# Patient Record
Sex: MALE | Race: WHITE | Employment: UNEMPLOYED | URBAN - METROPOLITAN AREA
[De-identification: names, ages, dates, MRNs, and addresses within clinical notes are randomized per-mention and may not be internally consistent; named-entity substitution may affect disease eponyms.]

---

## 2017-07-12 ENCOUNTER — HOSPITAL ENCOUNTER (OUTPATIENT)
Dept: GENERAL RADIOLOGY | Age: 32
Discharge: HOME | End: 2017-07-12
Payer: COMMERCIAL

## 2017-07-12 ENCOUNTER — HOSPITAL ENCOUNTER (OUTPATIENT)
Dept: PULMONOLOGY | Age: 32
Discharge: HOME | End: 2017-07-12
Payer: COMMERCIAL

## 2017-07-12 DIAGNOSIS — R09.02 HYPOXEMIA: ICD-10-CM

## 2017-07-12 PROCEDURE — 71020 XR CHEST STANDARD TWO VW: CPT

## 2017-07-12 PROCEDURE — 94726 PLETHYSMOGRAPHY LUNG VOLUMES: CPT

## 2017-07-12 PROCEDURE — 94729 DIFFUSING CAPACITY: CPT

## 2017-07-12 PROCEDURE — 94010 BREATHING CAPACITY TEST: CPT

## 2018-10-08 PROBLEM — J44.9 COPD (CHRONIC OBSTRUCTIVE PULMONARY DISEASE) (HCC): Status: ACTIVE | Noted: 2017-07-06

## 2018-10-08 RX ORDER — DEXTROAMPHETAMINE SACCHARATE, AMPHETAMINE ASPARTATE, DEXTROAMPHETAMINE SULFATE AND AMPHETAMINE SULFATE 5; 5; 5; 5 MG/1; MG/1; MG/1; MG/1
40 TABLET ORAL
COMMUNITY
End: 2018-10-17 | Stop reason: ALTCHOICE

## 2018-10-09 ENCOUNTER — TELEPHONE (OUTPATIENT)
Dept: INFECTIOUS DISEASES | Age: 33
End: 2018-10-09

## 2018-10-09 NOTE — TELEPHONE ENCOUNTER
Pt called into clinic stating he was diagnosised with hiv in sept. And looking for treatment. Testing completed by PCP Dr Miguelina Sesay. Will request records when release signed. His mother is his emergency contact as of right now. He \"knew something was wrong\" . His partner is hiv + . They have been together for 15 years now. Usually they use protection but a few times they did not. States he was seen by pcp and treated for thrush. Currently just changed jobs and this job does not offer insurance. Requested he apply to [facility] asap print nancy and bring it with him to intake. Requested pt bring in 1 month of pay stubs to determine RW eligibility. RW eligibility discussed. Complaints right now are fatigue and weight loss. Per t he has lost 20 lbs. Due to pt work schedule 1st available is 10/17/18 at 12pm for labs and intake. Pt was at work therefore he did not want to talk to much more about anything. Will discuss further at intake. Requested he call before apt if needing to talk or have questions or concerns at all.

## 2018-10-16 ENCOUNTER — TELEPHONE (OUTPATIENT)
Dept: INFECTIOUS DISEASES | Age: 33
End: 2018-10-16

## 2018-10-16 DIAGNOSIS — Z21 POSITIVE LABORATORY TESTING FOR HUMAN IMMUNODEFICIENCY VIRUS (HCC): Primary | ICD-10-CM

## 2018-10-16 NOTE — TELEPHONE ENCOUNTER
Brief VM left for pt regarding upcoming apt. Reminder of time and address. Also requested ins. ID and income.

## 2018-10-17 ENCOUNTER — NURSE ONLY (OUTPATIENT)
Dept: INFECTIOUS DISEASES | Age: 33
End: 2018-10-17

## 2018-10-17 DIAGNOSIS — Z21 POSITIVE LABORATORY TESTING FOR HUMAN IMMUNODEFICIENCY VIRUS (HCC): ICD-10-CM

## 2018-10-17 DIAGNOSIS — R91.1 NODULE OF UPPER LOBE OF RIGHT LUNG: ICD-10-CM

## 2018-10-17 DIAGNOSIS — J45.909 UNCOMPLICATED ASTHMA, UNSPECIFIED ASTHMA SEVERITY, UNSPECIFIED WHETHER PERSISTENT: ICD-10-CM

## 2018-10-17 DIAGNOSIS — F41.9 ANXIETY: ICD-10-CM

## 2018-10-17 DIAGNOSIS — D64.9 ANEMIA, UNSPECIFIED TYPE: ICD-10-CM

## 2018-10-17 DIAGNOSIS — E55.9 VITAMIN D DEFICIENCY: ICD-10-CM

## 2018-10-17 LAB
ALBUMIN SERPL-MCNC: 4.3 G/DL (ref 3.9–4.9)
ALP BLD-CCNC: 50 U/L (ref 35–104)
ALT SERPL-CCNC: 47 U/L (ref 0–41)
ANION GAP SERPL CALCULATED.3IONS-SCNC: 17 MEQ/L (ref 7–13)
AST SERPL-CCNC: 34 U/L (ref 0–40)
BILIRUB SERPL-MCNC: 0.5 MG/DL (ref 0–1.2)
BUN BLDV-MCNC: 19 MG/DL (ref 6–20)
CALCIUM SERPL-MCNC: 9.4 MG/DL (ref 8.6–10.2)
CHLORIDE BLD-SCNC: 100 MEQ/L (ref 98–107)
CO2: 23 MEQ/L (ref 22–29)
CREAT SERPL-MCNC: 0.82 MG/DL (ref 0.7–1.2)
GFR AFRICAN AMERICAN: >60
GFR NON-AFRICAN AMERICAN: >60
GLOBULIN: 2.9 G/DL (ref 2.3–3.5)
GLUCOSE BLD-MCNC: 91 MG/DL (ref 74–109)
HBV SURFACE AB TITR SER: REACTIVE MIU/ML
HCT VFR BLD CALC: 37.7 % (ref 42–52)
HEMOGLOBIN: 13 G/DL (ref 14–18)
HEPATITIS B CORE IGM ANTIBODY: NORMAL
HEPATITIS B SURFACE ANTIGEN INTERPRETATION: NORMAL
HEPATITIS C ANTIBODY INTERPRETATION: NORMAL
MCH RBC QN AUTO: 31.1 PG (ref 27–31.3)
MCHC RBC AUTO-ENTMCNC: 34.4 % (ref 33–37)
MCV RBC AUTO: 90.6 FL (ref 80–100)
PDW BLD-RTO: 13.7 % (ref 11.5–14.5)
PLATELET # BLD: 113 K/UL (ref 130–400)
PLATELET SLIDE REVIEW: ABNORMAL
POTASSIUM SERPL-SCNC: 4.4 MEQ/L (ref 3.5–5.1)
RBC # BLD: 4.16 M/UL (ref 4.7–6.1)
SODIUM BLD-SCNC: 140 MEQ/L (ref 132–144)
TOTAL PROTEIN: 7.2 G/DL (ref 6.4–8.1)
WBC # BLD: 2.6 K/UL (ref 4.8–10.8)

## 2018-10-17 NOTE — PROGRESS NOTES
RN Intake      Patient ID:   Tre Keith  Date:   10/17/2018      Client ID:    [id number] [id number] [phone] (home)   [x] OK to leave voicemail   pt cell number use as  primary- [phone]  Family/House:    [x] Partner  [] Children  [] Other -   Partner of 15 years. Partner is hiv positive  Housing:   home  Lives with grandma. Stays in [place] with partner mostly. Transport:  car    Employment:  Works at [employer] [place] [address] to anxiety and depression but denies any need for referral at this time. Substance Abuse:     Social History     Social History    Marital status: Single     Spouse name: N/A    Number of children: N/A    Years of education: N/A     Occupational History    Not on file. Social History Main Topics    Smoking status: Current Every Day Smoker     Packs/day: 0.50     Types: Cigarettes    Smokeless tobacco: Not on file      Comment: cut down to 4 cig. /day    Alcohol use No    Drug use: No    Sexual activity: Not on file     Other Topics Concern    Not on file     Social History Narrative    No narrative on file   NO etoh  NO drugs  Yes smoking 1/2 ppd- not ready to quit  Male partners. Yes sexually active- on off protection- protection reinforced     Diagnosis:   Date of diagnosis:  Sept 2018   Transmition:  heterosexual contact   Signs and symptoms:   [] Diarrhea  [x] Night sweats   [x] Dizziness  [x] Chronic fatigue   [] Vomiting  [x] Nausea   [] Hand/feet pain [] Headaches   [] Swollen lymph [] Skin rash   [] Fevers  [] Body aches   [x] Weight loss  [] Other -   Lost 20 lbs over 8 months not trying  Opportunistic Infections:     [x] Thrush - recently treated for this end of sept. [] Wasting   [] KS   [] PCP   [] CMV   [] STD - labs are to be done to [facility]Healthcare:     PCP Visit:  Maribel eaton.  See scanned in office note and most recent labs   ID Visit:  Will be Dr Audra Morse Visit:  July 2018   Last lab work:  Scanned in from [facility] of [address] [place] [address] Road:   No results found for: LABABSO    Viral Load:  No results found for: ZBH9MOMLAJC, ZKM3DTPHIG   Scanned. Labs per :   Confirmation western blot 8/28/18  [address]  [address] [id number]      Medical Insurance:  Payor: JUSTO Rubio [id number] / Plan: [id number] / Product Type: *No Product type* /    OHDAP/HIPSCA:     Renewal Date:    Pending medicaid- will use RW as last resort. Pt will get new dx labs today or tomorrow. Allergies:  is allergic to toradol [ketorolac tromethamine]; codeine; hydrocodone-acetaminophen; morphine and related; pcn [penicillins]; tramadol; and ketorolac. Reviewed and confirmed with pt  Current Medications:  Current Outpatient Prescriptions on File Prior to Visit   Medication Sig Dispense Refill    albuterol (PROVENTIL) (2.5 MG/3ML) 0.083% nebulizer solution use 1 (ONE) vial (3 (THREE) mls) via NEBULIZER EVERY FOUR HOURS AS NEEDED for FOR WHEEZING  0    VENTOLIN  (90 BASE) MCG/ACT inhaler inhale 1-2 puffs BY MOUTH EVERY FOUR HOURS AS NEEDED for FOR WHEEZING  0    SUMAtriptan (IMITREX) 100 MG tablet       lisdexamfetamine (VYVANSE) 70 MG capsule Take 70 mg by mouth daily       No current facility-administered medications on file prior to visit. reviewed and updated    History of HIV Medications:  NA    Pharmacy:    No Pharmacies Listed  Patient prefers [address]. 2nd choice: [facility]. Legal Issues:    denies    Emergency Contact:   Extended Emergency Contact Information  Primary Emergency Contact: Juan Carlos Hensley  Home Phone: [phone]  Relation: Other  Secondary Emergency Contact: Dc Spence 8 Phone: [phone]  Relation: Other  Also added mother Ahsan Wheeler [phone]  Support System:  Partner and family. Still adjusting to new dx. RN Comments:  Partner of 15 years. Mishap with relationship pt cheated on partner for 2 years un until he was \"sick\" this person is on truvada per pt.

## 2018-10-17 NOTE — PROGRESS NOTES
Client Psychosocial Assessment      Patient ID:   Jose Burciaga  Date:   10/17/18    Client ID:  [id number]  SSN:   [unfilled]  :  1985  :  Andra Huston    [address] [place] [address]  [phone] (home)   [x] OK to leave voicemail    Gender:  male Race:  [address] Str of Residency:  1D  Consortia of Agency:  1D    Referral Source:  Physician/Medical Provider    Family and Professional Contacts:    Extended Emergency Contact Information  Primary Emergency Contact: Juan Carlos Hensley  Home Phone: [phone]  Relation: Other  Secondary Emergency Contact: Malindaalidagustavo Spence  Phone: [phone]  Relation: Other     Samantha Manning MD  [address]     Name: Aware of DX: Supportive: Contact: Address/Phone:   Spouse/Partner:     Luis Edmonds Yes Yes Yes  [phone]   Parents:     Bennie Reddy      Yes Yes Yes [phone]   Siblings:      No No No    Children:      No No No    Others:      No No No    Professional Contacts:     Wendy Mena MD Yes Yes Yes             Medical Insurance:    Payor: Gideon Diaz / Plan: Bernita Dakin / Product Type: *No Product type* /     [facility] outpatient clinic/department    Applied for HIV Rx Program:   OHDAP:  No   Patient Assistance:  No    Diagnosis:    Self-Reported Diagnosis:     HIV:  Yes - Date:    AIDS:  Yes - Date:     Current Physician:     Samantha Manning MD     [address] [facility]     Phone:  [phone]     Dr. Jason Menendez on file: Yes Hospital Affiliation:     [address] [place] [address]     Phone: [phone] [facility] Worker:          Phone:      HIV + Asymptomatic:  No  HIV + Symptomatic:  Yes - Date:     CD4:   No results found for: LABABSO    Viral Load:  No results found for: OSP4COAKGHP, YES4ZZHBHF    HIV Exposure Checklist:    [x] Men who have sex with men (MSM)  [] Injection drug use (IDU)  [] Women who have sex with women

## 2018-10-18 LAB — RPR: NORMAL

## 2018-10-19 LAB
ABSOLUTE CD 4 HELPER: 46 CELLS/UL (ref 430–1800)
CD4 % HELPER T CELL: 8 % (ref 32–64)
HAV AB SERPL IA-ACNC: NEGATIVE
LYMPHOCYTE SUBSET PANEL 2 INFO: ABNORMAL
TOXOPLASMA GONDII AB IGG: <3 IU/ML

## 2018-10-20 LAB
HIV RNA PCR INTERPRETATION: DETECTED
HIV-1 AND HIV-2 ANTIBODIES: NORMAL
HIV-1 RNA BY PCR, QN: 5.2 LOG
HIV-1 RNA BY PCR, QN: ABNORMAL CPY/ML

## 2018-10-21 LAB — HIV-1 WESTERN BLOT: POSITIVE

## 2018-10-22 LAB
QUANTI TB GOLD PLUS: NEGATIVE
QUANTI TB1 MINUS NIL: 0 IU/ML (ref 0–0.34)
QUANTI TB2 MINUS NIL: 0 IU/ML (ref 0–0.34)
QUANTIFERON MITOGEN: 9.95 IU/ML
QUANTIFERON NIL: 0.03 IU/ML

## 2018-10-23 LAB
C. TRACHOMATIS DNA ,URINE: NEGATIVE
HLA-B*5701 GENOTYPING: NEGATIVE
HLA-B*5701 SPECIMEN: NORMAL
N. GONORRHOEAE DNA, URINE: NEGATIVE

## 2018-10-24 LAB
G6PD ALLELE 1: NEGATIVE
G6PD ALLELE 2: NORMAL
G6PD MUTATION INTERPRETATION: NORMAL
G6PD SPECIMEN: NORMAL

## 2018-10-25 LAB
EER HIV GENOTYPING: NORMAL
HIV-1 GENOTYPE: NORMAL

## 2018-10-26 ENCOUNTER — HOSPITAL ENCOUNTER (INPATIENT)
Age: 33
LOS: 4 days | Discharge: HOME | DRG: 974 | End: 2018-10-30
Attending: INTERNAL MEDICINE | Admitting: INTERNAL MEDICINE
Payer: COMMERCIAL

## 2018-10-26 ENCOUNTER — OFFICE VISIT (OUTPATIENT)
Dept: INFECTIOUS DISEASES | Age: 33
End: 2018-10-26
Payer: COMMERCIAL

## 2018-10-26 ENCOUNTER — APPOINTMENT (OUTPATIENT)
Dept: GENERAL RADIOLOGY | Age: 33
DRG: 974 | End: 2018-10-26
Attending: INTERNAL MEDICINE
Payer: COMMERCIAL

## 2018-10-26 ENCOUNTER — NURSE ONLY (OUTPATIENT)
Dept: INFECTIOUS DISEASES | Age: 33
End: 2018-10-26

## 2018-10-26 VITALS
RESPIRATION RATE: 18 BRPM | WEIGHT: 145.8 LBS | HEIGHT: 73 IN | TEMPERATURE: 99.2 F | DIASTOLIC BLOOD PRESSURE: 85 MMHG | BODY MASS INDEX: 19.32 KG/M2 | HEART RATE: 109 BPM | SYSTOLIC BLOOD PRESSURE: 119 MMHG

## 2018-10-26 DIAGNOSIS — R13.10 ODYNOPHAGIA: ICD-10-CM

## 2018-10-26 DIAGNOSIS — J06.9 VIRAL UPPER RESPIRATORY TRACT INFECTION: ICD-10-CM

## 2018-10-26 DIAGNOSIS — R91.8 LUNG NODULES: ICD-10-CM

## 2018-10-26 DIAGNOSIS — E87.5 HYPERKALEMIA: ICD-10-CM

## 2018-10-26 DIAGNOSIS — B37.81 ESOPHAGEAL THRUSH (HCC): ICD-10-CM

## 2018-10-26 DIAGNOSIS — Z21 NEWLY DIAGNOSED HIV-POSITIVE (HCC): ICD-10-CM

## 2018-10-26 DIAGNOSIS — J93.83 RECURRENT SPONTANEOUS PNEUMOTHORAX: ICD-10-CM

## 2018-10-26 DIAGNOSIS — J18.9 COMMUNITY ACQUIRED PNEUMONIA, UNSPECIFIED LATERALITY: ICD-10-CM

## 2018-10-26 DIAGNOSIS — M47.816 SPONDYLOSIS OF LUMBAR REGION WITHOUT MYELOPATHY OR RADICULOPATHY: ICD-10-CM

## 2018-10-26 DIAGNOSIS — F17.200 SMOKER: ICD-10-CM

## 2018-10-26 DIAGNOSIS — B20 HIV (HUMAN IMMUNODEFICIENCY VIRUS INFECTION) (HCC): Primary | ICD-10-CM

## 2018-10-26 DIAGNOSIS — M47.812 CERVICAL SPONDYLOSIS WITHOUT MYELOPATHY: Primary | ICD-10-CM

## 2018-10-26 LAB — LACTIC ACID: 0.7 MMOL/L (ref 0.5–2.2)

## 2018-10-26 PROCEDURE — 2580000003 HC RX 258: Performed by: INTERNAL MEDICINE

## 2018-10-26 PROCEDURE — 2500000003 HC RX 250 WO HCPCS: Performed by: INTERNAL MEDICINE

## 2018-10-26 PROCEDURE — 94664 DEMO&/EVAL PT USE INHALER: CPT

## 2018-10-26 PROCEDURE — 71046 X-RAY EXAM CHEST 2 VIEWS: CPT

## 2018-10-26 PROCEDURE — 1210000000 HC MED SURG R&B

## 2018-10-26 PROCEDURE — 6370000000 HC RX 637 (ALT 250 FOR IP): Performed by: INTERNAL MEDICINE

## 2018-10-26 PROCEDURE — 36415 COLL VENOUS BLD VENIPUNCTURE: CPT

## 2018-10-26 PROCEDURE — 83605 ASSAY OF LACTIC ACID: CPT

## 2018-10-26 PROCEDURE — 99254 IP/OBS CNSLTJ NEW/EST MOD 60: CPT | Performed by: INTERNAL MEDICINE

## 2018-10-26 PROCEDURE — 6360000002 HC RX W HCPCS: Performed by: INTERNAL MEDICINE

## 2018-10-26 PROCEDURE — 99205 OFFICE O/P NEW HI 60 MIN: CPT | Performed by: INTERNAL MEDICINE

## 2018-10-26 RX ORDER — FAMOTIDINE 20 MG/1
20 TABLET, FILM COATED ORAL 2 TIMES DAILY
Status: DISCONTINUED | OUTPATIENT
Start: 2018-10-26 | End: 2018-10-28

## 2018-10-26 RX ORDER — ONDANSETRON 2 MG/ML
4 INJECTION INTRAMUSCULAR; INTRAVENOUS EVERY 6 HOURS PRN
Status: DISCONTINUED | OUTPATIENT
Start: 2018-10-26 | End: 2018-10-30 | Stop reason: HOSPADM

## 2018-10-26 RX ORDER — SULFAMETHOXAZOLE AND TRIMETHOPRIM 800; 160 MG/1; MG/1
1 TABLET ORAL
Status: DISCONTINUED | OUTPATIENT
Start: 2018-10-26 | End: 2018-10-30 | Stop reason: HOSPADM

## 2018-10-26 RX ORDER — IPRATROPIUM BROMIDE AND ALBUTEROL SULFATE 2.5; .5 MG/3ML; MG/3ML
1 SOLUTION RESPIRATORY (INHALATION) EVERY 4 HOURS PRN
Status: DISCONTINUED | OUTPATIENT
Start: 2018-10-26 | End: 2018-10-27

## 2018-10-26 RX ORDER — DEXTROSE, SODIUM CHLORIDE, AND POTASSIUM CHLORIDE 5; .45; .15 G/100ML; G/100ML; G/100ML
INJECTION INTRAVENOUS CONTINUOUS
Status: DISCONTINUED | OUTPATIENT
Start: 2018-10-26 | End: 2018-10-27

## 2018-10-26 RX ORDER — AZITHROMYCIN 250 MG/1
250 TABLET, FILM COATED ORAL DAILY
Status: DISCONTINUED | OUTPATIENT
Start: 2018-10-27 | End: 2018-10-26

## 2018-10-26 RX ORDER — IPRATROPIUM BROMIDE AND ALBUTEROL SULFATE 2.5; .5 MG/3ML; MG/3ML
1 SOLUTION RESPIRATORY (INHALATION)
Status: DISCONTINUED | OUTPATIENT
Start: 2018-10-26 | End: 2018-10-26

## 2018-10-26 RX ORDER — SODIUM CHLORIDE 0.9 % (FLUSH) 0.9 %
10 SYRINGE (ML) INJECTION EVERY 12 HOURS SCHEDULED
Status: DISCONTINUED | OUTPATIENT
Start: 2018-10-26 | End: 2018-10-30 | Stop reason: HOSPADM

## 2018-10-26 RX ORDER — ACETAMINOPHEN 325 MG/1
650 TABLET ORAL EVERY 4 HOURS PRN
Status: DISCONTINUED | OUTPATIENT
Start: 2018-10-26 | End: 2018-10-30 | Stop reason: HOSPADM

## 2018-10-26 RX ORDER — DOCUSATE SODIUM 100 MG/1
100 CAPSULE, LIQUID FILLED ORAL 2 TIMES DAILY
Status: DISCONTINUED | OUTPATIENT
Start: 2018-10-26 | End: 2018-10-28

## 2018-10-26 RX ORDER — EMTRICITABINE AND TENOFOVIR DISOPROXIL FUMARATE 200; 300 MG/1; MG/1
1 TABLET, FILM COATED ORAL DAILY
Status: DISCONTINUED | OUTPATIENT
Start: 2018-10-27 | End: 2018-10-30 | Stop reason: HOSPADM

## 2018-10-26 RX ORDER — SODIUM CHLORIDE 0.9 % (FLUSH) 0.9 %
10 SYRINGE (ML) INJECTION PRN
Status: DISCONTINUED | OUTPATIENT
Start: 2018-10-26 | End: 2018-10-30 | Stop reason: HOSPADM

## 2018-10-26 RX ORDER — FLUCONAZOLE 2 MG/ML
400 INJECTION, SOLUTION INTRAVENOUS EVERY 24 HOURS
Status: DISCONTINUED | OUTPATIENT
Start: 2018-10-27 | End: 2018-10-30 | Stop reason: HOSPADM

## 2018-10-26 RX ADMIN — Medication 10 ML: at 22:02

## 2018-10-26 RX ADMIN — SULFAMETHOXAZOLE AND TRIMETHOPRIM 1 TABLET: 800; 160 TABLET ORAL at 23:45

## 2018-10-26 RX ADMIN — CEFTRIAXONE SODIUM 1 G: 1 INJECTION, POWDER, FOR SOLUTION INTRAMUSCULAR; INTRAVENOUS at 23:46

## 2018-10-26 RX ADMIN — DOCUSATE SODIUM 100 MG: 100 CAPSULE, LIQUID FILLED ORAL at 22:01

## 2018-10-26 RX ADMIN — NYSTATIN 500000 UNITS: 500000 SUSPENSION ORAL at 23:04

## 2018-10-26 RX ADMIN — FAMOTIDINE 20 MG: 20 TABLET ORAL at 22:01

## 2018-10-26 RX ADMIN — POTASSIUM CHLORIDE, DEXTROSE MONOHYDRATE AND SODIUM CHLORIDE: 150; 5; 450 INJECTION, SOLUTION INTRAVENOUS at 22:01

## 2018-10-26 RX ADMIN — ACETAMINOPHEN 650 MG: 325 TABLET ORAL at 22:01

## 2018-10-26 ASSESSMENT — PAIN SCALES - GENERAL: PAINLEVEL_OUTOF10: 8

## 2018-10-26 ASSESSMENT — PATIENT HEALTH QUESTIONNAIRE - PHQ9
SUM OF ALL RESPONSES TO PHQ QUESTIONS 1-9: 2
SUM OF ALL RESPONSES TO PHQ9 QUESTIONS 1 & 2: 2
SUM OF ALL RESPONSES TO PHQ QUESTIONS 1-9: 2
2. FEELING DOWN, DEPRESSED OR HOPELESS: 1
1. LITTLE INTEREST OR PLEASURE IN DOING THINGS: 1

## 2018-10-26 NOTE — PROGRESS NOTES
Initial HIV Consult      Patient Name: Corey Maciel  YOB: 1985  Patient Sex: male  Medical Record Number: [id number]        Background:  Patient is a newly diagnosed HIV + MSM with a longterm HIV+ partner since 2006. Partner's regimen is unknown. Last prior negative HIV blood test was in 2009, although he says he got a mouth swab in 2016 and was told it was negative. Presented to my office with mario CD4 <50, 8%, viral load is 160,000. G6PD negative, RPR negative, HLA negative, serum quantiferon negative, chlam/gonorrhea negative, Hepatitis negative. Genotype with resistance to stavudine and zidovudine, otherwise negative      Presenting with + thrush, including possible esophageal thrush, overall malaise, URI with SOB. On chart review dating back to 2014, I am seeing an abnormal Echo with \"ischemia\" noted althought he EF noted to be normal at the time. I am also seeing recurrent episodes of pneumothorax and blebs with a RML lung nodule <1cm. He has a hx of VATS as well. There is also a positive rheumatoid factor from 2014 that was never followed. Patient has complaints of pains in his hands and down his spine for years with hx of what seems to be reynauds by description. He also tells me that he has been on the same O2nc at home at night ( albeit noncompliant ) for the past year.      + weight loss >30lbs over 8 months.   + cigarette use, no illicit drugs  No alcohol. Has been in pain management in the past with Dr Nick Lyons due to persistent back pain from neck to lumbar region, which he thought was from sleeping on the couch, but seems to be persistent.      Has had a persistent L dorsal hand skin rash      ~20 partners over the course of his lifetime, all male. 1 female initially. No TB exposure that he knows of. Likes to travel - has been to [place] this past February and [place] in [place] past, [place] as a teenager.      + dogs at home. Works as a manager at [employer].    Has another

## 2018-10-27 ENCOUNTER — APPOINTMENT (OUTPATIENT)
Dept: CT IMAGING | Age: 33
DRG: 974 | End: 2018-10-27
Attending: INTERNAL MEDICINE
Payer: COMMERCIAL

## 2018-10-27 ENCOUNTER — APPOINTMENT (OUTPATIENT)
Dept: GENERAL RADIOLOGY | Age: 33
DRG: 974 | End: 2018-10-27
Attending: INTERNAL MEDICINE
Payer: COMMERCIAL

## 2018-10-27 PROBLEM — E43 SEVERE MALNUTRITION (HCC): Chronic | Status: ACTIVE | Noted: 2018-10-27

## 2018-10-27 PROBLEM — B20 AIDS DUE TO HIV-I (HCC): Chronic | Status: ACTIVE | Noted: 2018-10-27

## 2018-10-27 PROBLEM — J44.9 COPD (CHRONIC OBSTRUCTIVE PULMONARY DISEASE) (HCC): Chronic | Status: ACTIVE | Noted: 2017-07-06

## 2018-10-27 PROBLEM — B20 AIDS (ACQUIRED IMMUNODEFICIENCY SYNDROME), CD4 <=200 (HCC): Chronic | Status: ACTIVE | Noted: 2018-10-27

## 2018-10-27 PROBLEM — J44.0 COPD WITH LOWER RESPIRATORY INFECTION (HCC): Status: ACTIVE | Noted: 2018-10-26

## 2018-10-27 PROBLEM — R91.8 LUNG NODULES: Status: ACTIVE | Noted: 2018-10-17

## 2018-10-27 PROBLEM — E43 SEVERE MALNUTRITION (HCC): Status: ACTIVE | Noted: 2018-10-27

## 2018-10-27 LAB
ALBUMIN SERPL-MCNC: 3.8 G/DL (ref 3.9–4.9)
ALP BLD-CCNC: 44 U/L (ref 35–104)
ALT SERPL-CCNC: 25 U/L (ref 0–41)
ANION GAP SERPL CALCULATED.3IONS-SCNC: 14 MEQ/L (ref 7–13)
AST SERPL-CCNC: 23 U/L (ref 0–40)
ATYPICAL LYMPHOCYTE RELATIVE PERCENT: 2 %
BANDED NEUTROPHILS RELATIVE PERCENT: 5 %
BASOPHILS ABSOLUTE: 0 K/UL (ref 0–0.2)
BASOPHILS RELATIVE PERCENT: 0.4 %
BILIRUB SERPL-MCNC: 0.3 MG/DL (ref 0–1.2)
BUN BLDV-MCNC: 12 MG/DL (ref 6–20)
C-REACTIVE PROTEIN, HIGH SENSITIVITY: 1.4 MG/L (ref 0–5)
CALCIUM SERPL-MCNC: 8.7 MG/DL (ref 8.6–10.2)
CHLORIDE BLD-SCNC: 102 MEQ/L (ref 98–107)
CK MB: 1.5 NG/ML (ref 0–6.7)
CO2: 25 MEQ/L (ref 22–29)
CREAT SERPL-MCNC: 0.94 MG/DL (ref 0.7–1.2)
CREATINE KINASE-MB INDEX: 1.4 % (ref 0–3.5)
EOSINOPHILS ABSOLUTE: 0 K/UL (ref 0–0.7)
EOSINOPHILS RELATIVE PERCENT: 0.9 %
GFR AFRICAN AMERICAN: >60
GFR NON-AFRICAN AMERICAN: >60
GLOBULIN: 2.7 G/DL (ref 2.3–3.5)
GLUCOSE BLD-MCNC: 106 MG/DL (ref 74–109)
HCT VFR BLD CALC: 36.6 % (ref 42–52)
HEMOGLOBIN: 12.6 G/DL (ref 14–18)
LYMPHOCYTES ABSOLUTE: 0.3 K/UL (ref 1–4.8)
LYMPHOCYTES RELATIVE PERCENT: 7 %
MCH RBC QN AUTO: 31.6 PG (ref 27–31.3)
MCHC RBC AUTO-ENTMCNC: 34.5 % (ref 33–37)
MCV RBC AUTO: 91.6 FL (ref 80–100)
MONOCYTES ABSOLUTE: 0.3 K/UL (ref 0.2–0.8)
MONOCYTES RELATIVE PERCENT: 9.6 %
NEUTROPHILS ABSOLUTE: 2.5 K/UL (ref 1.4–6.5)
NEUTROPHILS RELATIVE PERCENT: 77 %
OVALOCYTES: ABNORMAL
PDW BLD-RTO: 14.2 % (ref 11.5–14.5)
PLATELET # BLD: 89 K/UL (ref 130–400)
PLATELET SLIDE REVIEW: ABNORMAL
POIKILOCYTES: ABNORMAL
POTASSIUM SERPL-SCNC: 4.2 MEQ/L (ref 3.5–5.1)
PROCALCITONIN: 0.04 NG/ML (ref 0–0.15)
RBC # BLD: 3.99 M/UL (ref 4.7–6.1)
RHEUMATOID FACTOR: <10 IU/ML (ref 0–14)
SODIUM BLD-SCNC: 141 MEQ/L (ref 132–144)
TOTAL CK: 102 U/L (ref 0–190)
TOTAL CK: 105 U/L (ref 0–190)
TOTAL PROTEIN: 6.5 G/DL (ref 6.4–8.1)
TROPONIN: <0.01 NG/ML (ref 0–0.01)
TROPONIN: <0.01 NG/ML (ref 0–0.01)
WBC # BLD: 3 K/UL (ref 4.8–10.8)

## 2018-10-27 PROCEDURE — 2700000000 HC OXYGEN THERAPY PER DAY

## 2018-10-27 PROCEDURE — 2580000003 HC RX 258: Performed by: INTERNAL MEDICINE

## 2018-10-27 PROCEDURE — 86308 HETEROPHILE ANTIBODY SCREEN: CPT

## 2018-10-27 PROCEDURE — 87899 AGENT NOS ASSAY W/OPTIC: CPT

## 2018-10-27 PROCEDURE — 6370000000 HC RX 637 (ALT 250 FOR IP): Performed by: INTERNAL MEDICINE

## 2018-10-27 PROCEDURE — 86431 RHEUMATOID FACTOR QUANT: CPT

## 2018-10-27 PROCEDURE — G8980 MOBILITY D/C STATUS: HCPCS

## 2018-10-27 PROCEDURE — 36415 COLL VENOUS BLD VENIPUNCTURE: CPT

## 2018-10-27 PROCEDURE — 80053 COMPREHEN METABOLIC PANEL: CPT

## 2018-10-27 PROCEDURE — 82550 ASSAY OF CK (CPK): CPT

## 2018-10-27 PROCEDURE — 85025 COMPLETE CBC W/AUTO DIFF WBC: CPT

## 2018-10-27 PROCEDURE — 84145 PROCALCITONIN (PCT): CPT

## 2018-10-27 PROCEDURE — G8978 MOBILITY CURRENT STATUS: HCPCS

## 2018-10-27 PROCEDURE — 94761 N-INVAS EAR/PLS OXIMETRY MLT: CPT

## 2018-10-27 PROCEDURE — 97161 PT EVAL LOW COMPLEX 20 MIN: CPT

## 2018-10-27 PROCEDURE — 6360000002 HC RX W HCPCS: Performed by: INTERNAL MEDICINE

## 2018-10-27 PROCEDURE — 1210000000 HC MED SURG R&B

## 2018-10-27 PROCEDURE — 2500000003 HC RX 250 WO HCPCS: Performed by: INTERNAL MEDICINE

## 2018-10-27 PROCEDURE — 82553 CREATINE MB FRACTION: CPT

## 2018-10-27 PROCEDURE — 94640 AIRWAY INHALATION TREATMENT: CPT

## 2018-10-27 PROCEDURE — G8979 MOBILITY GOAL STATUS: HCPCS

## 2018-10-27 PROCEDURE — 99254 IP/OBS CNSLTJ NEW/EST MOD 60: CPT | Performed by: INTERNAL MEDICINE

## 2018-10-27 PROCEDURE — 71250 CT THORAX DX C-: CPT

## 2018-10-27 PROCEDURE — 87633 RESP VIRUS 12-25 TARGETS: CPT

## 2018-10-27 PROCEDURE — 86141 C-REACTIVE PROTEIN HS: CPT

## 2018-10-27 PROCEDURE — 86812 HLA TYPING A B OR C: CPT

## 2018-10-27 PROCEDURE — 99222 1ST HOSP IP/OBS MODERATE 55: CPT | Performed by: INTERNAL MEDICINE

## 2018-10-27 PROCEDURE — APPNB30 APP NON BILLABLE TIME 0-30 MINS: Performed by: NURSE PRACTITIONER

## 2018-10-27 PROCEDURE — 84484 ASSAY OF TROPONIN QUANT: CPT

## 2018-10-27 PROCEDURE — 87449 NOS EACH ORGANISM AG IA: CPT

## 2018-10-27 PROCEDURE — 72100 X-RAY EXAM L-S SPINE 2/3 VWS: CPT

## 2018-10-27 RX ORDER — FOLIC ACID 1 MG/1
1 TABLET ORAL DAILY
Status: DISCONTINUED | OUTPATIENT
Start: 2018-10-27 | End: 2018-10-30 | Stop reason: HOSPADM

## 2018-10-27 RX ORDER — IPRATROPIUM BROMIDE AND ALBUTEROL SULFATE 2.5; .5 MG/3ML; MG/3ML
1 SOLUTION RESPIRATORY (INHALATION)
Status: DISCONTINUED | OUTPATIENT
Start: 2018-10-27 | End: 2018-10-30

## 2018-10-27 RX ORDER — THIAMINE HYDROCHLORIDE 100 MG/ML
500 INJECTION, SOLUTION INTRAMUSCULAR; INTRAVENOUS ONCE
Status: COMPLETED | OUTPATIENT
Start: 2018-10-27 | End: 2018-10-27

## 2018-10-27 RX ORDER — LORAZEPAM 2 MG/ML
1 INJECTION INTRAMUSCULAR EVERY 6 HOURS PRN
Status: DISCONTINUED | OUTPATIENT
Start: 2018-10-27 | End: 2018-10-29

## 2018-10-27 RX ORDER — OXYCODONE HYDROCHLORIDE AND ACETAMINOPHEN 5; 325 MG/1; MG/1
1 TABLET ORAL EVERY 4 HOURS PRN
Status: DISCONTINUED | OUTPATIENT
Start: 2018-10-27 | End: 2018-10-30 | Stop reason: HOSPADM

## 2018-10-27 RX ORDER — ZOLPIDEM TARTRATE 5 MG/1
5 TABLET ORAL NIGHTLY PRN
Status: DISCONTINUED | OUTPATIENT
Start: 2018-10-27 | End: 2018-10-30 | Stop reason: HOSPADM

## 2018-10-27 RX ADMIN — CEFTRIAXONE SODIUM 1 G: 1 INJECTION, POWDER, FOR SOLUTION INTRAMUSCULAR; INTRAVENOUS at 23:22

## 2018-10-27 RX ADMIN — FOLIC ACID 1 MG: 1 TABLET ORAL at 15:43

## 2018-10-27 RX ADMIN — DOCUSATE SODIUM 100 MG: 100 CAPSULE, LIQUID FILLED ORAL at 21:48

## 2018-10-27 RX ADMIN — FAMOTIDINE 20 MG: 20 TABLET ORAL at 21:48

## 2018-10-27 RX ADMIN — AZITHROMYCIN MONOHYDRATE 500 MG: 500 INJECTION, POWDER, LYOPHILIZED, FOR SOLUTION INTRAVENOUS at 02:26

## 2018-10-27 RX ADMIN — ONDANSETRON 4 MG: 2 INJECTION INTRAMUSCULAR; INTRAVENOUS at 03:40

## 2018-10-27 RX ADMIN — NYSTATIN 500000 UNITS: 500000 SUSPENSION ORAL at 13:48

## 2018-10-27 RX ADMIN — IPRATROPIUM BROMIDE AND ALBUTEROL SULFATE 1 AMPULE: .5; 3 SOLUTION RESPIRATORY (INHALATION) at 19:40

## 2018-10-27 RX ADMIN — EMTRICITABINE AND TENOFOVIR DISOPROXIL FUMARATE 1 TABLET: 200; 300 TABLET, FILM COATED ORAL at 08:49

## 2018-10-27 RX ADMIN — IPRATROPIUM BROMIDE AND ALBUTEROL SULFATE 1 AMPULE: .5; 3 SOLUTION RESPIRATORY (INHALATION) at 15:23

## 2018-10-27 RX ADMIN — NYSTATIN 500000 UNITS: 500000 SUSPENSION ORAL at 21:47

## 2018-10-27 RX ADMIN — POTASSIUM CHLORIDE, DEXTROSE MONOHYDRATE AND SODIUM CHLORIDE: 150; 5; 450 INJECTION, SOLUTION INTRAVENOUS at 13:45

## 2018-10-27 RX ADMIN — ZOLPIDEM TARTRATE 5 MG: 5 TABLET ORAL at 23:20

## 2018-10-27 RX ADMIN — THIAMINE HYDROCHLORIDE 500 MG: 100 INJECTION, SOLUTION INTRAMUSCULAR; INTRAVENOUS at 15:43

## 2018-10-27 RX ADMIN — FAMOTIDINE 20 MG: 20 TABLET ORAL at 08:49

## 2018-10-27 RX ADMIN — OXYCODONE AND ACETAMINOPHEN 1 TABLET: 5; 325 TABLET ORAL at 15:50

## 2018-10-27 RX ADMIN — NYSTATIN 500000 UNITS: 500000 SUSPENSION ORAL at 17:55

## 2018-10-27 RX ADMIN — DOCUSATE SODIUM 100 MG: 100 CAPSULE, LIQUID FILLED ORAL at 08:49

## 2018-10-27 RX ADMIN — Medication 10 ML: at 21:50

## 2018-10-27 RX ADMIN — NYSTATIN 500000 UNITS: 500000 SUSPENSION ORAL at 08:49

## 2018-10-27 RX ADMIN — DARUNAVIR ETHANOLATE AND COBICISTAT 1 EACH: 800; 150 TABLET, FILM COATED ORAL at 08:49

## 2018-10-27 RX ADMIN — FLUCONAZOLE 400 MG: 400 INJECTION, SOLUTION INTRAVENOUS at 00:21

## 2018-10-27 RX ADMIN — AZITHROMYCIN MONOHYDRATE 500 MG: 500 INJECTION, POWDER, LYOPHILIZED, FOR SOLUTION INTRAVENOUS at 23:49

## 2018-10-27 RX ADMIN — OXYCODONE AND ACETAMINOPHEN 1 TABLET: 5; 325 TABLET ORAL at 21:48

## 2018-10-27 RX ADMIN — ASCORBIC ACID, VITAMIN A PALMITATE, CHOLECALCIFEROL, THIAMINE HYDROCHLORIDE, RIBOFLAVIN-5 PHOSPHATE SODIUM, PYRIDOXINE HYDROCHLORIDE, NIACINAMIDE, DEXPANTHENOL, ALPHA-TOCOPHEROL ACETATE, VITAMIN K1, FOLIC ACID, BIOTIN, CYANOCOBALAMIN: 200; 3300; 200; 6; 3.6; 6; 40; 15; 10; 150; 600; 60; 5 INJECTION, SOLUTION INTRAVENOUS at 16:33

## 2018-10-27 RX ADMIN — Medication 10 ML: at 08:52

## 2018-10-27 ASSESSMENT — ENCOUNTER SYMPTOMS
GASTROINTESTINAL NEGATIVE: 1
COUGH: 0
ABDOMINAL PAIN: 0
EYE DISCHARGE: 0
STRIDOR: 0
ABDOMINAL DISTENTION: 0
COLOR CHANGE: 0
COUGH: 1
APNEA: 0
CHEST TIGHTNESS: 1
TROUBLE SWALLOWING: 0
ALLERGIC/IMMUNOLOGIC NEGATIVE: 1
CHEST TIGHTNESS: 0
NAUSEA: 0
SHORTNESS OF BREATH: 1
WHEEZING: 0
EYES NEGATIVE: 1
CHOKING: 0
EYE PAIN: 0

## 2018-10-27 ASSESSMENT — PAIN SCALES - GENERAL
PAINLEVEL_OUTOF10: 7
PAINLEVEL_OUTOF10: 7
PAINLEVEL_OUTOF10: 0
PAINLEVEL_OUTOF10: 0
PAINLEVEL_OUTOF10: 6

## 2018-10-27 NOTE — PROGRESS NOTES
Admission and assessment completed. Patient is alert and oriented X4. Denies any SOB, N/V, or dizziness at this time. Is complaining of back and throat pain (thrush) 7/10; tylenol was given for his back and diflucan and nystatin for his throat. No other issues or concerns at this time. Will continue to monitor and follow plan of care. Patient resting comfortably in bed with the call light within reach. Family at bedside.   Electronically signed by Hector Galarza RN on 10/26/2018 at 7:23 PM

## 2018-10-27 NOTE — CONSULTS
exhibits no discharge. Neck: Normal range of motion. Neck supple. No JVD present. No tracheal deviation present. Cardiovascular: Normal rate, regular rhythm, normal heart sounds and intact distal pulses. Exam reveals no gallop and no friction rub. No murmur heard. Pulmonary/Chest: Effort normal and breath sounds normal. No respiratory distress. He has no wheezes. He has no rales. Abdominal: Soft. Bowel sounds are normal. He exhibits no distension. There is no tenderness. Musculoskeletal: Normal range of motion. He exhibits no edema. Neurological: He is alert and oriented to person, place, and time. No cranial nerve deficit. Skin: Skin is warm. No rash noted. He is not diaphoretic. Psychiatric: He has a normal mood and affect.        LABS:  CBC:   Lab Results   Component Value Date    WBC 3.0 10/27/2018    RBC 3.99 10/27/2018    HGB 12.6 10/27/2018    HCT 36.6 10/27/2018    MCV 91.6 10/27/2018    MCH 31.6 10/27/2018    MCHC 34.5 10/27/2018    RDW 14.2 10/27/2018    PLT 89 10/27/2018    MPV 7.9 07/21/2014     CBC with Differential:   Lab Results   Component Value Date    WBC 3.0 10/27/2018    RBC 3.99 10/27/2018    HGB 12.6 10/27/2018    HCT 36.6 10/27/2018    PLT 89 10/27/2018    MCV 91.6 10/27/2018    MCH 31.6 10/27/2018    MCHC 34.5 10/27/2018    RDW 14.2 10/27/2018    BANDSPCT 5 10/27/2018    LYMPHOPCT 7.0 10/27/2018    MONOPCT 9.6 10/27/2018    BASOPCT 0.4 10/27/2018    MONOSABS 0.3 10/27/2018    LYMPHSABS 0.3 10/27/2018    EOSABS 0.0 10/27/2018    BASOSABS 0.0 10/27/2018    DIFFTYPE NOT REPORTED 07/21/2014     CMP:    Lab Results   Component Value Date     10/27/2018    K 4.2 10/27/2018     10/27/2018    CO2 25 10/27/2018    BUN 12 10/27/2018    CREATININE 0.94 10/27/2018    GFRAA >60.0 10/27/2018    LABGLOM >60.0 10/27/2018    GLUCOSE 106 10/27/2018    PROT 6.5 10/27/2018    LABALBU 3.8 10/27/2018    CALCIUM 8.7 10/27/2018    BILITOT 0.3 10/27/2018    ALKPHOS 44 10/27/2018    AST

## 2018-10-27 NOTE — CONSULTS
Drug use: No    Sexual activity: Yes     Partners: Male     Other Topics Concern    None     Social History Narrative    None       Family Hx:  Family History   Problem Relation Age of Onset    Hypertension Mother        Review of Systems:   Review of Systems   Constitutional: Positive for fatigue. Negative for appetite change, chills, diaphoresis and fever. HENT: Negative. Eyes: Negative. Respiratory: Positive for cough and shortness of breath. Negative for apnea, choking, chest tightness, wheezing and stridor. Cardiovascular: Positive for chest pain. Negative for palpitations and leg swelling. Gastrointestinal: Negative. Endocrine: Negative. Genitourinary: Negative. Musculoskeletal: Negative. Allergic/Immunologic: Negative. Neurological: Negative. Hematological: Negative. Psychiatric/Behavioral: Negative. Physical Examination:    /73   Pulse 58   Temp 97.9 °F (36.6 °C) (Oral)   Resp 16   Ht 6' (1.829 m)   Wt 142 lb 14.4 oz (64.8 kg)   SpO2 100%   BMI 19.38 kg/m²    Physical Exam   Constitutional: He is oriented to person, place, and time. He appears well-developed and well-nourished. HENT:   Head: Normocephalic. Eyes: Pupils are equal, round, and reactive to light. Neck: No JVD present. No tracheal deviation present. No thyromegaly present. Cardiovascular: Normal rate, regular rhythm, normal heart sounds and intact distal pulses. Exam reveals no gallop and no friction rub. No murmur heard. Pulmonary/Chest: Effort normal and breath sounds normal. No respiratory distress. He has no wheezes. He has no rales. He exhibits tenderness. Abdominal: Soft. Bowel sounds are normal.   Musculoskeletal: Normal range of motion. He exhibits no edema or tenderness. Lymphadenopathy:     He has no cervical adenopathy. Neurological: He is alert and oriented to person, place, and time. Skin: Skin is warm and dry.    Psychiatric: He has a normal mood and

## 2018-10-27 NOTE — CONSULTS
Hany in the past, [place] as a teenager. + dogs at home. Works as a manager at [employer]. Has another partner outside of the relationship - that person is on truvada Rosemary Gabriel)  Current partner - Popeye Hermosillo. No other hx of STDs in the past.      Has been in [facility] a day or two.      Support system: mom and grandma and siblings - only the brother and mother know of his status. In an emergency contact Popeye Hermosillo or his mother. Both have full disclosure. In case Popeye Hermosillo can't be reached then can talk to his mom. Assessment/Plan:  Admitted due to URI in the setting of CD4 <50 in a newly diagnosed HIV patient, esophageal thrush, ischemic heart disease, recurrent pneumothorax history with blebs on home O2, persistent back pain, and positive rheumatoid factor    Started on Prezcobix and Truvada, treatment for CAP, Bactrim prophylaxis, and Diflucan. CT chest ordered. Respiratory viral panel ordered. Autoimmune workup in progress due to positive RF in the past and persistent back pain - added HLA B27. GI, Pulm, and Cardio on consult. Case discussed with primary. Monitor for signs of IRIS in first two weeks on HIV treatment. Review of Systems: All 14 review of systems were discussed with the patient and are negative other than as stated above      Past Medical History:   Diagnosis Date    ADHD     AIDS (acquired immune deficiency syndrome) (HCC)     Anemia     Asthma     Chronic back pain     [facility] obstructive [facility] disease) ([facility]     Ischemic heart disease     Lung disease     Pneumothorax July 17, 2014    right side    Smoker     Vitamin D deficiency        Past Surgical History:   Procedure Laterality Date    THORACOSCOPY  7/17/14    Right VATS with stapling of apical blebs and parietal pleurectomy       Social History     Social History    Marital status: Single     Spouse name: N/A    Number of children: N/A    Years of education: N/A     Occupational History    Not on file.      Social

## 2018-10-27 NOTE — PLAN OF CARE
Problem: Infection:  Goal: Will remain free from infection  Will remain free from infection  Outcome: Ongoing      Problem: Safety:  Goal: Free from accidental physical injury  Free from accidental physical injury  Outcome: Ongoing      Problem: Daily Care:  Goal: Daily care needs are met  Daily care needs are met  Outcome: Ongoing      Problem: Pain:  Goal: Patient's pain/discomfort is manageable  Patient's pain/discomfort is manageable  Outcome: Ongoing

## 2018-10-27 NOTE — CARE COORDINATION
MET WITH PATIENT AT BEDSIDE. HE STATED HE DOES NOT HAVE INSURANCE AT THIS TIME, HE IS WAITING FOR HIS MEDICAID TO BE APPROVED OR DENIED. NO D/C NEEDS WERE IDENTIFIED AT THIS TIME. HE FOLLOWS WITH THE HIV CLINIC/DR. [facility] OFFICE.   Electronically signed by Cheryl Monge RN on 10/27/2018 at 3:40 PM

## 2018-10-27 NOTE — CONSULTS
106       MV Settings: ABGs: No results for input(s): PHART, VIP1THH, PO2ART, SSG7ZWD, BEART, F8YXNLXV, DJP3DAV in the last 72 hours. O2 Device: Nasal cannula  O2 Flow Rate (L/min): 2 L/min  Lab Results   Component Value Date    LACTA 0.7 10/26/2018       Radiology  Xr Chest Standard (2 Vw)    Result Date: 10/27/2018  XR CHEST (2 VW): 10/26/2018 CLINICAL HISTORY: Chest pain and shortness of breath. COMPARISON: 7/12/2017. Upright PA and lateral radiographs of the chest were obtained. FINDINGS: Mild hyperinflation unchanged, with mild scarring and postoperative changes of the medial right lung apex, and a 5 mm granuloma in the mid to upper lung field. . There are no developing infiltrates, pleural effusion, cardiomegaly, vascular congestion, pneumothorax, or displaced fractures identified. NO EVIDENCE OF ACTIVE CARDIOPULMONARY DISEASE. Xr Lumbar Spine (2-3 Views)    Result Date: 10/27/2018  XR LUMBAR SPINE (2-3 VIEWS) : 10/27/2018 CLINICAL HISTORY:  back pain acute on chronic . COMPARISON: None available. TECHNIQUE: AP, lateral, and coned-down lateral radiographs of the lumbar spine were obtained. FINDINGS: There is no compression, fracture, subluxation, radiodense foreign bodies, worrisome bone destruction, or pathologic calcifications identified. The sacroiliac joints are intact. NEGATIVE LUMBAR SPINE. Ct Chest Wo Contrast    Result Date: 10/27/2018  CT CHEST WO CONTRAST : 10/26/2018 CLINICAL HISTORY: hx of lung nodule, recurrent pneumothorax and current URI with CD4<50 in newly dx HIV . COMPARISON: Chest CTA 5/24/2008. TECHNIQUE: Spiral unenhanced images were obtained of the chest without contrast. Routine reconstructions were performed with attention to pulmonary nodules. All CT scans At this facility use dose modulation, iterative reconstruction, and/or weight based dosing when appropriate to reduce radiation dose to as low as reasonably achievable.  FINDINGS: An approximately 6 mm in

## 2018-10-27 NOTE — PROGRESS NOTES
Nutrition Assessment    Type and Reason for Visit: Initial, Positive Nutrition Screen, Consult    Nutrition Recommendations: Continue current diet, Start ONS (Add high calorie supplement tid and frozen supplement bid)    Malnutrition Assessment:  · Malnutrition Status: Meets the criteria for severe malnutrition  · Context: Chronic illness   · Findings of the 6 clinical characteristics of malnutrition (Minimum of 2 out of 6 clinical characteristics is required to make the diagnosis of moderate or severe Protein Calorie Malnutrition based on AND/ASPEN Guidelines):  1. Energy Intake-Less than or equal to 50%, greater than or equal to 1 month    2. Weight Loss-10% loss or greater,  (in ~  months)  3. Fat Loss-Mild subcutaneous fat loss, Orbital  4. Muscle Loss-Moderate muscle mass loss, Temples (temporalis muscle), Clavicles (pectoralis and deltoids)  5. Fluid Accumulation-No significant fluid accumulation,    6.  Strength-Not measured    Nutrition Diagnosis:   · Problem: Inadequate oral intake  · Etiology: related to Catabolic illness, Difficulty swallowing     Signs and symptoms:  as evidenced by Weight loss, Moderate muscle loss, Patient report of (decreased appetite/intake)    Nutrition Assessment:  · Subjective Assessment: Pt c/o decreased appetite/intake with oral thrush over last ~1 month, with weight loss over last ~8-10 months. Pt agreeable to try supplements. · Nutrition-Focused Physical Findings: No edema noted.    · Wound Type: None  · Current Nutrition Therapies:  · Oral Diet Orders: General   · Oral Diet intake: 51-75%  · Oral Nutrition Supplement (ONS) Orders: None  · Anthropometric Measures:  · Ht: 6' (182.9 cm)   · Admission Body Wt: 142 lb (64.4 kg)  · Usual Body Wt: 160 lb (72.6 kg) (8-10 months ago per pt; 160lb documented on 10/27/17)  · % Weight Change: 11%,   (in ~ 8 months, stated)  · Ideal Body Wt: 178 lb (80.7 kg), % Ideal Body 80%  · BMI Classification: BMI 18.5 - 24.9 Normal

## 2018-10-28 ENCOUNTER — APPOINTMENT (OUTPATIENT)
Dept: CT IMAGING | Age: 33
DRG: 974 | End: 2018-10-28
Attending: INTERNAL MEDICINE
Payer: COMMERCIAL

## 2018-10-28 LAB
ALBUMIN SERPL-MCNC: 4.1 G/DL (ref 3.9–4.9)
ALP BLD-CCNC: 46 U/L (ref 35–104)
ALT SERPL-CCNC: 32 U/L (ref 0–41)
ANION GAP SERPL CALCULATED.3IONS-SCNC: 8 MEQ/L (ref 7–13)
AST SERPL-CCNC: 27 U/L (ref 0–40)
BASOPHILS ABSOLUTE: 0 K/UL (ref 0–0.2)
BASOPHILS RELATIVE PERCENT: 1 %
BILIRUB SERPL-MCNC: 0.3 MG/DL (ref 0–1.2)
BUN BLDV-MCNC: 18 MG/DL (ref 6–20)
CALCIUM SERPL-MCNC: 9.1 MG/DL (ref 8.6–10.2)
CHLORIDE BLD-SCNC: 100 MEQ/L (ref 98–107)
CK MB: 1.5 NG/ML (ref 0–6.7)
CO2: 26 MEQ/L (ref 22–29)
CREAT SERPL-MCNC: 0.93 MG/DL (ref 0.7–1.2)
CREATINE KINASE-MB INDEX: 1.4 % (ref 0–3.5)
EOSINOPHILS ABSOLUTE: 0 K/UL (ref 0–0.7)
EOSINOPHILS RELATIVE PERCENT: 0.3 %
GFR AFRICAN AMERICAN: >60
GFR NON-AFRICAN AMERICAN: >60
GLOBULIN: 3.4 G/DL (ref 2.3–3.5)
GLUCOSE BLD-MCNC: 65 MG/DL (ref 74–109)
HCT VFR BLD CALC: 40 % (ref 42–52)
HEMOGLOBIN: 13.7 G/DL (ref 14–18)
HLA B27: NEGATIVE
LYMPHOCYTES ABSOLUTE: 0.4 K/UL (ref 1–4.8)
LYMPHOCYTES RELATIVE PERCENT: 12.8 %
MAGNESIUM: 2.4 MG/DL (ref 1.7–2.3)
MCH RBC QN AUTO: 31.4 PG (ref 27–31.3)
MCHC RBC AUTO-ENTMCNC: 34.2 % (ref 33–37)
MCV RBC AUTO: 91.8 FL (ref 80–100)
MONOCYTES ABSOLUTE: 0.4 K/UL (ref 0.2–0.8)
MONOCYTES RELATIVE PERCENT: 13.5 %
NEUTROPHILS ABSOLUTE: 2 K/UL (ref 1.4–6.5)
NEUTROPHILS RELATIVE PERCENT: 72.4 %
PDW BLD-RTO: 14.1 % (ref 11.5–14.5)
PHOSPHORUS: 3.4 MG/DL (ref 2.5–4.5)
PLATELET # BLD: 94 K/UL (ref 130–400)
POTASSIUM SERPL-SCNC: 4.3 MEQ/L (ref 3.5–5.1)
POTASSIUM SERPL-SCNC: 6.4 MEQ/L (ref 3.5–5.1)
RBC # BLD: 4.35 M/UL (ref 4.7–6.1)
SODIUM BLD-SCNC: 134 MEQ/L (ref 132–144)
STREP PNEUMO AG, UR: NEGATIVE
TOTAL CK: 104 U/L (ref 0–190)
TOTAL PROTEIN: 7.5 G/DL (ref 6.4–8.1)
TROPONIN: <0.01 NG/ML (ref 0–0.01)
VITAMIN D 25-HYDROXY: 32.1 NG/ML (ref 30–100)
WBC # BLD: 2.8 K/UL (ref 4.8–10.8)

## 2018-10-28 PROCEDURE — 94762 N-INVAS EAR/PLS OXIMTRY CONT: CPT

## 2018-10-28 PROCEDURE — G8989 SELF CARE D/C STATUS: HCPCS

## 2018-10-28 PROCEDURE — 84132 ASSAY OF SERUM POTASSIUM: CPT

## 2018-10-28 PROCEDURE — 99232 SBSQ HOSP IP/OBS MODERATE 35: CPT | Performed by: INTERNAL MEDICINE

## 2018-10-28 PROCEDURE — APPNB15 APP NON BILLABLE TIME 0-15 MINS: Performed by: NURSE PRACTITIONER

## 2018-10-28 PROCEDURE — 6360000002 HC RX W HCPCS: Performed by: INTERNAL MEDICINE

## 2018-10-28 PROCEDURE — 6370000000 HC RX 637 (ALT 250 FOR IP): Performed by: INTERNAL MEDICINE

## 2018-10-28 PROCEDURE — G8987 SELF CARE CURRENT STATUS: HCPCS

## 2018-10-28 PROCEDURE — 97165 OT EVAL LOW COMPLEX 30 MIN: CPT

## 2018-10-28 PROCEDURE — G8996 SWALLOW CURRENT STATUS: HCPCS

## 2018-10-28 PROCEDURE — 84100 ASSAY OF PHOSPHORUS: CPT

## 2018-10-28 PROCEDURE — 36415 COLL VENOUS BLD VENIPUNCTURE: CPT

## 2018-10-28 PROCEDURE — 1210000000 HC MED SURG R&B

## 2018-10-28 PROCEDURE — 83735 ASSAY OF MAGNESIUM: CPT

## 2018-10-28 PROCEDURE — 82306 VITAMIN D 25 HYDROXY: CPT

## 2018-10-28 PROCEDURE — 84484 ASSAY OF TROPONIN QUANT: CPT

## 2018-10-28 PROCEDURE — 2500000003 HC RX 250 WO HCPCS: Performed by: INTERNAL MEDICINE

## 2018-10-28 PROCEDURE — 70450 CT HEAD/BRAIN W/O DYE: CPT

## 2018-10-28 PROCEDURE — 92610 EVALUATE SWALLOWING FUNCTION: CPT

## 2018-10-28 PROCEDURE — 82550 ASSAY OF CK (CPK): CPT

## 2018-10-28 PROCEDURE — S0028 INJECTION, FAMOTIDINE, 20 MG: HCPCS | Performed by: INTERNAL MEDICINE

## 2018-10-28 PROCEDURE — 80053 COMPREHEN METABOLIC PANEL: CPT

## 2018-10-28 PROCEDURE — 85025 COMPLETE CBC W/AUTO DIFF WBC: CPT

## 2018-10-28 PROCEDURE — 2580000003 HC RX 258: Performed by: INTERNAL MEDICINE

## 2018-10-28 PROCEDURE — G8997 SWALLOW GOAL STATUS: HCPCS

## 2018-10-28 PROCEDURE — G8988 SELF CARE GOAL STATUS: HCPCS

## 2018-10-28 PROCEDURE — 82553 CREATINE MB FRACTION: CPT

## 2018-10-28 PROCEDURE — 2700000000 HC OXYGEN THERAPY PER DAY

## 2018-10-28 PROCEDURE — G8998 SWALLOW D/C STATUS: HCPCS

## 2018-10-28 PROCEDURE — 94640 AIRWAY INHALATION TREATMENT: CPT

## 2018-10-28 RX ORDER — PROMETHAZINE HYDROCHLORIDE 25 MG/ML
12.5 INJECTION, SOLUTION INTRAMUSCULAR; INTRAVENOUS EVERY 4 HOURS PRN
Status: DISCONTINUED | OUTPATIENT
Start: 2018-10-28 | End: 2018-10-30 | Stop reason: HOSPADM

## 2018-10-28 RX ORDER — MORPHINE SULFATE 4 MG/ML
4 INJECTION, SOLUTION INTRAMUSCULAR; INTRAVENOUS EVERY 4 HOURS PRN
Status: DISCONTINUED | OUTPATIENT
Start: 2018-10-28 | End: 2018-10-29

## 2018-10-28 RX ORDER — MORPHINE SULFATE 2 MG/ML
2 INJECTION, SOLUTION INTRAMUSCULAR; INTRAVENOUS EVERY 4 HOURS PRN
Status: DISCONTINUED | OUTPATIENT
Start: 2018-10-28 | End: 2018-10-29

## 2018-10-28 RX ORDER — NICOTINE POLACRILEX 4 MG
15 LOZENGE BUCCAL PRN
Status: DISCONTINUED | OUTPATIENT
Start: 2018-10-28 | End: 2018-10-30 | Stop reason: HOSPADM

## 2018-10-28 RX ORDER — DEXTROSE MONOHYDRATE 50 MG/ML
100 INJECTION, SOLUTION INTRAVENOUS PRN
Status: DISCONTINUED | OUTPATIENT
Start: 2018-10-28 | End: 2018-10-30 | Stop reason: HOSPADM

## 2018-10-28 RX ORDER — METHYLPREDNISOLONE SODIUM SUCCINATE 40 MG/ML
40 INJECTION, POWDER, LYOPHILIZED, FOR SOLUTION INTRAMUSCULAR; INTRAVENOUS DAILY
Status: DISCONTINUED | OUTPATIENT
Start: 2018-10-28 | End: 2018-10-30

## 2018-10-28 RX ORDER — THIAMINE MONONITRATE (VIT B1) 100 MG
100 TABLET ORAL DAILY
Status: DISCONTINUED | OUTPATIENT
Start: 2018-10-28 | End: 2018-10-30 | Stop reason: HOSPADM

## 2018-10-28 RX ORDER — PROMETHAZINE HYDROCHLORIDE 25 MG/1
25 TABLET ORAL EVERY 6 HOURS PRN
Status: DISCONTINUED | OUTPATIENT
Start: 2018-10-28 | End: 2018-10-30 | Stop reason: HOSPADM

## 2018-10-28 RX ORDER — DEXTROSE MONOHYDRATE 25 G/50ML
12.5 INJECTION, SOLUTION INTRAVENOUS PRN
Status: DISCONTINUED | OUTPATIENT
Start: 2018-10-28 | End: 2018-10-30 | Stop reason: HOSPADM

## 2018-10-28 RX ORDER — OXYCODONE HYDROCHLORIDE AND ACETAMINOPHEN 5; 325 MG/1; MG/1
2 TABLET ORAL EVERY 4 HOURS PRN
Status: DISCONTINUED | OUTPATIENT
Start: 2018-10-28 | End: 2018-10-30 | Stop reason: HOSPADM

## 2018-10-28 RX ORDER — OXYCODONE HYDROCHLORIDE AND ACETAMINOPHEN 5; 325 MG/1; MG/1
1 TABLET ORAL EVERY 8 HOURS PRN
Qty: 30 TABLET | Refills: 0 | Status: SHIPPED | OUTPATIENT
Start: 2018-10-28 | End: 2018-10-30

## 2018-10-28 RX ADMIN — Medication 100 MG: at 20:43

## 2018-10-28 RX ADMIN — ASCORBIC ACID, VITAMIN A PALMITATE, CHOLECALCIFEROL, THIAMINE HYDROCHLORIDE, RIBOFLAVIN-5 PHOSPHATE SODIUM, PYRIDOXINE HYDROCHLORIDE, NIACINAMIDE, DEXPANTHENOL, ALPHA-TOCOPHEROL ACETATE, VITAMIN K1, FOLIC ACID, BIOTIN, CYANOCOBALAMIN: 200; 3300; 200; 6; 3.6; 6; 40; 15; 10; 150; 600; 60; 5 INJECTION, SOLUTION INTRAVENOUS at 15:12

## 2018-10-28 RX ADMIN — DARUNAVIR ETHANOLATE AND COBICISTAT 1 EACH: 800; 150 TABLET, FILM COATED ORAL at 08:52

## 2018-10-28 RX ADMIN — ONDANSETRON 4 MG: 2 INJECTION INTRAMUSCULAR; INTRAVENOUS at 14:20

## 2018-10-28 RX ADMIN — IPRATROPIUM BROMIDE AND ALBUTEROL SULFATE 1 AMPULE: .5; 3 SOLUTION RESPIRATORY (INHALATION) at 22:05

## 2018-10-28 RX ADMIN — OXYCODONE AND ACETAMINOPHEN 1 TABLET: 5; 325 TABLET ORAL at 14:23

## 2018-10-28 RX ADMIN — FLUCONAZOLE 400 MG: 400 INJECTION, SOLUTION INTRAVENOUS at 00:49

## 2018-10-28 RX ADMIN — NYSTATIN 500000 UNITS: 500000 SUSPENSION ORAL at 08:52

## 2018-10-28 RX ADMIN — FOLIC ACID 1 MG: 1 TABLET ORAL at 08:52

## 2018-10-28 RX ADMIN — NYSTATIN 500000 UNITS: 500000 SUSPENSION ORAL at 20:43

## 2018-10-28 RX ADMIN — OXYCODONE AND ACETAMINOPHEN 1 TABLET: 5; 325 TABLET ORAL at 20:44

## 2018-10-28 RX ADMIN — EMTRICITABINE AND TENOFOVIR DISOPROXIL FUMARATE 1 TABLET: 200; 300 TABLET, FILM COATED ORAL at 11:24

## 2018-10-28 RX ADMIN — NYSTATIN 500000 UNITS: 500000 SUSPENSION ORAL at 16:42

## 2018-10-28 RX ADMIN — NYSTATIN 500000 UNITS: 500000 SUSPENSION ORAL at 14:10

## 2018-10-28 RX ADMIN — ONDANSETRON 4 MG: 2 INJECTION INTRAMUSCULAR; INTRAVENOUS at 20:30

## 2018-10-28 RX ADMIN — PROMETHAZINE HYDROCHLORIDE 12.5 MG: 25 INJECTION INTRAMUSCULAR; INTRAVENOUS at 21:51

## 2018-10-28 RX ADMIN — DOCUSATE SODIUM 100 MG: 100 CAPSULE, LIQUID FILLED ORAL at 20:44

## 2018-10-28 RX ADMIN — Medication 10 ML: at 20:31

## 2018-10-28 RX ADMIN — DOCUSATE SODIUM 100 MG: 100 CAPSULE, LIQUID FILLED ORAL at 08:52

## 2018-10-28 RX ADMIN — FAMOTIDINE 20 MG: 10 INJECTION, SOLUTION INTRAVENOUS at 20:43

## 2018-10-28 RX ADMIN — FAMOTIDINE 20 MG: 20 TABLET ORAL at 08:52

## 2018-10-28 ASSESSMENT — ENCOUNTER SYMPTOMS
GASTROINTESTINAL NEGATIVE: 1
ALLERGIC/IMMUNOLOGIC NEGATIVE: 1
COUGH: 0
STRIDOR: 0
APNEA: 0
CHEST TIGHTNESS: 0
CHOKING: 0
EYES NEGATIVE: 1
SHORTNESS OF BREATH: 0
WHEEZING: 0

## 2018-10-28 ASSESSMENT — PAIN SCALES - GENERAL
PAINLEVEL_OUTOF10: 7
PAINLEVEL_OUTOF10: 6

## 2018-10-28 NOTE — PROGRESS NOTES
1. Functional Status: Fall precautions. 2. Bowel Regimen: stool softners PRN ordered    Diet: DIET GENERAL;  Dietary Nutrition Supplements: Standard High Calorie Oral Supplement  Dietary Nutrition Supplements: Frozen Oral Supplement  3. CLINIMIX  4. Advance Directive: Full Code   5. DVT prophylaxis: Lovenox held SCDs only  6. Discharge planning: SW on board. Will discharge once medically stable and cleared by all consultants. 7. High Risk Readmission Screening Tool Score Noted. 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 *      ** Total time spent reviewing medical records, evaluating patient, speaking with RN's and consultants where I was focused exclusively on this patient: 35 minutes. Subjective:   Admit Date: 10/26/2018  PCP: Kal Robert MD    No acute events overnight. Telemetry reviewed. Very nauseated today Threw up after breakfast and lunch. Swallowing pain better controlled. Still has dry (+) cough, SOB with exertion and (+) fatigue. Denies N/V, fevers or chills.     Objective:     Vitals:    10/27/18 1927 10/27/18 1941 10/28/18 0530 10/28/18 0755   BP: 125/69   102/67   Pulse: 81   52   Resp: 18 18 19   Temp: 98.2 °F (36.8 °C)   97.7 °F (36.5 °C)   TempSrc: Oral   Oral   SpO2: 100% 98%  100%   Weight:   140 lb 12.8 oz (63.9 kg)    Height:         General appearance: Mild acute distress, lethargic responses appropriate but sluggish mentation (+) temporal wasting,

## 2018-10-28 NOTE — PROGRESS NOTES
[] Patient reports pain at acceptable level for treatment  [] Nursing notified    [x] Other Patient reports that he was recently medicated    Objective:  Observation:  Patient is very thin. Min atrophy noted in the hand and wrists L>R. Has a bump on his right wrist that can be moved around and patient reports that it does not hurt.    Orientation: Oriented to  [x] Person   [x] Place  [x]Time    Vision:   [x]  WFL   [] Impaired  Comments:  Wears glasses at all time    Hearing:  [x] [facility]   [] Impaired  Comments:    Sensation:   [x] WFL   []  Impaired   Comments:   Fingers feel a little numb that occurs more when it is cold   Cognition:   [x] WFL   [] Impaired  Comments:    Communication:   [x] WFL   [] Impaired  Comments:    Hand Dominance: [x] Right   [] Left    Range of Motion:  R UE AROM/PROM: [x]  WFL [] Impaired  Comments:   L UE AROM/PROM:  [x]  WFL [] Impaired  Comments:     Strength:   R UE Strength: []1    [] 2   [] 2+   [] 3   [] 3+   [] 4   [x] 4+  [] 5  Comments:   L UE Strength:  []1    [] 2   [] 2+   [] 3   [] 3+  [] 4   [x] 4+   [] 5  Comments:     Quality of Movement:  [x] Good   [] Fair   [] Poor     Coordination:  Gross motor: [x] WFL   [] Impaired   Fine motor: [x] WFL   [] Impaired     Functional Mobility:  Toilet Transfers:  independent  Bed Transfer:  independent  Sit to stand: independent  Bed to Chair:  NT started and ended eval in the bed    Seated Balance:      Static: [x] Good  [] Fair   [] Poor   Dynamic: [x]  Good  [] Fair   [] Poor     Standing Balance:     Static: [x] Good   [] Fair  [] Poor   Dynamic: [x] Good   [] Fair   [] Poor     Functional Endurance: [x] Good  [] Fair  [] Poor     ADLs  Feeding:  independent  UE Dressing:  independent  LB Dressing:  independent  Bathing:  independent  Toileting: independent  Grooming: independent    Treatment Plan will consist of: N/A   [] ADL Training   [] Strengthening   [] Endurance   [] Transfer Training   []  DME ed      [] HEP  [] Manual

## 2018-10-29 ENCOUNTER — TELEPHONE (OUTPATIENT)
Dept: INFECTIOUS DISEASES | Age: 33
End: 2018-10-29

## 2018-10-29 ENCOUNTER — ANESTHESIA EVENT (OUTPATIENT)
Dept: OPERATING ROOM | Age: 33
DRG: 974 | End: 2018-10-29
Payer: COMMERCIAL

## 2018-10-29 LAB
ADENOVIRUS SPECIES BE: NOT DETECTED
ADENOVIRUS SPECIES C: NOT DETECTED
HUMAN METAPNEUMOVIRUS PCR: NOT DETECTED
INFLUENZA A BY PCR: NOT DETECTED
INFLUENZA A H1 (2009) PCR: NOT DETECTED
INFLUENZA A H1 (2009) PCR: NOT DETECTED
INFLUENZA A H3 PCR: NOT DETECTED
INFLUENZA B BY PCR: NOT DETECTED
L. PNEUMOPHILA SEROGP 1 UR AG: NEGATIVE
LV EF: 65 %
LVEF MODALITY: NORMAL
PARAINFLUENZA 2: NOT DETECTED
PARAINFLUENZA 3: NOT DETECTED
PARAINFLUENZA1: NOT DETECTED
RHINOVIRUS RNA XXX PCR: NOT DETECTED
RSV A AB BY PCR: NOT DETECTED
RSV B AB BY PCR: NOT DETECTED
RVP SOURCE: NORMAL

## 2018-10-29 PROCEDURE — 2500000003 HC RX 250 WO HCPCS: Performed by: INTERNAL MEDICINE

## 2018-10-29 PROCEDURE — 99232 SBSQ HOSP IP/OBS MODERATE 35: CPT | Performed by: INTERNAL MEDICINE

## 2018-10-29 PROCEDURE — 6370000000 HC RX 637 (ALT 250 FOR IP): Performed by: INTERNAL MEDICINE

## 2018-10-29 PROCEDURE — 6360000002 HC RX W HCPCS: Performed by: INTERNAL MEDICINE

## 2018-10-29 PROCEDURE — S0028 INJECTION, FAMOTIDINE, 20 MG: HCPCS | Performed by: INTERNAL MEDICINE

## 2018-10-29 PROCEDURE — 93306 TTE W/DOPPLER COMPLETE: CPT

## 2018-10-29 PROCEDURE — 2580000003 HC RX 258: Performed by: INTERNAL MEDICINE

## 2018-10-29 PROCEDURE — 93010 ELECTROCARDIOGRAM REPORT: CPT | Performed by: INTERNAL MEDICINE

## 2018-10-29 PROCEDURE — APPNB60 APP NON BILLABLE TIME 46-60 MINS: Performed by: NURSE PRACTITIONER

## 2018-10-29 PROCEDURE — APPSS15 APP SPLIT SHARED TIME 0-15 MINUTES: Performed by: PHYSICIAN ASSISTANT

## 2018-10-29 PROCEDURE — 94150 VITAL CAPACITY TEST: CPT

## 2018-10-29 PROCEDURE — 94640 AIRWAY INHALATION TREATMENT: CPT

## 2018-10-29 PROCEDURE — 1210000000 HC MED SURG R&B

## 2018-10-29 PROCEDURE — 94760 N-INVAS EAR/PLS OXIMETRY 1: CPT

## 2018-10-29 PROCEDURE — 94762 N-INVAS EAR/PLS OXIMTRY CONT: CPT

## 2018-10-29 RX ADMIN — MORPHINE SULFATE 2 MG: 2 INJECTION, SOLUTION INTRAMUSCULAR; INTRAVENOUS at 02:26

## 2018-10-29 RX ADMIN — IPRATROPIUM BROMIDE AND ALBUTEROL SULFATE 1 AMPULE: .5; 3 SOLUTION RESPIRATORY (INHALATION) at 07:15

## 2018-10-29 RX ADMIN — OXYCODONE AND ACETAMINOPHEN 2 TABLET: 5; 325 TABLET ORAL at 20:24

## 2018-10-29 RX ADMIN — AZITHROMYCIN MONOHYDRATE 500 MG: 500 INJECTION, POWDER, LYOPHILIZED, FOR SOLUTION INTRAVENOUS at 00:50

## 2018-10-29 RX ADMIN — NYSTATIN 500000 UNITS: 500000 SUSPENSION ORAL at 14:12

## 2018-10-29 RX ADMIN — AZITHROMYCIN MONOHYDRATE 500 MG: 500 INJECTION, POWDER, LYOPHILIZED, FOR SOLUTION INTRAVENOUS at 23:54

## 2018-10-29 RX ADMIN — OXYCODONE AND ACETAMINOPHEN 2 TABLET: 5; 325 TABLET ORAL at 09:29

## 2018-10-29 RX ADMIN — IPRATROPIUM BROMIDE AND ALBUTEROL SULFATE 1 AMPULE: .5; 3 SOLUTION RESPIRATORY (INHALATION) at 10:58

## 2018-10-29 RX ADMIN — FLUCONAZOLE 400 MG: 400 INJECTION, SOLUTION INTRAVENOUS at 02:31

## 2018-10-29 RX ADMIN — OXYCODONE AND ACETAMINOPHEN 2 TABLET: 5; 325 TABLET ORAL at 14:15

## 2018-10-29 RX ADMIN — DARUNAVIR ETHANOLATE AND COBICISTAT 1 EACH: 800; 150 TABLET, FILM COATED ORAL at 09:17

## 2018-10-29 RX ADMIN — NYSTATIN 500000 UNITS: 500000 SUSPENSION ORAL at 17:41

## 2018-10-29 RX ADMIN — SULFAMETHOXAZOLE AND TRIMETHOPRIM 1 TABLET: 800; 160 TABLET ORAL at 09:17

## 2018-10-29 RX ADMIN — CEFTRIAXONE SODIUM 1 G: 1 INJECTION, POWDER, FOR SOLUTION INTRAMUSCULAR; INTRAVENOUS at 00:11

## 2018-10-29 RX ADMIN — Medication 10 ML: at 20:25

## 2018-10-29 RX ADMIN — NYSTATIN 500000 UNITS: 500000 SUSPENSION ORAL at 20:24

## 2018-10-29 RX ADMIN — EMTRICITABINE AND TENOFOVIR DISOPROXIL FUMARATE 1 TABLET: 200; 300 TABLET, FILM COATED ORAL at 09:17

## 2018-10-29 RX ADMIN — FAMOTIDINE 20 MG: 10 INJECTION, SOLUTION INTRAVENOUS at 20:24

## 2018-10-29 RX ADMIN — METHYLPREDNISOLONE SODIUM SUCCINATE 40 MG: 40 INJECTION, POWDER, FOR SOLUTION INTRAMUSCULAR; INTRAVENOUS at 04:37

## 2018-10-29 RX ADMIN — FAMOTIDINE 20 MG: 10 INJECTION, SOLUTION INTRAVENOUS at 09:16

## 2018-10-29 RX ADMIN — CEFTRIAXONE SODIUM 1 G: 1 INJECTION, POWDER, FOR SOLUTION INTRAMUSCULAR; INTRAVENOUS at 23:55

## 2018-10-29 RX ADMIN — METHYLPREDNISOLONE SODIUM SUCCINATE 40 MG: 40 INJECTION, POWDER, FOR SOLUTION INTRAMUSCULAR; INTRAVENOUS at 20:24

## 2018-10-29 RX ADMIN — FOLIC ACID 1 MG: 1 TABLET ORAL at 09:17

## 2018-10-29 RX ADMIN — Medication 10 ML: at 09:20

## 2018-10-29 RX ADMIN — Medication 100 MG: at 09:16

## 2018-10-29 RX ADMIN — NYSTATIN 500000 UNITS: 500000 SUSPENSION ORAL at 09:17

## 2018-10-29 RX ADMIN — IPRATROPIUM BROMIDE AND ALBUTEROL SULFATE 1 AMPULE: .5; 3 SOLUTION RESPIRATORY (INHALATION) at 16:14

## 2018-10-29 RX ADMIN — IPRATROPIUM BROMIDE AND ALBUTEROL SULFATE 1 AMPULE: .5; 3 SOLUTION RESPIRATORY (INHALATION) at 19:07

## 2018-10-29 ASSESSMENT — ENCOUNTER SYMPTOMS
CHEST TIGHTNESS: 0
BLOOD IN STOOL: 0
EYES NEGATIVE: 1
GASTROINTESTINAL NEGATIVE: 1
SORE THROAT: 1
COUGH: 1
WHEEZING: 0
SHORTNESS OF BREATH: 0
NAUSEA: 0
STRIDOR: 0

## 2018-10-29 ASSESSMENT — PAIN DESCRIPTION - LOCATION: LOCATION: BACK

## 2018-10-29 ASSESSMENT — PAIN DESCRIPTION - DESCRIPTORS: DESCRIPTORS: ACHING

## 2018-10-29 ASSESSMENT — PAIN DESCRIPTION - ORIENTATION: ORIENTATION: LOWER

## 2018-10-29 ASSESSMENT — PAIN SCALES - GENERAL
PAINLEVEL_OUTOF10: 5
PAINLEVEL_OUTOF10: 8

## 2018-10-29 ASSESSMENT — PAIN DESCRIPTION - PAIN TYPE: TYPE: CHRONIC PAIN

## 2018-10-29 NOTE — CONSULTS
promethazine, morphine, morphine, oxyCODONE-acetaminophen, oxyCODONE-acetaminophen, zolpidem, LORazepam, sodium chloride flush, magnesium hydroxide, ondansetron, acetaminophen  .  PN-Adult premixed (4.25/10) Peripheral solution with electrolytes 100 mL/hr at 10/29/18 0522    dextrose      PN-Adult premixed (4.25/10) Peripheral solution with electrolytes      [START ON 10/30/2018] PN-Adult premixed (4.25/10) Peripheral solution with electrolytes          Allergies: Allergies   Allergen Reactions    Pcn [Penicillins] Nausea Only     PCN    Tramadol     Codeine Itching    Ketorolac Rash    Toradol [Ketorolac Tromethamine] Nausea Only        Review of Systems:       [x] CV, Resp, Neuro, , and all other systems reviewed and negative other than listed in HPI.     Objective Findings:     Vitals:   Vitals:    10/29/18 0058 10/29/18 0145 10/29/18 0715 10/29/18 0829   BP: 119/68   125/73   Pulse: 65  57 68   Resp: 16  16    Temp: 97.9 °F (36.6 °C)   98.4 °F (36.9 °C)   TempSrc: Oral   Oral   SpO2: 96% 98% 98% 96%   Weight:       Height:          Physical Examination:  General: No apparent distress, alert and oriented  HEENT: Normocephalic, no scleral icterus. Neck: No JVD. Heart: Regular, no murmur, no rub/gallop. No RV heave. Lungs: diminished to ascultation, no rales/wheezing/rhonchi. Good chest wall excursion. Abdomen: Distension -none,  Soft, tenderness- none, Bowel sounds normal   Extremities: No clubbing/cyanosis, no edema. Skin: Warm, dry, normal turgor, no rash, no bruise, no petichiae. Neuro: No myoclonus or tremor.    Psych: Normal affect    Results/ Medications reviewed 10/29/2018, 10:53 AM     Laboratory, Microbiology, Pathology, Radiology, Cardiology, Medications and Transcriptions reviewed  Scheduled Meds:   thiamine  100 mg Oral Daily    famotidine (PEPCID) injection  20 mg Intravenous BID    methylPREDNISolone  40 mg Intravenous Daily    ipratropium-albuterol  1 ampule Inhalation Q4H WA    folic acid  1 mg Oral Daily    sodium chloride flush  10 mL Intravenous 2 times per day    nystatin  5 mL Oral 4x Daily    fluconazole  400 mg Intravenous Q24H    azithromycin  500 mg Intravenous Q24H    cefTRIAXone (ROCEPHIN) IV  1 g Intravenous Q24H    sulfamethoxazole-trimethoprim  1 tablet Oral Once per day on Mon Wed Fri    darunavir-cobicistat  1 tablet Oral Daily    emtricitabine-tenofovir  1 tablet Oral Daily     Continuous Infusions:   PN-Adult premixed (4.25/10) Peripheral solution with electrolytes 100 mL/hr at 10/29/18 0522    dextrose      PN-Adult premixed (4.25/10) Peripheral solution with electrolytes      [START ON 10/30/2018] PN-Adult premixed (4.25/10) Peripheral solution with electrolytes         Recent Labs      10/27/18   0657  10/28/18   1351   WBC  3.0*  2.8*   HGB  12.6*  13.7*   HCT  36.6*  40.0*   MCV  91.6  91.8   PLT  89*  94*     Recent Labs      10/27/18   0657  10/28/18   1351  10/28/18   1944   NA  141  134   --    K  4.2  6.4*  4.3   CL  102  100   --    CO2  25  26   --    PHOS   --   3.4   --    BUN  12  18   --    CREATININE  0.94  0.93   --      Recent Labs      10/27/18   0657  10/28/18   1351   AST  23  27   ALT  25  32   BILITOT  0.3  0.3   ALKPHOS  44  46     No results for input(s): LIPASE, AMYLASE in the last 72 hours. Recent Labs      10/27/18   0657  10/28/18   1351   PROT  6.5  7.5     Xr Chest Standard (2 Vw)  Result Date: 10/27/2018  NO EVIDENCE OF ACTIVE CARDIOPULMONARY DISEASE. Xr Lumbar Spine (2-3 Views)  Result Date: 10/27/2018  NEGATIVE LUMBAR SPINE. Ct Head Wo Contrast  Result Date: 10/29/2018  NEGATIVE CT SCAN OF THE BRAIN WITHOUT CONTRAST. All CT scans at this facility use dose modulation, iterative reconstruction, and/or weight based dosing when appropriate to reduce radiation dose to as low as reasonably achievable. Ct Chest Wo Contrast  Result Date: 10/27/2018  STABLE RIGHT LUNG NODULES.  POSTOPERATIVE CHANGES OF THE RIGHT

## 2018-10-29 NOTE — FLOWSHEET NOTE
Vitals obtained. A.M. Assessment complete. Pt resting. Pt medicated with percocet for pain 8/10 in back. No sob noted. Pt getting ECHO now. No current needs at this time. Call light within reach. Electronically signed by Usha Schmidt.  Quintin Huddleston on 10/29/2018 at 11:35 AM

## 2018-10-29 NOTE — PROGRESS NOTES
Negative. Skin: Negative. Neurological: Positive for weakness. Negative for dizziness, syncope and light-headedness. Hematological: Negative. Psychiatric/Behavioral: Negative. Physical Examination:    /68   Pulse 57   Temp 97.9 °F (36.6 °C) (Oral)   Resp 16   Ht 6' (1.829 m)   Wt 140 lb 12.8 oz (63.9 kg)   SpO2 98%   BMI 19.10 kg/m²    Physical Exam   Constitutional: He appears cachectic. No distress. He appears chronically ill and acutely ill. HENT:   Normal cephalic and Atraumatic   Eyes: Pupils are equal, round, and reactive to light. Neck: Normal range of motion and thyroid normal. Neck supple. No JVD present. No neck adenopathy. No thyromegaly present. Cardiovascular: Normal rate, regular rhythm, normal heart sounds, intact distal pulses and normal pulses. Pulmonary/Chest: Effort normal. He has no rales. He exhibits no tenderness. Decreased bs   Abdominal: Soft. Bowel sounds are normal. There is no tenderness. Musculoskeletal: Normal range of motion. He exhibits no edema or tenderness. Neurological: He is alert and oriented to person, place, and time. Skin: Skin is warm. No cyanosis. Nails show no clubbing.        LABS:  CBC:   Lab Results   Component Value Date    WBC 2.8 10/28/2018    RBC 4.35 10/28/2018    HGB 13.7 10/28/2018    HCT 40.0 10/28/2018    MCV 91.8 10/28/2018    MCH 31.4 10/28/2018    MCHC 34.2 10/28/2018    RDW 14.1 10/28/2018    PLT 94 10/28/2018    MPV 7.9 07/21/2014     CBC with Differential:    Lab Results   Component Value Date    WBC 2.8 10/28/2018    RBC 4.35 10/28/2018    HGB 13.7 10/28/2018    HCT 40.0 10/28/2018    PLT 94 10/28/2018    MCV 91.8 10/28/2018    MCH 31.4 10/28/2018    MCHC 34.2 10/28/2018    RDW 14.1 10/28/2018    BANDSPCT 5 10/27/2018    LYMPHOPCT 12.8 10/28/2018    MONOPCT 13.5 10/28/2018    BASOPCT 1.0 10/28/2018    MONOSABS 0.4 10/28/2018    LYMPHSABS 0.4 10/28/2018    EOSABS 0.0 10/28/2018    BASOSABS 0.0 10/28/2018 DIFFTYPE NOT REPORTED 07/21/2014     CMP:    Lab Results   Component Value Date     10/28/2018    K 4.3 10/28/2018     10/28/2018    CO2 26 10/28/2018    BUN 18 10/28/2018    CREATININE 0.93 10/28/2018    GFRAA >60.0 10/28/2018    LABGLOM >60.0 10/28/2018    GLUCOSE 65 10/28/2018    PROT 7.5 10/28/2018    LABALBU 4.1 10/28/2018    CALCIUM 9.1 10/28/2018    BILITOT 0.3 10/28/2018    ALKPHOS 46 10/28/2018    AST 27 10/28/2018    ALT 32 10/28/2018     BMP:    Lab Results   Component Value Date     10/28/2018    K 4.3 10/28/2018     10/28/2018    CO2 26 10/28/2018    BUN 18 10/28/2018    LABALBU 4.1 10/28/2018    CREATININE 0.93 10/28/2018    CALCIUM 9.1 10/28/2018    GFRAA >60.0 10/28/2018    LABGLOM >60.0 10/28/2018    GLUCOSE 65 10/28/2018     Magnesium:    Lab Results   Component Value Date    MG 2.4 10/28/2018     Troponin:    Lab Results   Component Value Date    TROPONINI <0.010 10/28/2018        Active Hospital Problems    Diagnosis Date Noted    Pneumonia associated with acquired immune deficiency syndrome (AIDS) ([facility] [B20, J18.9]      Priority: Low    Severe malnutrition ([facility] [E43] 10/27/2018     Priority: Low    AIDS (acquired immunodeficiency syndrome), CD4 <=200 ([facility] [B20] 10/27/2018     Priority: Low    AIDS due to HIV-I ([facility] [B20] 10/27/2018     Priority: Low    Chest pain on breathing [R07.1]      Priority: Low    Subcutaneous emphysema resulting from a procedure [T81.82XA]      Priority: Low    COPD with lower respiratory infection ([facility] [J44.0] 10/26/2018     Priority: Low    Anxiety [F41.9] 10/17/2018     Priority: Low    COPD (chronic obstructive pulmonary disease) ([facility] [J44.9] 07/06/2017     Priority: Low    Smoker-1/2ppd 15 yrs [F17.200] 07/15/2014     Priority: Low        Assessment/Plan:  CP noncardiac  Remote cardiomyopathy ~ 5 years ago. Echo not done yet. Will review.   AIDS  PNA          Electronically signed by Adria Byrnes MD on 10/29/2018 at 8:15 AM

## 2018-10-29 NOTE — PROGRESS NOTES
Hospitalist Progress Note      PCP: Tiki Rdz MD    Date of Admission: 10/26/2018    Chief Complaint:    No chief complaint on file. Subjective:  Patient denies fevers, chills, sweats; Gets some chest pain with eating; dysphagia/odynophagia are improving and today he is eating donut holes without issue. Denies burning micturition or diarrhea.   12 point ROS negative other than mentioned above     Medications:  Reviewed    Infusion Medications    PN-Adult premixed (4.25/10) Peripheral solution with electrolytes 100 mL/hr at 10/29/18 0522    dextrose      PN-Adult premixed (4.25/10) Peripheral solution with electrolytes      [START ON 10/30/2018] PN-Adult premixed (4.25/10) Peripheral solution with electrolytes       Scheduled Medications    thiamine  100 mg Oral Daily    famotidine (PEPCID) injection  20 mg Intravenous BID    methylPREDNISolone  40 mg Intravenous Daily    ipratropium-albuterol  1 ampule Inhalation D9L WA    folic acid  1 mg Oral Daily    sodium chloride flush  10 mL Intravenous 2 times per day    nystatin  5 mL Oral 4x Daily    fluconazole  400 mg Intravenous Q24H    azithromycin  500 mg Intravenous Q24H    cefTRIAXone (ROCEPHIN) IV  1 g Intravenous Q24H    sulfamethoxazole-trimethoprim  1 tablet Oral Once per day on Mon Wed Fri    darunavir-cobicistat  1 tablet Oral Daily    emtricitabine-tenofovir  1 tablet Oral Daily     PRN Meds: glucose, dextrose, glucagon (rDNA), dextrose, promethazine, promethazine, morphine, morphine, oxyCODONE-acetaminophen, oxyCODONE-acetaminophen, zolpidem, LORazepam, sodium chloride flush, magnesium hydroxide, ondansetron, acetaminophen      Intake/Output Summary (Last 24 hours) at 10/29/18 1131  Last data filed at 10/29/18 0919   Gross per 24 hour   Intake             2440 ml   Output             1650 ml   Net              790 ml       Exam:    /73   Pulse 88   Temp 98.4 °F (36.9 °C) (Oral)   Resp 16   Ht 6' (1.829 m)   Wt 140 lb low as reasonably achievable. XR LUMBAR SPINE (2-3 VIEWS)   Final Result      NEGATIVE LUMBAR SPINE. XR CHEST STANDARD (2 VW)   Final Result      NO EVIDENCE OF ACTIVE CARDIOPULMONARY DISEASE. CT CHEST WO CONTRAST   Final Result      STABLE RIGHT LUNG NODULES. POSTOPERATIVE CHANGES OF THE RIGHT LUNG APEX MEDIALLY. PROBABLE MINIMAL BIBASILAR ATELECTASIS POSTERIORLY. OTHERWISE, NEGATIVE CHEST CT. Assessment/Plan:    1. Acute COPD exacerbation failed outpatient treatment in setting of severely immune compromised individual very high risk for opportunistic infections:         - Pulm is managing; currently on IV Steroids still; lungs sound clear today  2. Newly Diagnosed HIV-1 infection with AIDS defining illnesses ( Esophageal candidiasis, wasting syndrome):        - ID is managing  3. Esophageal Candidiasis suspected with dysphagia and odynophagia:       - Discussed with GI patients expectation for EGD  4. Severe protein calorie malnutrition likely wasting syndrome in setting of AIDS:        - Discussed need to take in more nutritious foods; currently eating donut holes and tolerating well       - Should improve as issue #2 and #3 are managed       - Would not resume clinimix  5. Pancytopenia: Due to HIV/AIDs; SCDs for DVT ppx.  6. Generalized Weakness and Gait Instability: PT/OT  7. Tobacco and nicotine abuse and dependence: Counseled by previous doctor  8. Insomnia due to acute stress and anxiety related to new diagnosis: Continue Sleep aid   9.  Chronic lower back pain due to spondylosis: PO PRN pain meds; stopping IV narcotics    Active Hospital Problems    Diagnosis Date Noted    Pneumonia associated with acquired immune deficiency syndrome (AIDS) ([address] [B20, J18.9]     Severe malnutrition ([address] [E43] 10/27/2018    AIDS (acquired immunodeficiency syndrome), CD4 <=200 ([address] [B20] 10/27/2018    AIDS due to HIV-I ([address] [B20] 10/27/2018    Chest pain on breathing [R07.1]     Subcutaneous emphysema resulting from a procedure [T81.82XA]     COPD with lower respiratory infection ([facility] [J44.0] 10/26/2018    Anxiety [F41.9] 10/17/2018    COPD (chronic obstructive pulmonary disease) ([facility] [J44.9] 07/06/2017    Smoker-1/2ppd 15 yrs [F17.200] 07/15/2014   Additional work up or/and treatment plan may be added today or then after based on clinical progression. I am managing a portion of pt care. Some medical issues are handled by other specialists. Additional work up and treatment should be done in out pt setting by pt PCP and other out pt providers. In addition to examining and evaluating pt, I spent additional time explaining care, normal and abnormal findings, and treatment plan. All of pt questions were answered. Counseling, diet and education were  provided. Case will be discussed with nursing staff when appropriate. Family will be updated if and when appropriate.       Diet: DIET GENERAL;  Dietary Nutrition Supplements: Standard High Calorie Oral Supplement  Dietary Nutrition Supplements: Frozen Oral Supplement  Diet NPO Time Specified  Dietary Nutrition Supplements: Frozen Oral Supplement    Code Status: Full Code    PT/OT Eval           Electronically signed by Chika Quinonez MD on 10/29/2018 at 11:31 AM

## 2018-10-29 NOTE — PROGRESS NOTES
URI  2. Stable lung nodule, with no evidence of lung disease, no progressive nodular disease or other abnormalities    · Patient is on empiric antibiotic therapy  · Solu medrol 40 mg QD  · Duoneb therapy every 4 hours while awake  · Oxygen for a goal of 92% or above  · Per Dr. Yanna Lopez consult, no further work up or evaluation recommended for the nodular density at this point, continue to monitor radio graphically in the future  · Appreciate recommendations per cardiology  · Recommendations per infectious disease         Electronically signed by Adam Richardson PA-C on 10/29/2018 at 11:06 AM

## 2018-10-30 ENCOUNTER — ANESTHESIA (OUTPATIENT)
Dept: OPERATING ROOM | Age: 33
DRG: 974 | End: 2018-10-30
Payer: COMMERCIAL

## 2018-10-30 VITALS
SYSTOLIC BLOOD PRESSURE: 110 MMHG | OXYGEN SATURATION: 99 % | WEIGHT: 140.8 LBS | HEIGHT: 72 IN | DIASTOLIC BLOOD PRESSURE: 55 MMHG | TEMPERATURE: 99.2 F | BODY MASS INDEX: 19.07 KG/M2 | HEART RATE: 81 BPM | RESPIRATION RATE: 17 BRPM

## 2018-10-30 VITALS
DIASTOLIC BLOOD PRESSURE: 73 MMHG | RESPIRATION RATE: 12 BRPM | SYSTOLIC BLOOD PRESSURE: 128 MMHG | OXYGEN SATURATION: 100 %

## 2018-10-30 LAB
ANA IGG, ELISA: NORMAL
EKG ATRIAL RATE: 57 BPM
EKG ATRIAL RATE: 63 BPM
EKG P AXIS: 67 DEGREES
EKG P AXIS: 76 DEGREES
EKG P-R INTERVAL: 128 MS
EKG P-R INTERVAL: 128 MS
EKG Q-T INTERVAL: 426 MS
EKG Q-T INTERVAL: 446 MS
EKG QRS DURATION: 84 MS
EKG QRS DURATION: 98 MS
EKG QTC CALCULATION (BAZETT): 434 MS
EKG QTC CALCULATION (BAZETT): 435 MS
EKG R AXIS: 56 DEGREES
EKG R AXIS: 66 DEGREES
EKG T AXIS: 57 DEGREES
EKG T AXIS: 65 DEGREES
EKG VENTRICULAR RATE: 57 BPM
EKG VENTRICULAR RATE: 63 BPM

## 2018-10-30 PROCEDURE — 6360000002 HC RX W HCPCS: Performed by: NURSE ANESTHETIST, CERTIFIED REGISTERED

## 2018-10-30 PROCEDURE — 2580000003 HC RX 258: Performed by: NURSE ANESTHETIST, CERTIFIED REGISTERED

## 2018-10-30 PROCEDURE — 88305 TISSUE EXAM BY PATHOLOGIST: CPT

## 2018-10-30 PROCEDURE — 43239 EGD BIOPSY SINGLE/MULTIPLE: CPT | Performed by: INTERNAL MEDICINE

## 2018-10-30 PROCEDURE — S0028 INJECTION, FAMOTIDINE, 20 MG: HCPCS | Performed by: INTERNAL MEDICINE

## 2018-10-30 PROCEDURE — 6360000002 HC RX W HCPCS: Performed by: INTERNAL MEDICINE

## 2018-10-30 PROCEDURE — 3700000001 HC ADD 15 MINUTES (ANESTHESIA): Performed by: INTERNAL MEDICINE

## 2018-10-30 PROCEDURE — 94664 DEMO&/EVAL PT USE INHALER: CPT

## 2018-10-30 PROCEDURE — 6370000000 HC RX 637 (ALT 250 FOR IP): Performed by: INTERNAL MEDICINE

## 2018-10-30 PROCEDURE — 88313 SPECIAL STAINS GROUP 2: CPT

## 2018-10-30 PROCEDURE — 2580000003 HC RX 258: Performed by: ANESTHESIOLOGY

## 2018-10-30 PROCEDURE — 7100000001 HC PACU RECOVERY - ADDTL 15 MIN: Performed by: INTERNAL MEDICINE

## 2018-10-30 PROCEDURE — 99232 SBSQ HOSP IP/OBS MODERATE 35: CPT | Performed by: INTERNAL MEDICINE

## 2018-10-30 PROCEDURE — 3700000000 HC ANESTHESIA ATTENDED CARE: Performed by: INTERNAL MEDICINE

## 2018-10-30 PROCEDURE — 2500000003 HC RX 250 WO HCPCS: Performed by: INTERNAL MEDICINE

## 2018-10-30 PROCEDURE — 2709999900 HC NON-CHARGEABLE SUPPLY: Performed by: INTERNAL MEDICINE

## 2018-10-30 PROCEDURE — 93005 ELECTROCARDIOGRAM TRACING: CPT

## 2018-10-30 PROCEDURE — 2580000003 HC RX 258: Performed by: INTERNAL MEDICINE

## 2018-10-30 PROCEDURE — 3609017100 HC EGD: Performed by: INTERNAL MEDICINE

## 2018-10-30 PROCEDURE — 0DB38ZX EXCISION OF LOWER ESOPHAGUS, VIA NATURAL OR ARTIFICIAL OPENING ENDOSCOPIC, DIAGNOSTIC: ICD-10-PCS | Performed by: INTERNAL MEDICINE

## 2018-10-30 PROCEDURE — 2500000003 HC RX 250 WO HCPCS: Performed by: NURSE ANESTHETIST, CERTIFIED REGISTERED

## 2018-10-30 PROCEDURE — 93010 ELECTROCARDIOGRAM REPORT: CPT | Performed by: INTERNAL MEDICINE

## 2018-10-30 PROCEDURE — 7100000000 HC PACU RECOVERY - FIRST 15 MIN: Performed by: INTERNAL MEDICINE

## 2018-10-30 RX ORDER — PREDNISONE 20 MG/1
40 TABLET ORAL DAILY
Qty: 6 TABLET | Refills: 0 | Status: SHIPPED | OUTPATIENT
Start: 2018-10-30 | End: 2018-11-02

## 2018-10-30 RX ORDER — LANOLIN ALCOHOL/MO/W.PET/CERES
100 CREAM (GRAM) TOPICAL DAILY
Qty: 30 TABLET | Refills: 3 | Status: SHIPPED | OUTPATIENT
Start: 2018-10-31

## 2018-10-30 RX ORDER — AZITHROMYCIN 500 MG/1
1000 TABLET, FILM COATED ORAL WEEKLY
Qty: 12 TABLET | Refills: 0 | Status: SHIPPED | OUTPATIENT
Start: 2018-10-30 | End: 2018-12-05

## 2018-10-30 RX ORDER — PANTOPRAZOLE SODIUM 40 MG/1
40 TABLET, DELAYED RELEASE ORAL
Qty: 30 TABLET | Refills: 3 | Status: SHIPPED | OUTPATIENT
Start: 2018-10-30 | End: 2018-12-14

## 2018-10-30 RX ORDER — FLUCONAZOLE 100 MG/1
200 TABLET ORAL DAILY
Qty: 24 TABLET | Refills: 0 | Status: SHIPPED | OUTPATIENT
Start: 2018-10-30 | End: 2018-11-11

## 2018-10-30 RX ORDER — IPRATROPIUM BROMIDE AND ALBUTEROL SULFATE 2.5; .5 MG/3ML; MG/3ML
1 SOLUTION RESPIRATORY (INHALATION) EVERY 4 HOURS PRN
Status: DISCONTINUED | OUTPATIENT
Start: 2018-10-30 | End: 2018-10-30 | Stop reason: HOSPADM

## 2018-10-30 RX ORDER — SODIUM CHLORIDE, SODIUM LACTATE, POTASSIUM CHLORIDE, CALCIUM CHLORIDE 600; 310; 30; 20 MG/100ML; MG/100ML; MG/100ML; MG/100ML
INJECTION, SOLUTION INTRAVENOUS
Status: COMPLETED
Start: 2018-10-30 | End: 2018-10-30

## 2018-10-30 RX ORDER — PANTOPRAZOLE SODIUM 40 MG/1
40 TABLET, DELAYED RELEASE ORAL
Status: DISCONTINUED | OUTPATIENT
Start: 2018-10-30 | End: 2018-10-30 | Stop reason: HOSPADM

## 2018-10-30 RX ORDER — SODIUM CHLORIDE 0.9 % (FLUSH) 0.9 %
SYRINGE (ML) INJECTION
Status: DISCONTINUED
Start: 2018-10-30 | End: 2018-10-30 | Stop reason: HOSPADM

## 2018-10-30 RX ORDER — LIDOCAINE HYDROCHLORIDE 20 MG/ML
INJECTION, SOLUTION INFILTRATION; PERINEURAL PRN
Status: DISCONTINUED | OUTPATIENT
Start: 2018-10-30 | End: 2018-10-30 | Stop reason: SDUPTHER

## 2018-10-30 RX ORDER — MAGNESIUM HYDROXIDE 1200 MG/15ML
LIQUID ORAL PRN
Status: DISCONTINUED | OUTPATIENT
Start: 2018-10-30 | End: 2018-10-30 | Stop reason: HOSPADM

## 2018-10-30 RX ORDER — ONDANSETRON 2 MG/ML
4 INJECTION INTRAMUSCULAR; INTRAVENOUS
Status: DISCONTINUED | OUTPATIENT
Start: 2018-10-30 | End: 2018-10-30 | Stop reason: HOSPADM

## 2018-10-30 RX ORDER — PROPOFOL 10 MG/ML
INJECTION, EMULSION INTRAVENOUS PRN
Status: DISCONTINUED | OUTPATIENT
Start: 2018-10-30 | End: 2018-10-30 | Stop reason: SDUPTHER

## 2018-10-30 RX ORDER — PREDNISONE 20 MG/1
40 TABLET ORAL DAILY
Status: DISCONTINUED | OUTPATIENT
Start: 2018-10-30 | End: 2018-10-30 | Stop reason: HOSPADM

## 2018-10-30 RX ORDER — OXYCODONE HYDROCHLORIDE AND ACETAMINOPHEN 5; 325 MG/1; MG/1
1 TABLET ORAL EVERY 8 HOURS PRN
Qty: 9 TABLET | Refills: 0 | Status: SHIPPED | OUTPATIENT
Start: 2018-10-30 | End: 2018-11-02

## 2018-10-30 RX ORDER — SULFAMETHOXAZOLE AND TRIMETHOPRIM 800; 160 MG/1; MG/1
1 TABLET ORAL
Qty: 3 TABLET | Refills: 0 | Status: SHIPPED | OUTPATIENT
Start: 2018-10-31 | End: 2018-11-10

## 2018-10-30 RX ORDER — SODIUM CHLORIDE, SODIUM LACTATE, POTASSIUM CHLORIDE, CALCIUM CHLORIDE 600; 310; 30; 20 MG/100ML; MG/100ML; MG/100ML; MG/100ML
INJECTION, SOLUTION INTRAVENOUS CONTINUOUS
Status: DISCONTINUED | OUTPATIENT
Start: 2018-10-30 | End: 2018-10-30 | Stop reason: HOSPADM

## 2018-10-30 RX ORDER — SODIUM CHLORIDE, SODIUM LACTATE, POTASSIUM CHLORIDE, CALCIUM CHLORIDE 600; 310; 30; 20 MG/100ML; MG/100ML; MG/100ML; MG/100ML
INJECTION, SOLUTION INTRAVENOUS CONTINUOUS PRN
Status: DISCONTINUED | OUTPATIENT
Start: 2018-10-30 | End: 2018-10-30 | Stop reason: SDUPTHER

## 2018-10-30 RX ADMIN — PROPOFOL 20 MG: 10 INJECTION, EMULSION INTRAVENOUS at 13:32

## 2018-10-30 RX ADMIN — OXYCODONE AND ACETAMINOPHEN 2 TABLET: 5; 325 TABLET ORAL at 15:15

## 2018-10-30 RX ADMIN — ZOLPIDEM TARTRATE 5 MG: 5 TABLET ORAL at 00:52

## 2018-10-30 RX ADMIN — OXYCODONE AND ACETAMINOPHEN 2 TABLET: 5; 325 TABLET ORAL at 05:28

## 2018-10-30 RX ADMIN — PROPOFOL 40 MG: 10 INJECTION, EMULSION INTRAVENOUS at 13:29

## 2018-10-30 RX ADMIN — PROPOFOL 50 MG: 10 INJECTION, EMULSION INTRAVENOUS at 13:27

## 2018-10-30 RX ADMIN — PROPOFOL 20 MG: 10 INJECTION, EMULSION INTRAVENOUS at 13:34

## 2018-10-30 RX ADMIN — OXYCODONE AND ACETAMINOPHEN 2 TABLET: 5; 325 TABLET ORAL at 00:52

## 2018-10-30 RX ADMIN — PROPOFOL 10 MG: 10 INJECTION, EMULSION INTRAVENOUS at 13:28

## 2018-10-30 RX ADMIN — LIDOCAINE HYDROCHLORIDE 40 MG: 20 INJECTION, SOLUTION INFILTRATION; PERINEURAL at 13:27

## 2018-10-30 RX ADMIN — SODIUM CHLORIDE, POTASSIUM CHLORIDE, SODIUM LACTATE AND CALCIUM CHLORIDE 1000 ML: 600; 310; 30; 20 INJECTION, SOLUTION INTRAVENOUS at 13:23

## 2018-10-30 RX ADMIN — SODIUM CHLORIDE, POTASSIUM CHLORIDE, SODIUM LACTATE AND CALCIUM CHLORIDE: 600; 310; 30; 20 INJECTION, SOLUTION INTRAVENOUS at 13:19

## 2018-10-30 RX ADMIN — FAMOTIDINE 20 MG: 10 INJECTION, SOLUTION INTRAVENOUS at 10:50

## 2018-10-30 RX ADMIN — FLUCONAZOLE 400 MG: 400 INJECTION, SOLUTION INTRAVENOUS at 00:53

## 2018-10-30 ASSESSMENT — LIFESTYLE VARIABLES: SMOKING_STATUS: 1

## 2018-10-30 ASSESSMENT — ENCOUNTER SYMPTOMS
GASTROINTESTINAL NEGATIVE: 1
WHEEZING: 0
BLOOD IN STOOL: 0
NAUSEA: 0
COUGH: 1
CHEST TIGHTNESS: 0
STRIDOR: 0
SHORTNESS OF BREATH: 1
EYES NEGATIVE: 1

## 2018-10-30 ASSESSMENT — PULMONARY FUNCTION TESTS
PIF_VALUE: 0

## 2018-10-30 ASSESSMENT — PAIN SCALES - GENERAL
PAINLEVEL_OUTOF10: 8
PAINLEVEL_OUTOF10: 8
PAINLEVEL_OUTOF10: 2
PAINLEVEL_OUTOF10: 7
PAINLEVEL_OUTOF10: 6

## 2018-10-30 ASSESSMENT — PAIN DESCRIPTION - LOCATION: LOCATION: BACK

## 2018-10-30 NOTE — PROGRESS NOTES
10/27/17)  · % Weight Change: 11%,  ~8-10 months, stated  · Ideal Body Wt: 178 lb (80.7 kg), % Ideal Body 80%  · BMI Classification: BMI 18.5 - 24.9 Normal Weight  · Comparative Standards (Estimated Nutrition Needs):  · Estimated Daily Total Kcal: 7665-2112  · Estimated Daily Protein (g):     Estimated Intake vs Estimated Needs: Intake Improving (prior to NPO)    Nutrition Risk Level: High    Nutrition Interventions:   Continue NPO (Resume General diet as tolerated with High Calorie oNS x3, Magic CUp x2 post EGD)  Continued Inpatient Monitoring, Education Not Indicated    Nutrition Evaluation:   · Evaluation: Progressing toward goals   · Goals: po > 75% meals and supplements, wt > 140#    · Monitoring: NPO Status, Diet Progression, Meal Intake, Supplement Intake, Diet Tolerance, Chewing/Swallowing, Weight    See Adult Nutrition Doc Flowsheet for more detail.      Electronically signed by Suki Goodrich RD, LD on 10/30/18 at 1:49 PM

## 2018-10-30 NOTE — ANESTHESIA PRE PROCEDURE
lisdexamfetamine (VYVANSE) 70 MG capsule Take 70 mg by mouth daily   Yes Historical Provider, MD HAYS  (90 BASE) MCG/ACT inhaler inhale 1-2 puffs BY MOUTH EVERY FOUR HOURS AS NEEDED for FOR WHEEZING 5/28/16   Historical Provider, MD       Current medications:    Current Facility-Administered Medications   Medication Dose Route Frequency Provider Last Rate Last Dose    ipratropium-albuterol (DUONEB) nebulizer solution 1 ampule  1 ampule Inhalation Q4H PRN Feliz Jones MD        predniSONE (DELTASONE) tablet 40 mg  40 mg Oral Daily Nereida Blanco MD        vitamin B-1 (THIAMINE) tablet 100 mg  100 mg Oral Daily Hai Bello MD   100 mg at 10/29/18 0916    famotidine (PEPCID) injection 20 mg  20 mg Intravenous BID Hai Bello MD   20 mg at 10/30/18 1050    glucose (GLUTOSE) 40 % oral gel 15 g  15 g Oral PRN Maria E Hollins MD        dextrose 50 % solution 12.5 g  12.5 g Intravenous PRN Hai Bello MD        glucagon (rDNA) injection 1 mg  1 mg Intramuscular PRN Maria E Hollins MD        dextrose 5 % solution  100 mL/hr Intravenous PRN Maria E Hollins MD        promethazine (PHENERGAN) injection 12.5 mg  12.5 mg Intramuscular Q4H PRN Maria E Hollins MD   12.5 mg at 10/28/18 2151    promethazine (PHENERGAN) tablet 25 mg  25 mg Oral Q6H PRN Hai Bello MD        oxyCODONE-acetaminophen (PERCOCET) 5-325 MG per tablet 2 tablet  2 tablet Oral Q4H PRN Hai Bello MD   2 tablet at 10/30/18 0528    oxyCODONE-acetaminophen (PERCOCET) 5-325 MG per tablet 1 tablet  1 tablet Oral Q4H PRN Hai Bello MD   1 tablet at 10/28/18 2044    zolpidem (AMBIEN) tablet 5 mg  5 mg Oral Nightly PRN Hai Bello MD   5 mg at 86/33/49 6097    folic acid (FOLVITE) tablet 1 mg  1 mg Oral Daily Hai Bello MD   1 mg at 10/29/18 0917    sodium chloride flush 0.9 % injection 10 mL  10 mL Intravenous 2 times per day Hai Bello MD   10 mL at Oral Oral   SpO2: 98% 100% 100% 98%   Weight:       Height:                                                  BP Readings from Last 3 Encounters:   10/30/18 (!) 97/55   10/26/18 119/85   08/08/17 (!) 126/91       NPO Status:                                                                                 BMI:   Wt Readings from Last 3 Encounters:   10/28/18 140 lb 12.8 oz (63.9 kg)   10/26/18 145 lb 12.8 oz (66.1 kg)   08/08/17 160 lb (72.6 kg)     Body mass index is 19.1 kg/m². CBC:   Lab Results   Component Value Date    WBC 2.8 10/28/2018    RBC 4.35 10/28/2018    HGB 13.7 10/28/2018    HCT 40.0 10/28/2018    MCV 91.8 10/28/2018    RDW 14.1 10/28/2018    PLT 94 10/28/2018       CMP:   Lab Results   Component Value Date     10/28/2018    K 4.3 10/28/2018     10/28/2018    CO2 26 10/28/2018    BUN 18 10/28/2018    CREATININE 0.93 10/28/2018    GFRAA >60.0 10/28/2018    LABGLOM >60.0 10/28/2018    GLUCOSE 65 10/28/2018    PROT 7.5 10/28/2018    CALCIUM 9.1 10/28/2018    BILITOT 0.3 10/28/2018    ALKPHOS 46 10/28/2018    AST 27 10/28/2018    ALT 32 10/28/2018       POC Tests: No results for input(s): POCGLU, POCNA, POCK, POCCL, POCBUN, POCHEMO, POCHCT in the last 72 hours.     Coags:   Lab Results   Component Value Date    PROTIME 11.7 07/15/2014    INR 1.1 07/15/2014       HCG (If Applicable): No results found for: PREGTESTUR, PREGSERUM, HCG, HCGQUANT     ABGs: No results found for: PHART, PO2ART, TYH8NQN, INI3IYA, BEART, I0XLHSSF     Type & Screen (If Applicable):  No results found for: LABABO, [address]    Anesthesia Evaluation  Patient summary reviewed and Nursing notes reviewed no history of anesthetic complications:   Airway: Mallampati: I  TM distance: >3 FB   Neck ROM: full  Mouth opening: > = 3 FB Dental: normal exam         Pulmonary:   (+) pneumonia:  COPD:  asthma: current smoker                           Cardiovascular:                      Neuro/Psych:   (+) headaches:, psychiatric history:

## 2018-10-30 NOTE — PROGRESS NOTES
--    CO2  26   --    BUN  18   --    CREATININE  0.93   --    GLUCOSE  65*   --    CALCIUM  9.1   --    PROT  7.5   --    LABALBU  4.1   --    BILITOT  0.3   --    ALKPHOS  46   --    AST  27   --    ALT  32   --    LABGLOM  >60.0   --    GFRAA  >60.0   --    GLOB  3.4   --        MV Settings:          No results for input(s): PHART, UVY3BKM, PO2ART, QHK6ONW, BEART, Z1GIHUUT in the last 72 hours. O2 Device: None (Room air)  O2 Flow Rate (L/min): 2 L/min    Diet NPO Time Specified     MEDICATIONS during current hospitalization:    Continuous Infusions:   dextrose         Scheduled Meds:   thiamine  100 mg Oral Daily    famotidine (PEPCID) injection  20 mg Intravenous BID    methylPREDNISolone  40 mg Intravenous Daily    folic acid  1 mg Oral Daily    sodium chloride flush  10 mL Intravenous 2 times per day    nystatin  5 mL Oral 4x Daily    fluconazole  400 mg Intravenous Q24H    azithromycin  500 mg Intravenous Q24H    cefTRIAXone (ROCEPHIN) IV  1 g Intravenous Q24H    sulfamethoxazole-trimethoprim  1 tablet Oral Once per day on Mon Wed Fri    darunavir-cobicistat  1 tablet Oral Daily    emtricitabine-tenofovir  1 tablet Oral Daily       PRN Meds:ipratropium-albuterol, glucose, dextrose, glucagon (rDNA), dextrose, promethazine, promethazine, oxyCODONE-acetaminophen, oxyCODONE-acetaminophen, zolpidem, sodium chloride flush, magnesium hydroxide, ondansetron, acetaminophen    Radiology  Xr Chest Standard (2 Vw)    Result Date: 10/27/2018  XR CHEST (2 VW): 10/26/2018 CLINICAL HISTORY: Chest pain and shortness of breath. COMPARISON: 7/12/2017. Upright PA and lateral radiographs of the chest were obtained. FINDINGS: Mild hyperinflation unchanged, with mild scarring and postoperative changes of the medial right lung apex, and a 5 mm granuloma in the mid to upper lung field. . There are no developing infiltrates, pleural effusion, cardiomegaly, vascular congestion, pneumothorax, or displaced fractures radiation dose to as low as reasonably achievable. FINDINGS: An approximately 6 mm in average dimension calcified granuloma within the lateral inferior aspect of the right upper lobe, and 3 mm granuloma of the central right lung base, are unchanged when compared to the prior study, without significant nodules identified elsewhere. Minimal postoperative changes of the right lung apex is noted medially. There is minimal probable dependent atelectasis of the posterior inferior lung bases. There are no other significant infiltrates, lymphadenopathy, pleural or pericardial effusions, or incidental findings of concern identified. The limited images of the upper abdomen are noncontributory. STABLE RIGHT LUNG NODULES. POSTOPERATIVE CHANGES OF THE RIGHT LUNG APEX MEDIALLY. PROBABLE MINIMAL BIBASILAR ATELECTASIS POSTERIORLY. OTHERWISE, NEGATIVE CHEST CT. IMPRESSION AND SUGGESTION:  Patient is at risk due to   · URI with physical mattress lung disease and mild exacerbation  · Pulmonary granuloma  · Thrush  · Smoking    Recommendations  · No antibiotic needed from pulmonary point of view is clear with no infiltrate and pro-calcitonin is negative  · Continue HIV medications  · Continue Diflucan  · Appreciate ID  · Change to oral prednisone total 5 days of treatment.          Electronically signed by Haresh Dubose MD,  FCCP   on 10/30/2018 at 10:29 AM

## 2018-10-31 ENCOUNTER — TELEPHONE (OUTPATIENT)
Dept: PRIMARY CARE CLINIC | Age: 33
End: 2018-10-31

## 2018-10-31 ENCOUNTER — NURSE ONLY (OUTPATIENT)
Dept: INFECTIOUS DISEASES | Age: 33
End: 2018-10-31

## 2018-10-31 NOTE — TELEPHONE ENCOUNTER
[facility] Transitions Initial Follow Up Call    Outreach made within 2 business days of discharge: Yes patient needs to call back, currently driving    Patient: Jorge Villela Patient : 1985   MRN: [id number]  Reason for Admission: There are no discharge diagnoses documented for the most recent discharge. Discharge Date: 10/30/18       Spoke with:     Discharge department/facility:     [facility] Interactive Patient Contact:  Was patient able to fill all prescriptions:   Was patient instructed to bring all medications to the follow-up visit:   Is patient taking all medications as directed in the discharge summary?    Does patient understand their discharge instructions:   Does patient have questions or concerns that need addressed prior to 7-14 day follow up office visit:     Scheduled appointment with PCP within 7-14 days    Follow Up  Future Appointments  Date Time Provider Courtney Lee   2018 8:00 AM [facility]   2018 2:15 PM Stacey Weldon DO [address] [place] [address] ([address] [place] [address])

## 2018-10-31 NOTE — PROGRESS NOTES
Esophageal thrush (HCC)    Polyarthritis with positive rheumatoid factor (HCC)    Notalgia    Odynophagia           ASSESSMENT and PLAN:    Newly diagnosed HIV with CD4<50  URI  Dysphagia and thrush - will treat as esophageal thrush. Per op report from EGD: Visualization of the esophagus demonstrated small and large ulcers seen in the distal esophagus, biopsies obtained from center and periphery of the ulcers r/o CMV and HSV. Tobacco use - stop. Emphysema secondary to this. Diflucan 200mg PO daily x 2 weeks  Change to Levaquin PO after EGD x 4 more days  Weekly Azithro 1200mg prophylaxis  Patient is currently on Prezcobix/truvada. My office to check into medications - plan to change to Triumeq  Bactrim prophylaxis homegoing. Echo done  CT chest done  EGD done  [facility] consult appreciated. Patient to see my office nurse after discharge for medication review.      Stacey Castro DO

## 2018-11-02 ENCOUNTER — CARE COORDINATION (OUTPATIENT)
Dept: CASE MANAGEMENT | Age: 33
End: 2018-11-02

## 2018-11-02 ENCOUNTER — TELEPHONE (OUTPATIENT)
Dept: INFECTIOUS DISEASES | Age: 33
End: 2018-11-02

## 2018-11-02 NOTE — TELEPHONE ENCOUNTER
[facility] Transitions Initial Follow Up Call    Outreach made within 2 business days of discharge: No    Patient: Kitty Malin Patient : 1985   MRN: [id number]  Reason for Admission: There are no discharge diagnoses documented for the most recent discharge. Discharge Date: 10/30/18       Spoke with:     Discharge department/facility:     [facility] Interactive Patient Contact:  Was patient able to fill all prescriptions:   Was patient instructed to bring all medications to the follow-up visit:   Is patient taking all medications as directed in the discharge summary?    Does patient understand their discharge instructions:   Does patient have questions or concerns that need addressed prior to 7-14 day follow up office visit:     Scheduled appointment with PCP within 7-14 days    Follow Up  Future Appointments  Date Time Provider Courtney Lee   2018 8:00 AM MD [facility] Ch[facility]   2018 2:15 PM Stacey Jasso, DO [address] [place] [address]

## 2018-11-05 ENCOUNTER — TELEPHONE (OUTPATIENT)
Dept: INFECTIOUS DISEASES | Age: 33
End: 2018-11-05

## 2018-11-05 NOTE — TELEPHONE ENCOUNTER
Called to schedule pt for flu vac. Pt available tue 6th. He will also need to sign VIIV form for 2nd bottle of emergency mens as he has no ohdao or insurance at this time. States he is feeling good and denies problems at this time.

## 2018-11-13 ENCOUNTER — TELEPHONE (OUTPATIENT)
Dept: INFECTIOUS DISEASES | Age: 33
End: 2018-11-13

## 2018-11-13 ENCOUNTER — NURSE ONLY (OUTPATIENT)
Dept: INFECTIOUS DISEASES | Age: 33
End: 2018-11-13

## 2018-11-13 DIAGNOSIS — B20 AIDS (ACQUIRED IMMUNODEFICIENCY SYNDROME), CD4 <=200 (HCC): Primary | Chronic | ICD-10-CM

## 2018-11-13 NOTE — TELEPHONE ENCOUNTER
CM received notice of incomplete OHDAP application. Pt will need to provide pay stub from 10/10/18. CM phoned pt, informed him of same, and encouraged obtaining medical coverage through [place]. CM will follow up with pt as needed.

## 2018-11-14 ENCOUNTER — TELEPHONE (OUTPATIENT)
Dept: INFECTIOUS DISEASES | Age: 33
End: 2018-11-14

## 2018-11-14 NOTE — TELEPHONE ENCOUNTER
Per request, pt presented in office, provided additional income verification for [facility] eligibility determination. CM will fax to [facility] ([facility]). CM will follow up with pt as needed.

## 2018-12-07 ENCOUNTER — TELEPHONE (OUTPATIENT)
Dept: INFECTIOUS DISEASES | Age: 33
End: 2018-12-07

## 2018-12-07 DIAGNOSIS — B20 AIDS (ACQUIRED IMMUNODEFICIENCY SYNDROME), CD4 <=200 (HCC): Chronic | ICD-10-CM

## 2018-12-07 LAB
ALBUMIN SERPL-MCNC: 4.4 G/DL (ref 3.9–4.9)
ALP BLD-CCNC: 49 U/L (ref 35–104)
ALT SERPL-CCNC: 39 U/L (ref 0–41)
ANION GAP SERPL CALCULATED.3IONS-SCNC: 13 MEQ/L (ref 7–13)
AST SERPL-CCNC: 31 U/L (ref 0–40)
BILIRUB SERPL-MCNC: <0.2 MG/DL (ref 0–1.2)
BUN BLDV-MCNC: 9 MG/DL (ref 6–20)
CALCIUM SERPL-MCNC: 8.9 MG/DL (ref 8.6–10.2)
CHLORIDE BLD-SCNC: 102 MEQ/L (ref 98–107)
CO2: 26 MEQ/L (ref 22–29)
CREAT SERPL-MCNC: 0.98 MG/DL (ref 0.7–1.2)
GFR AFRICAN AMERICAN: >60
GFR NON-AFRICAN AMERICAN: >60
GLOBULIN: 2.7 G/DL (ref 2.3–3.5)
GLUCOSE BLD-MCNC: 97 MG/DL (ref 74–109)
HCT VFR BLD CALC: 41.5 % (ref 42–52)
HEMOGLOBIN: 14.5 G/DL (ref 14–18)
MCH RBC QN AUTO: 33.7 PG (ref 27–31.3)
MCHC RBC AUTO-ENTMCNC: 35 % (ref 33–37)
MCV RBC AUTO: 96.2 FL (ref 80–100)
PDW BLD-RTO: 17.7 % (ref 11.5–14.5)
PLATELET # BLD: 183 K/UL (ref 130–400)
POTASSIUM SERPL-SCNC: 4 MEQ/L (ref 3.5–5.1)
RBC # BLD: 4.31 M/UL (ref 4.7–6.1)
SODIUM BLD-SCNC: 141 MEQ/L (ref 132–144)
TOTAL PROTEIN: 7.1 G/DL (ref 6.4–8.1)
WBC # BLD: 5.1 K/UL (ref 4.8–10.8)

## 2018-12-11 LAB
ABSOLUTE CD 4 HELPER: 256 CELLS/UL (ref 430–1800)
CD4 % HELPER T CELL: 14 % (ref 32–64)
HIV-1 QNT LOG, IU/ML: <1.47 LOG CPY/ML
HIV-1 QNT, IU/ML: ABNORMAL CPY/ML
INTERPRETATION: DETECTED
LYMPHOCYTE SUBSET PANEL 2 INFO: ABNORMAL

## 2018-12-14 ENCOUNTER — OFFICE VISIT (OUTPATIENT)
Dept: INFECTIOUS DISEASES | Age: 33
End: 2018-12-14
Payer: COMMERCIAL

## 2018-12-14 ENCOUNTER — NURSE ONLY (OUTPATIENT)
Dept: INFECTIOUS DISEASES | Age: 33
End: 2018-12-14

## 2018-12-14 VITALS
BODY MASS INDEX: 21.54 KG/M2 | SYSTOLIC BLOOD PRESSURE: 133 MMHG | TEMPERATURE: 97.6 F | HEART RATE: 102 BPM | RESPIRATION RATE: 16 BRPM | DIASTOLIC BLOOD PRESSURE: 89 MMHG | HEIGHT: 72 IN | WEIGHT: 159 LBS

## 2018-12-14 DIAGNOSIS — F17.200 SMOKER: ICD-10-CM

## 2018-12-14 DIAGNOSIS — S02.5XXB OPEN FRACTURE OF TOOTH, INITIAL ENCOUNTER: ICD-10-CM

## 2018-12-14 DIAGNOSIS — J93.83 RECURRENT SPONTANEOUS PNEUMOTHORAX: ICD-10-CM

## 2018-12-14 DIAGNOSIS — B20 HIV (HUMAN IMMUNODEFICIENCY VIRUS INFECTION) (HCC): Primary | ICD-10-CM

## 2018-12-14 PROCEDURE — 99214 OFFICE O/P EST MOD 30 MIN: CPT | Performed by: INTERNAL MEDICINE

## 2018-12-14 ASSESSMENT — PATIENT HEALTH QUESTIONNAIRE - PHQ9
SUM OF ALL RESPONSES TO PHQ9 QUESTIONS 1 & 2: 2
SUM OF ALL RESPONSES TO PHQ QUESTIONS 1-9: 2
2. FEELING DOWN, DEPRESSED OR HOPELESS: 0
SUM OF ALL RESPONSES TO PHQ QUESTIONS 1-9: 2
1. LITTLE INTEREST OR PLEASURE IN DOING THINGS: 2

## 2018-12-16 NOTE — PROGRESS NOTES
to auscultation; dry cough. Abdomen: soft, ND, NTTP, +BS  Extrem:+pulses, no calf pain, no asymmetry of the upper or lower extremities, no current joint swelling, L dorsal hand slight discoloration over the area between thumb and first finger. Eczema? Neuro exam: CN II-XII intact  Thin build. Chemistry        Component Value Date/Time     12/07/2018 1348    K 4.0 12/07/2018 1348     12/07/2018 1348    CO2 26 12/07/2018 1348    BUN 9 12/07/2018 1348    CREATININE 0.98 12/07/2018 1348        Component Value Date/Time    CALCIUM 8.9 12/07/2018 1348    ALKPHOS 49 12/07/2018 1348    AST 31 12/07/2018 1348    ALT 39 12/07/2018 1348    BILITOT <0.2 12/07/2018 1348        Patient has been informed of the importance of protection during sexual encounters which include, but are not limited to vaginal intercourse, anal intercourse, and oral sex. Patient has also been made aware that it is a felony in the [address] to engage in a sexual encounter without first disclosing HIV status to partner.     Time spent with patient: 45 min    [address]

## 2018-12-17 ENCOUNTER — TELEPHONE (OUTPATIENT)
Dept: INFECTIOUS DISEASES | Age: 33
End: 2018-12-17

## 2018-12-17 NOTE — TELEPHONE ENCOUNTER
Call to clarify script. Spoke with [facility] pharmacist.   [facility] script. Pt is to call to register and set up delivery. Due to pt being a new client  Pt must call to set up and register. Call placed to pt to notify him he must call to register and set up delivery at least for the 1st delivery. [phone] number given to pt. VM left to call with questions. Requested he call today to set up delivery to avoid medication interruption.

## 2018-12-19 ENCOUNTER — NURSE ONLY (OUTPATIENT)
Dept: INFECTIOUS DISEASES | Age: 33
End: 2018-12-19

## 2018-12-27 ENCOUNTER — TELEPHONE (OUTPATIENT)
Dept: INFECTIOUS DISEASES | Age: 33
End: 2018-12-27

## 2018-12-28 ENCOUNTER — TELEPHONE (OUTPATIENT)
Dept: INFECTIOUS DISEASES | Age: 33
End: 2018-12-28

## 2019-01-04 ENCOUNTER — TELEPHONE (OUTPATIENT)
Dept: INFECTIOUS DISEASES | Age: 34
End: 2019-01-04

## 2019-01-07 ENCOUNTER — TELEPHONE (OUTPATIENT)
Dept: INFECTIOUS DISEASES | Age: 34
End: 2019-01-07

## 2019-01-31 ENCOUNTER — TELEPHONE (OUTPATIENT)
Dept: INFECTIOUS DISEASES | Age: 34
End: 2019-01-31

## 2019-03-01 ENCOUNTER — TELEPHONE (OUTPATIENT)
Dept: INFECTIOUS DISEASES | Age: 34
End: 2019-03-01

## 2019-03-11 ENCOUNTER — TELEPHONE (OUTPATIENT)
Dept: PRIMARY CARE CLINIC | Age: 34
End: 2019-03-11

## 2019-03-15 ENCOUNTER — TELEPHONE (OUTPATIENT)
Dept: INFECTIOUS DISEASES | Age: 34
End: 2019-03-15

## 2019-05-02 DIAGNOSIS — B20 AIDS (ACQUIRED IMMUNODEFICIENCY SYNDROME), CD4 <=200 (HCC): Primary | Chronic | ICD-10-CM

## 2019-05-08 ENCOUNTER — TELEPHONE (OUTPATIENT)
Dept: INFECTIOUS DISEASES | Age: 34
End: 2019-05-08

## 2019-05-21 ENCOUNTER — TELEPHONE (OUTPATIENT)
Dept: INFECTIOUS DISEASES | Age: 34
End: 2019-05-21

## 2019-05-21 NOTE — TELEPHONE ENCOUNTER
Pt called in needing to reschdule missed apt. He states he was out of town with family in [place]. He did mention he forgot his medications at home and missed about 1 week of meds. He has since then restarted and is tolerating well. Discussed compliance at length. Apt scheduled. Lab apt 5.22.19  Follow up 6.14.19   Pt is to bring in income verification. He is in need of RW updates. Discussed importance of this. He  verbalized all understanding.

## 2019-05-22 ENCOUNTER — OFFICE VISIT (OUTPATIENT)
Dept: INFECTIOUS DISEASES | Age: 34
End: 2019-05-22

## 2019-05-22 ENCOUNTER — NURSE ONLY (OUTPATIENT)
Dept: INFECTIOUS DISEASES | Age: 34
End: 2019-05-22

## 2019-05-22 DIAGNOSIS — B20 HUMAN IMMUNODEFICIENCY VIRUS (HCC): Primary | ICD-10-CM

## 2019-05-22 DIAGNOSIS — B20 AIDS (ACQUIRED IMMUNODEFICIENCY SYNDROME), CD4 <=200 (HCC): Chronic | ICD-10-CM

## 2019-05-22 LAB
ALBUMIN SERPL-MCNC: 4.2 G/DL (ref 3.5–4.6)
ALP BLD-CCNC: 49 U/L (ref 35–104)
ALT SERPL-CCNC: 21 U/L (ref 0–41)
ANION GAP SERPL CALCULATED.3IONS-SCNC: 16 MEQ/L (ref 9–15)
AST SERPL-CCNC: 22 U/L (ref 0–40)
BILIRUB SERPL-MCNC: 0.3 MG/DL (ref 0.2–0.7)
BUN BLDV-MCNC: 14 MG/DL (ref 6–20)
CALCIUM SERPL-MCNC: 9.3 MG/DL (ref 8.5–9.9)
CHLORIDE BLD-SCNC: 102 MEQ/L (ref 95–107)
CO2: 23 MEQ/L (ref 20–31)
CREAT SERPL-MCNC: 1 MG/DL (ref 0.7–1.2)
GFR AFRICAN AMERICAN: >60
GFR NON-AFRICAN AMERICAN: >60
GLOBULIN: 2.6 G/DL (ref 2.3–3.5)
GLUCOSE BLD-MCNC: 75 MG/DL (ref 70–99)
HCT VFR BLD CALC: 43.5 % (ref 42–52)
HEMOGLOBIN: 14.9 G/DL (ref 14–18)
HEPATITIS C ANTIBODY INTERPRETATION: NORMAL
MCH RBC QN AUTO: 33.9 PG (ref 27–31.3)
MCHC RBC AUTO-ENTMCNC: 34.2 % (ref 33–37)
MCV RBC AUTO: 99 FL (ref 80–100)
PDW BLD-RTO: 14.3 % (ref 11.5–14.5)
PLATELET # BLD: 161 K/UL (ref 130–400)
POTASSIUM SERPL-SCNC: 3.3 MEQ/L (ref 3.4–4.9)
RBC # BLD: 4.39 M/UL (ref 4.7–6.1)
SODIUM BLD-SCNC: 141 MEQ/L (ref 135–144)
TOTAL PROTEIN: 6.8 G/DL (ref 6.3–8)
WBC # BLD: 4.2 K/UL (ref 4.8–10.8)

## 2019-05-22 RX ORDER — ABACAVIR SULFATE, DOLUTEGRAVIR SODIUM, LAMIVUDINE 600; 50; 300 MG/1; MG/1; MG/1
TABLET, FILM COATED ORAL
COMMUNITY
Start: 2019-02-25 | End: 2022-06-28 | Stop reason: SDUPTHER

## 2019-05-22 NOTE — PROGRESS NOTES
Re-Assessment      Patient ID:   Ankit Martel  Date:   5/22/2019      Client ID:  [id number]    [address]   [address] [place], [place] [address] [phone] (home)     Family/House:    [] Partner  [] Children  [] Other -     Mental Health:   NA    Substance Abuse:   Smokes cigarettes  Social History     Socioeconomic History    Marital status: Single     Spouse name: Not on file    Number of children: Not on file    Years of education: Not on file    Highest education level: Not on file   Occupational History    Not on file   Social Needs    Financial resource strain: Not on file    Food insecurity:     Worry: Not on file     Inability: Not on file    Transportation needs:     Medical: Not on file     Non-medical: Not on file   Tobacco Use    Smoking status: Current Every Day Smoker     Packs/day: 0.25     Types: Cigarettes    Smokeless tobacco: Never Used    Tobacco comment: cut down to 4 cig. /day   Substance and Sexual Activity    Alcohol use: No    Drug use: No    Sexual activity: Yes     Partners: Male   Lifestyle    Physical activity:     Days per week: Not on file     Minutes per session: Not on file    Stress: Not on file   Relationships    Social connections:     Talks on phone: Not on file     Gets together: Not on file     Attends Restoration service: Not on file     Active member of club or organization: Not on file     Attends meetings of clubs or organizations: Not on file     Relationship status: Not on file    Intimate partner violence:     Fear of current or ex partner: Not on file     Emotionally abused: Not on file     Physically abused: Not on file     Forced sexual activity: Not on file   Other Topics Concern    Not on file   Social History Narrative    Not on file       Housing:   with family    Health/Healthcare:  Non-Compliant   Physician Visit:  Dr. Dion Harrison- next visit 6/19   Dental Visit:  CM encouraged follow up    CD4:   Lab Results   Component Value Date    LABABSO 256

## 2019-05-22 NOTE — PROGRESS NOTES
RN Intake      Patient ID:   Yovani Perera  Date:   5/22/2019      Client ID:      [address] [place] [address]  [phone] (home)   [x] OK to leave voicemail     Family/House:    [] Partner  [] Children  [x] Other -     Housing:   home    Transport:  car    Employment:  [employer] currently     Mental Health:   Denies any needs at this time    Substance Abuse:     Social History     Socioeconomic History    Marital status: Single     Spouse name: Not on file    Number of children: Not on file    Years of education: Not on file    Highest education level: Not on file   Occupational History    Not on file   Social Needs    Financial resource strain: Not on file    Food insecurity:     Worry: Not on file     Inability: Not on file    Transportation needs:     Medical: Not on file     Non-medical: Not on file   Tobacco Use    Smoking status: Current Every Day Smoker     Packs/day: 0.25     Types: Cigarettes    Smokeless tobacco: Never Used    Tobacco comment: cut down to 4 cig. /day   Substance and Sexual Activity    Alcohol use: No    Drug use: No    Sexual activity: Yes     Partners: Male   Lifestyle    Physical activity:     Days per week: Not on file     Minutes per session: Not on file    Stress: Not on file   Relationships    Social connections:     Talks on phone: Not on file     Gets together: Not on file     Attends Baptism service: Not on file     Active member of club or organization: Not on file     Attends meetings of clubs or organizations: Not on file     Relationship status: Not on file    Intimate partner violence:     Fear of current or ex partner: Not on file     Emotionally abused: Not on file     Physically abused: Not on file     Forced sexual activity: Not on file   Other Topics Concern    Not on file   Social History Narrative    Not on file   still smoking but trying to quit on his own  No etoh  Not othere drugs     Diagnosis:   Date of diagnosis: Transmition:  homosexual contact   Signs and symptoms:   [] Diarrhea  [] Night sweats   [] Dizziness  [] Chronic fatigue   [] Vomiting  [] Nausea   [] Hand/feet pain [] Headaches   [] Swollen lymph [] Skin rash   [] Fevers  [] Body aches   [] Weight loss  [] Other - denies all  Opportunistic Infections:     [] Thrush -   [] Wasting   [] KS   [] PCP   [] CMV   [] STD -  na  Health/Healthcare:     PCP Visit:  Elaina Valenzuela? ID Visit:  Follows with Dr Tavares Sánchez. Dental Visit:  Tried to go to [facility]. Problem occurred. He is requesting to waiting until insurance is obtained and will find provider (dental) through insurance. Last lab work:  As below. Pt here today for updated labs    CD4:   Lab Results   Component Value Date    LABABSO 256 12/07/2018       Viral Load:  Lab Results   Component Value Date    MGZ2ZSBLZCC 160,000 10/17/2018    AZK2KODXNV <1.47 12/07/2018       Medical Insurance:  Payor: JUSTO Rubio [id number] / Plan: 2729A y 65 [address] 82 S / Product Type: *No Product type* /    OHDAP/HIPSCA:     Renewal Date:    Pt lost [employer]. Met with [facility] today to try to reinstate. Labs from todays visit will be billed to [facility] as last resort. Allergies:  is allergic to tramadol; codeine; ketorolac; pcn [penicillins]; and toradol [ketorolac tromethamine].   No changes  Current Medications:  Current Outpatient Medications on File Prior to Visit   Medication Sig Dispense Refill    vitamin B-1 100 MG tablet Take 1 tablet by mouth daily 30 tablet 3    vitamin D (CHOLECALCIFEROL) 1000 UNIT TABS tablet Take 1,000 Units by mouth daily      folic acid-pyridoxine-cyanocobalamine (FOLTX) 2.5-25-1 MG TABS tablet Take 1 tablet by mouth daily      Tiotropium Bromide-Olodaterol (STIOLTO RESPIMAT) 2.5-2.5 MCG/ACT AERS Inhale into the lungs      albuterol (PROVENTIL) (2.5 MG/3ML) 0.083% nebulizer solution use 1 (ONE) vial (3 (THREE) mls) via NEBULIZER EVERY FOUR HOURS AS NEEDED for FOR WHEEZING  0    VENTOLIN  (90 BASE)

## 2019-05-23 LAB — RPR: NORMAL

## 2019-05-24 LAB
ABSOLUTE CD 4 HELPER: 190 CELLS/UL (ref 430–1800)
CD4 % HELPER T CELL: 18 % (ref 32–64)
LYMPHOCYTE SUBSET PANEL 2 INFO: ABNORMAL

## 2019-05-25 LAB
HIV-1 QNT LOG, IU/ML: 3 LOG CPY/ML
HIV-1 QNT, IU/ML: 991 CPY/ML
INTERPRETATION: DETECTED
QUANTI TB GOLD PLUS: NEGATIVE
QUANTI TB1 MINUS NIL: 0 IU/ML (ref 0–0.34)
QUANTI TB2 MINUS NIL: 0 IU/ML (ref 0–0.34)
QUANTIFERON MITOGEN: 9.5 IU/ML
QUANTIFERON NIL: 0.02 IU/ML

## 2019-06-14 ENCOUNTER — NURSE ONLY (OUTPATIENT)
Dept: INFECTIOUS DISEASES | Age: 34
End: 2019-06-14

## 2019-06-14 ENCOUNTER — OFFICE VISIT (OUTPATIENT)
Dept: INFECTIOUS DISEASES | Age: 34
End: 2019-06-14
Payer: COMMERCIAL

## 2019-06-14 VITALS
TEMPERATURE: 97.5 F | DIASTOLIC BLOOD PRESSURE: 91 MMHG | WEIGHT: 164 LBS | HEIGHT: 72 IN | SYSTOLIC BLOOD PRESSURE: 131 MMHG | BODY MASS INDEX: 22.21 KG/M2 | RESPIRATION RATE: 22 BRPM | HEART RATE: 91 BPM

## 2019-06-14 DIAGNOSIS — Z21 ASYMPTOMATIC HIV INFECTION (HCC): ICD-10-CM

## 2019-06-14 DIAGNOSIS — M71.122 SEPTIC OLECRANON BURSITIS OF LEFT ELBOW: Primary | ICD-10-CM

## 2019-06-14 PROCEDURE — 99214 OFFICE O/P EST MOD 30 MIN: CPT | Performed by: INTERNAL MEDICINE

## 2019-06-14 RX ORDER — DOXYCYCLINE HYCLATE 100 MG
100 TABLET ORAL 2 TIMES DAILY
Qty: 20 TABLET | Refills: 0 | Status: SHIPPED | OUTPATIENT
Start: 2019-06-14 | End: 2019-06-21 | Stop reason: SDUPTHER

## 2019-06-14 NOTE — PROGRESS NOTES
Pt presented for follow up with Dr. Bon Rodriguez. CM met with pt, provided socialization. Pt stated he has been taking meds as proscribed, CM provided support and encouragement. CM will follow up with pt as needed.

## 2019-06-14 NOTE — PROGRESS NOTES
Routine follow up. Reviewed labs---reviewed all past labs as well. Missed about 4-5 pills in 30 days. Discussed adherence. Alarm discussed pill carrier discussed. Partner here with pt. also + not on meds. Discussed resistance as well/   C/o of left elbow discomfort and swelling. He will discuss with Dr Birgit Rangel. It is warm to touch and swollen. Provided information regarding safe sex practices, PrEP, and U=U, undetectable equals un-transmittable. Explained the unlikelihood of HIV transmission if a pt has been undetectable (viral load). Safe sex practices are still encouraged to prevent transmission of other STI's, such as, chlamydia, herpes, GC. Discussed upcoming HIV support group. Pt is not interested at this time. Will think about it and update staff if interested in further groups. Denies questions and will call with concerns. Pt is to follow up in 1 week. Given script for doxy. (per Dr Birgit Rangel)   Discussed importance of follow up with pt along with reinforcing adherence.

## 2019-06-21 ENCOUNTER — OFFICE VISIT (OUTPATIENT)
Dept: INFECTIOUS DISEASES | Age: 34
End: 2019-06-21
Payer: COMMERCIAL

## 2019-06-21 VITALS
RESPIRATION RATE: 20 BRPM | SYSTOLIC BLOOD PRESSURE: 108 MMHG | HEIGHT: 73 IN | HEART RATE: 86 BPM | BODY MASS INDEX: 22.53 KG/M2 | TEMPERATURE: 98.4 F | DIASTOLIC BLOOD PRESSURE: 64 MMHG | WEIGHT: 170 LBS

## 2019-06-21 DIAGNOSIS — M71.122 SEPTIC OLECRANON BURSITIS OF LEFT ELBOW: Primary | ICD-10-CM

## 2019-06-21 DIAGNOSIS — Z21 ASYMPTOMATIC HIV INFECTION (HCC): ICD-10-CM

## 2019-06-21 PROCEDURE — 99213 OFFICE O/P EST LOW 20 MIN: CPT | Performed by: INTERNAL MEDICINE

## 2019-06-21 RX ORDER — DOXYCYCLINE HYCLATE 100 MG
100 TABLET ORAL 2 TIMES DAILY
Qty: 20 TABLET | Refills: 0 | Status: SHIPPED | OUTPATIENT
Start: 2019-06-21 | End: 2019-07-01

## 2019-07-01 NOTE — PROGRESS NOTES
relationship - that person is on truvada [place] Signs)  Current partner - Grupo Lynch.      No other hx of STDs in the past.      Has been in alf a day or two.      Support system: mom and grandma and siblings - only the brother and mother know of his status. In an emergency contact [employer] or his mother. Both have full disclosure. In case Gr[employer] Lynch can't be reached then can talk to his mom.        Overall doing well. Has gained some weight. No complaints. Taking his medications mostly, except for the 4-5 pills he missed. Partner is HIV + but not on meds. +L elbow erythema and pain with swelling of the fat pad. Review of Systems:  Cold and congested   Chest hurts and cough and runny nose. Fever. X few days  Body aches. All 14 systems reviewed with patient and are negative other than as stated above. Medication History  Current Outpatient Medications   Medication Sig Dispense Refill    TRIUMEQ 600- MG TABS       vitamin B-1 100 MG tablet Take 1 tablet by mouth daily 30 tablet 3    vitamin D (CHOLECALCIFEROL) 1000 UNIT TABS tablet Take 1,000 Units by mouth daily      folic acid-pyridoxine-cyanocobalamine (FOLTX) 2.5-25-1 MG TABS tablet Take 1 tablet by mouth daily      Tiotropium Bromide-Olodaterol (STIOLTO RESPIMAT) 2.5-2.5 MCG/ACT AERS Inhale into the lungs      albuterol (PROVENTIL) (2.5 MG/3ML) 0.083% nebulizer solution use 1 (ONE) vial (3 (THREE) mls) via NEBULIZER EVERY FOUR HOURS AS NEEDED for FOR WHEEZING  0    VENTOLIN  (90 BASE) MCG/ACT inhaler inhale 1-2 puffs BY MOUTH EVERY FOUR HOURS AS NEEDED for FOR WHEEZING  0    SUMAtriptan (IMITREX) 100 MG tablet       lisdexamfetamine (VYVANSE) 70 MG capsule Take 70 mg by mouth daily      doxycycline hyclate (VIBRA-TABS) 100 MG tablet Take 1 tablet by mouth 2 times daily for 10 days 20 tablet 0     No current facility-administered medications for this visit.         Past Medical History  Past Medical History:   Diagnosis Date    ADHD  AIDS (acquired immune deficiency syndrome) (HCC)     Anemia     Asthma     Chronic back pain     COPD (chronic obstructive pulmonary disease) ([facility])     Ischemic heart disease     Lung disease     Pneumothorax July 17, 2014    right side    Smoker     Vitamin D deficiency        Past Surgical History  Past Surgical History:   Procedure Laterality Date    [facility] EGD TRANSORAL BIOPSY SINGLE/MULTIPLE N/A 10/30/2018    EGD performed by María Franz MD at [facility]  7/17/14    Right VATS with stapling of apical blebs and parietal pleurectomy       Allergies  Allergies   Allergen Reactions    Tramadol Other (See Comments)     Does not remember what the reaction was to tramadol    Codeine Itching    Ketorolac Rash    Pcn [Penicillins] Nausea Only     PCN    Toradol [Ketorolac Tromethamine] Nausea Only       Family History  Family History   Problem Relation Age of Onset    Hypertension Mother        Immunization History  Immunization History   Administered Date(s) Administered    Influenza, Quadv, 6 mo and older, IM (Flulaval) 11/13/2018    Pneumococcal Polysaccharide ([id number]) 04/06/2009       Physical Exam    BP (!) 131/91   Pulse 91   Temp 97.5 °F (36.4 °C) (Oral)   Resp 22   Ht 6' (1.829 m)   Wt 164 lb (74.4 kg)   BMI 22.24 kg/m²     General: Patient appears in no acute distress, cooperative, looks much better  Skin: no new rashes or erythema or induration  HEENT: EOMI, MMM, Neck is supple  Poor dentition  Heart: S1 S2  Lungs: bilaterally clear to auscultation; dry cough. Abdomen: soft, ND, NTTP, +BS  Extrem: L elbow fat pad erythematous and swollen  Neuro exam: CN II-XII intact  Thin build.            Chemistry        Component Value Date/Time     05/22/2019 1442    K 3.3 (L) 05/22/2019 1442     05/22/2019 1442    CO2 23 05/22/2019 1442    BUN 14 05/22/2019 1442    CREATININE 1.00 05/22/2019 1442        Component Value Date/Time    CALCIUM 9.3 05/22/2019 1442 ALKPHOS 49 05/22/2019 1442    AST 22 05/22/2019 1442    ALT 21 05/22/2019 1442    BILITOT 0.3 05/22/2019 1442        Patient has been informed of the importance of protection during sexual encounters which include, but are not limited to vaginal intercourse, anal intercourse, and oral sex. Patient has also been made aware that it is a felony in the [address] to engage in a sexual encounter without first disclosing HIV status to partner. Assessment/Plan:  HIV infection  Septic olecranon bursitis L elbow  - PO Doxycycline and follow up in 1 week  Hx of ischemic heart disease  Recurrent pneumothorax history with blebs   Persistent back pain and positive rheumatoid factor  Hx of broken teeth/poor dentition    Pneumonia vaccine to hold until his counts improve. Check last date of TDAP.        Time spent with patient: 25 min    [address]

## 2019-07-29 NOTE — PROGRESS NOTES
that person is on tr[address] [place])  Current partner - Nghia Gerber.      No other hx of STDs in the past.      Has been in [facility] a day or two.      Support system: mom and grandma and siblings - only the brother and mother know of his status. In an emergency contact Nghia Gerber or his mother. Both have full disclosure. In case Nghia Gerber can't be reached then can talk to his mom.        +L elbow erythema and pain with swelling of the fat pad. This is a follow up regarding this. Overall better though still slightly swollena nd warm      All 14 systems reviewed with patient and are negative other than as stated above. Medication History  Current Outpatient Medications   Medication Sig Dispense Refill    TRIUMEQ 600- MG TABS       vitamin B-1 100 MG tablet Take 1 tablet by mouth daily 30 tablet 3    vitamin D (CHOLECALCIFEROL) 1000 UNIT TABS tablet Take 1,000 Units by mouth daily      folic acid-pyridoxine-cyanocobalamine (FOLTX) 2.5-25-1 MG TABS tablet Take 1 tablet by mouth daily      Tiotropium Bromide-Olodaterol (STIOLTO RESPIMAT) 2.5-2.5 MCG/ACT AERS Inhale into the lungs      albuterol (PROVENTIL) (2.5 MG/3ML) 0.083% nebulizer solution use 1 (ONE) vial (3 (THREE) mls) via NEBULIZER EVERY FOUR HOURS AS NEEDED for FOR WHEEZING  0    VENTOLIN  (90 BASE) MCG/ACT inhaler inhale 1-2 puffs BY MOUTH EVERY FOUR HOURS AS NEEDED for FOR WHEEZING  0    SUMAtriptan (IMITREX) 100 MG tablet       lisdexamfetamine (VYVANSE) 70 MG capsule Take 70 mg by mouth daily       No current facility-administered medications for this visit.         Past Medical History  Past Medical History:   Diagnosis Date    ADHD     AIDS (acquired immune deficiency syndrome) ([facility] [address]     Anemia     Asthma     Chronic back pain     COPD (chronic obstructive pulmonary disease) (HCC)     Ischemic heart disease     Lung disease     Pneumothorax July 17, 2014    right side    Smoker     Vitamin D deficiency        Past Surgical History  Past Surgical History:   Procedure Laterality Date    [place] EGD TRANSORAL BIOPSY SINGLE/MULTIPLE N/A 10/30/2018    [facility]    Right VATS with stapling of apical blebs and parietal pleurectomy       Allergies  Allergies   Allergen Reactions    Tramadol Other (See Comments)     Does not remember what the reaction was to tramadol    Codeine Itching    Ketorolac Rash    Pcn [Penicillins] Nausea Only     PCN    Toradol [Ketorolac Tromethamine] Nausea Only       Family History  Family History   Problem Relation Age of Onset    Hypertension Mother        Immunization History  Immunization History   Administered Date(s) Administered    Influenza, Quadv, 6 mo and older, IM (Flulaval) 11/13/2018    Pneumococcal Polysaccharide ([id number]) 04/06/2009       Physical Exam    /64   Pulse 86   Temp 98.4 °F (36.9 °C) (Oral)   Resp 20   Ht 6' 1\" (1.854 m)   Wt 170 lb (77.1 kg)   BMI 22.43 kg/m²     General: Patient appears in no acute distress, cooperative  Skin: L elbow still swollen and warm but overall better. HEENT: EOMI, MMM, Neck is supple  Poor dentition  Heart: S1 S2  Lungs: bilaterally clear to auscultation; dry cough. Abdomen: soft, ND, NTTP, +BS  Extrem:as above. No pain in LEs  Neuro exam: CN II-XII intact  Thin build. Chemistry        Component Value Date/Time     05/22/2019 1442    K 3.3 (L) 05/22/2019 1442     05/22/2019 1442    CO2 23 05/22/2019 1442    BUN 14 05/22/2019 1442    CREATININE 1.00 05/22/2019 1442        Component Value Date/Time    CALCIUM 9.3 05/22/2019 1442    ALKPHOS 49 05/22/2019 1442    AST 22 05/22/2019 1442    ALT 21 05/22/2019 1442    BILITOT 0.3 05/22/2019 1442        Patient has been informed of the importance of protection during sexual encounters which include, but are not limited to vaginal intercourse, anal intercourse, and oral sex.   Patient has also been made aware that it is a

## 2019-11-14 ENCOUNTER — TELEPHONE (OUTPATIENT)
Dept: INFECTIOUS DISEASES | Age: 34
End: 2019-11-14

## 2019-11-14 DIAGNOSIS — B20 AIDS (ACQUIRED IMMUNODEFICIENCY SYNDROME), CD4 <=200 (HCC): Chronic | ICD-10-CM

## 2019-11-14 DIAGNOSIS — B20 AIDS (ACQUIRED IMMUNODEFICIENCY SYNDROME), CD4 <=200 (HCC): Primary | Chronic | ICD-10-CM

## 2019-11-14 LAB
ALBUMIN SERPL-MCNC: 4.4 G/DL (ref 3.5–4.6)
ALP BLD-CCNC: 51 U/L (ref 35–104)
ALT SERPL-CCNC: 15 U/L (ref 0–41)
ANION GAP SERPL CALCULATED.3IONS-SCNC: 10 MEQ/L (ref 9–15)
AST SERPL-CCNC: 15 U/L (ref 0–40)
BILIRUB SERPL-MCNC: 0.4 MG/DL (ref 0.2–0.7)
BUN BLDV-MCNC: 12 MG/DL (ref 6–20)
CALCIUM SERPL-MCNC: 9.5 MG/DL (ref 8.5–9.9)
CHLORIDE BLD-SCNC: 105 MEQ/L (ref 95–107)
CO2: 28 MEQ/L (ref 20–31)
CREAT SERPL-MCNC: 0.98 MG/DL (ref 0.7–1.2)
GFR AFRICAN AMERICAN: >60
GFR NON-AFRICAN AMERICAN: >60
GLOBULIN: 2.9 G/DL (ref 2.3–3.5)
GLUCOSE BLD-MCNC: 76 MG/DL (ref 70–99)
HCT VFR BLD CALC: 46.2 % (ref 42–52)
HEMOGLOBIN: 15 G/DL (ref 14–18)
MCH RBC QN AUTO: 31.6 PG (ref 27–31.3)
MCHC RBC AUTO-ENTMCNC: 32.5 % (ref 33–37)
MCV RBC AUTO: 97.3 FL (ref 80–100)
PDW BLD-RTO: 14.1 % (ref 11.5–14.5)
PLATELET # BLD: 192 K/UL (ref 130–400)
POTASSIUM SERPL-SCNC: 4.3 MEQ/L (ref 3.4–4.9)
RBC # BLD: 4.75 M/UL (ref 4.7–6.1)
SODIUM BLD-SCNC: 143 MEQ/L (ref 135–144)
TOTAL PROTEIN: 7.3 G/DL (ref 6.3–8)
WBC # BLD: 3.8 K/UL (ref 4.8–10.8)

## 2019-11-15 LAB
ABSOLUTE CD 4 HELPER: 348 CELLS/UL (ref 430–1800)
CD4 % HELPER T CELL: 25 % (ref 32–64)
LYMPHOCYTE SUBSET PANEL 2 INFO: ABNORMAL

## 2019-11-17 LAB
HIV-1 QNT LOG, IU/ML: 2.21 LOG CPY/ML
HIV-1 QNT, IU/ML: 161 CPY/ML
INTERPRETATION: DETECTED

## 2019-11-18 ENCOUNTER — NURSE ONLY (OUTPATIENT)
Dept: INFECTIOUS DISEASES | Age: 34
End: 2019-11-18

## 2019-11-19 LAB
C. TRACHOMATIS DNA ,URINE: POSITIVE
N. GONORRHOEAE DNA, URINE: NEGATIVE

## 2019-12-04 DIAGNOSIS — B20 AIDS (ACQUIRED IMMUNODEFICIENCY SYNDROME), CD4 <=200 (HCC): Primary | Chronic | ICD-10-CM

## 2019-12-18 ENCOUNTER — TELEPHONE (OUTPATIENT)
Dept: INFECTIOUS DISEASES | Age: 34
End: 2019-12-18

## 2019-12-19 ENCOUNTER — TELEPHONE (OUTPATIENT)
Dept: INFECTIOUS DISEASES | Age: 34
End: 2019-12-19

## 2019-12-20 ENCOUNTER — TELEPHONE (OUTPATIENT)
Dept: INFECTIOUS DISEASES | Age: 34
End: 2019-12-20

## 2020-01-13 ENCOUNTER — TELEPHONE (OUTPATIENT)
Dept: INFECTIOUS DISEASES | Age: 35
End: 2020-01-13

## 2020-01-29 ENCOUNTER — OFFICE VISIT (OUTPATIENT)
Dept: INFECTIOUS DISEASES | Age: 35
End: 2020-01-29
Payer: COMMERCIAL

## 2020-01-29 VITALS
HEIGHT: 73 IN | HEART RATE: 65 BPM | TEMPERATURE: 97.9 F | SYSTOLIC BLOOD PRESSURE: 130 MMHG | BODY MASS INDEX: 23.33 KG/M2 | DIASTOLIC BLOOD PRESSURE: 87 MMHG | WEIGHT: 176 LBS | RESPIRATION RATE: 20 BRPM

## 2020-01-29 PROCEDURE — 99214 OFFICE O/P EST MOD 30 MIN: CPT | Performed by: INTERNAL MEDICINE

## 2020-01-29 PROCEDURE — 90471 IMMUNIZATION ADMIN: CPT | Performed by: INTERNAL MEDICINE

## 2020-01-29 PROCEDURE — 90686 IIV4 VACC NO PRSV 0.5 ML IM: CPT | Performed by: INTERNAL MEDICINE

## 2020-01-29 NOTE — PROGRESS NOTES
Abby Barahona, or parent/legal guardian of  Anabel Carias and verbalized understanding. Patient responses:    Have you ever had a reaction to a flu vaccine? No  Are you able to eat eggs without adverse effects? Yes  Do you have any current illness? No  Have you ever had [place] Syndrome? No    Flu vaccine given per order. Please see immunization tab  After obtaining consent, and per orders of Dr. Shakira Granados, injection of  0.5  m.l   FLU VACCINE  Was administered to left deltoid. Viet Bonilla  Lot #: [id number]  EXP: June 30, 2020    Patient tolerated well. Denies pain at site. Patient instructed to remain in clinic for 20 minutes afterwards, and to report any adverse reaction to me immediately. Missed 1 day of medication in the past 30 days/ again discussed compliance. He is requesting help with basic needs. Requesting food/gas voucher  Needs assessed. 1 food and 1 gas card given to pt. Denies other needs at this time and will call with concerns and/or updates      Denies any needs at this time. Denies any issues that need Doctor attention. Will call with concerns        Updates. No changes to insurance. No changes to income. Will provide when eligibility is updated.    HIV primary care-- hospital out pt  Housing-- stable  HIV risk counseling--- yes  Mental health-- yes  Substance abuse--- yes      Support group--- not at this time

## 2020-01-29 NOTE — PROGRESS NOTES
Patient Name: Duke Alonso  YOB: 1985  Patient Sex: male  Medical Record Number: [id number]    Background:  Patient with HIV + MSM with a longterm HIV+ partner since 2006. Partner's regimen is unknown. Last prior negative HIV blood test was in 2009, although he says he got a mouth swab in 2016 and was told it was negative. Presented to my office with mario CD4 <50, 8%, viral load is 160,000. G6PD negative, RPR negative, HLA negative, serum quantiferon negative, chlam/gonorrhea negative, Hepatitis negative. Genotype with resistance to stavudine and zidovudine, otherwise negative      Presenting with + thrush, including possible esophageal thrush, overall malaise, URI with SOB. On chart review dating back to 2014, I am seeing an abnormal Echo with \"ischemia\" noted althought he EF noted to be normal at the time. I am also seeing recurrent episodes of pneumothorax and blebs with a RML lung nodule <1cm. He has a hx of VATS as well. There is also a positive rheumatoid factor from 2014 that was never followed. Patient has complaints of pains in his hands and down his spine for years with hx of what seems to be reynauds by description. He also tells me that he has been on the same O2nc at home at night ( albeit noncompliant ) for the past year.      + weight loss >30lbs over 8 months.   + cigarette use, no illicit drugs  No alcohol. Has been in pain management in the past with Dr Cassandra Baxter due to persistent back pain from neck to lumbar region, which he thought was from sleeping on the couch, but seems to be persistent.      Has had a persistent L dorsal hand skin rash      ~20 partners over the course of his lifetime, all male. 1 female initially. No TB exposure that he knows of. Likes to travel - has been to [place] this past February and [place] in the past, [place] as a teenager.      + dogs at home. Works as a manager at [employer].    Has another partner outside of the relationship - that ([facility] [address]     Ischemic heart disease     Lung disease     Pneumothorax July 17, 2014    right side    Smoker     Vitamin D deficiency        Past Surgical History  Past Surgical History:   Procedure Laterality Date    [place] EGD TRANSORAL BIOPSY SINGLE/MULTIPLE N/A 10/30/2018    EGD performed by Marisabel Pastor MD at [facility]  7/17/14    Right VATS with stapling of apical blebs and parietal pleurectomy       Allergies  Allergies   Allergen Reactions    Tramadol Other (See Comments)     Does not remember what the reaction was to tramadol    Codeine Itching    Ketorolac Rash    Pcn [Penicillins] Nausea Only     PCN    Toradol [Ketorolac Tromethamine] Nausea Only       Family History  Family History   Problem Relation Age of Onset    Hypertension Mother        Immunization History  Immunization History   Administered Date(s) Administered    Influenza, Quadv, 6 mo and older, IM (Fluzone, Flulaval) 11/13/2018    Pneumococcal Polysaccharide ([id number]) 04/06/2009       Physical Exam    General: Patient appears in no acute distress, cooperative  No rashes  HEENT: EOMI, MMM, Neck is supple  Poor dentition  Heart: S1 S2  Lungs: bilaterally clear to auscultation; dry cough. Abdomen: soft, ND, NTTP, +BS  ExtremNo pain in LEs, elbows are ok  Neuro exam: CN II-XII intact  Thin build. Chemistry        Component Value Date/Time     11/14/2019 1150    K 4.3 11/14/2019 1150     11/14/2019 1150    CO2 28 11/14/2019 1150    BUN 12 11/14/2019 1150    CREATININE 0.98 11/14/2019 1150        Component Value Date/Time    CALCIUM 9.5 11/14/2019 1150    ALKPHOS 51 11/14/2019 1150    AST 15 11/14/2019 1150    ALT 15 11/14/2019 1150    BILITOT 0.4 11/14/2019 1150        Patient has been informed of the importance of protection during sexual encounters which include, but are not limited to vaginal intercourse, anal intercourse, and oral sex.   Patient has also been made aware that it is a felony in the [address] to engage in a sexual encounter without first disclosing HIV status to partner. Assessment/Plan:  Chlamydia -  needs treatment and rechecked from 11/2019.  Discussed options for treatment    HIV infection  Hx of ischemic heart disease  Recurrent pneumothorax history with blebs   Persistent back pain ( ok at present time )  and positive rheumatoid factor  Hx of broken teeth/poor dentition    Pneumonia vaccine/ TDAP    Time spent with patient: 25 min    Stacey Castro

## 2020-01-31 ENCOUNTER — TELEPHONE (OUTPATIENT)
Dept: INFECTIOUS DISEASES | Age: 35
End: 2020-01-31

## 2020-02-12 ENCOUNTER — NURSE ONLY (OUTPATIENT)
Dept: INFECTIOUS DISEASES | Age: 35
End: 2020-02-12

## 2020-02-12 DIAGNOSIS — B20 AIDS (ACQUIRED IMMUNODEFICIENCY SYNDROME), CD4 <=200 (HCC): Chronic | ICD-10-CM

## 2020-02-12 DIAGNOSIS — Z20.2 STD EXPOSURE: ICD-10-CM

## 2020-02-12 LAB
CHOLESTEROL, TOTAL: 161 MG/DL (ref 0–199)
HDLC SERPL-MCNC: 51 MG/DL (ref 40–59)
LDL CHOLESTEROL CALCULATED: 98 MG/DL (ref 0–129)
TRIGL SERPL-MCNC: 61 MG/DL (ref 0–150)

## 2020-02-12 NOTE — PROGRESS NOTES
Presents today for STD re check. After labs completed pt then states he had not taken the 1200 mg zithro that was prescribed at his last visit. Stated he lost it. This medication was recalled to pt pharmacy. Importance of this medication and tx of STD was discussed at extreme length. States that partner is aware.        Will update Dr Consuelo Deng

## 2020-02-14 LAB — RPR: NORMAL

## 2020-02-17 LAB
C. TRACHOMATIS DNA ,URINE: POSITIVE
N. GONORRHOEAE DNA, URINE: NEGATIVE

## 2020-02-19 ENCOUNTER — NURSE ONLY (OUTPATIENT)
Dept: INFECTIOUS DISEASES | Age: 35
End: 2020-02-19

## 2020-02-19 DIAGNOSIS — B20 HIV INFECTION, UNSPECIFIED SYMPTOM STATUS (HCC): ICD-10-CM

## 2020-02-19 NOTE — PROGRESS NOTES
Here today for HDN visit. PATIENT WAS IN THE CLINIC TODAY AND RECEIVED THE FOLLOWING VACCINES Pnemo 23 and Menactra # 2 FROM THE [address] [place] [address] NURSE:      THIS NOTE IS SOLELY FOR DOCUMENTATION PURPOSES AND THERE IS NO CHARGE. THESE VACCINES WERE PAID FOR BY EITHER THE STATE OR THE PATIENT'S INSURANCE PLAN. PATIENT WAS INSTRUCTED TO WAIT 20 MINUTES AFTERWARDS, AND REPORT ANY ADVERSE REACTIONS, IMMEDIATELY. Doing ok. Took zithro last Thursday. Dr Tremayne Araujo aware of delay in tx due pt loosing medication. Spoke with Dr Tremayne Araujo-- ok for pt to re due G/C today and follow up next week 2.26.2020. Discussed with pt. This works for his as his partner is in the hospital.   Discussed importance of this follow up too. Reviewed follow up orders from Dr Tremayne Araujo-- he has not followed up. All [facility] and [facility] information reviewed and given to pt again. He states he will call today and call office with update      He is requesting help with basic needs. Requesting food/gas voucher  Needs assessed. 2 gas cards given to pt. Denies other needs at this time and will call with concerns and/or updates      Denies any needs at this time. Denies any issues that need Doctor attention.  Will call with concerns

## 2020-02-24 LAB
C. TRACHOMATIS DNA ,URINE: POSITIVE
N. GONORRHOEAE DNA, URINE: NEGATIVE

## 2020-02-26 ENCOUNTER — OFFICE VISIT (OUTPATIENT)
Dept: INFECTIOUS DISEASES | Age: 35
End: 2020-02-26
Payer: COMMERCIAL

## 2020-02-26 VITALS
HEIGHT: 73 IN | RESPIRATION RATE: 20 BRPM | BODY MASS INDEX: 22.66 KG/M2 | DIASTOLIC BLOOD PRESSURE: 73 MMHG | WEIGHT: 171 LBS | SYSTOLIC BLOOD PRESSURE: 116 MMHG | TEMPERATURE: 97.6 F | HEART RATE: 78 BPM

## 2020-02-26 DIAGNOSIS — Z20.2 STD EXPOSURE: ICD-10-CM

## 2020-02-26 DIAGNOSIS — A74.9 CHLAMYDIA: ICD-10-CM

## 2020-02-26 PROCEDURE — 99214 OFFICE O/P EST MOD 30 MIN: CPT | Performed by: INTERNAL MEDICINE

## 2020-02-26 NOTE — PROGRESS NOTES
Patient Name: Ada Rao  YOB: 1985  Patient Sex: male  Medical Record Number: [id number]    Background:  Patient with HIV + MSM with a longterm HIV+ partner since 2006. Partner's regimen is unknown. Last prior negative HIV blood test was in 2009, although he says he got a mouth swab in 2016 and was told it was negative. Presented to my office with mario CD4 <50, 8%, viral load is 160,000. G6PD negative, RPR negative, HLA negative, serum quantiferon negative, chlam/gonorrhea negative, Hepatitis negative. Genotype with resistance to stavudine and zidovudine, otherwise negative      Presented with + thrush, including possible esophageal thrush, overall malaise, URI with SOB. On chart review dating back to 2014, had an abnormal Echo with \"ischemia\" noted althought he EF noted to be normal at the time. + hx of recurrent pneumothorax and blebs with a RML lung nodule <1cm. He has a hx of VATS as well. There is also a positive rheumatoid factor from 2014 that was never followed. + hx of reynauds and rash on dorsal hand L hand.    + weight loss >30lbs over 8 months.   + cigarette use, no illicit drugs  No alcohol. Has been in pain management in the past with Dr Letta Dakin due to persistent back pain from neck to lumbar region, which he thought was from sleeping on the couch, but seems to be persistent.    ~20 partners over the course of his lifetime, all male. 1 female initially. No TB exposure that he knows of. Likes to travel - has been to [place] and [place] in the [place], [facility] as a teenager. + dogs at home. Works as a manager at [employer]. Has another partner outside of the relationship - that person is on truvada [address])  Current partner - Ashlee Downer. No other hx of STDs in the past.   Has been in care home a day or two.      Support system: mom and grandma and siblings - only the brother and mother know of his status. In an emergency contact Ashlee Fernandez or his mother.  Both have full disclosure. In case Clementina Miquel can't be reached then can talk to his mom.     Labs from 11/2019 reviewed - + chlamydia, negative GC. Was treated for this but did not tolerate the treatment. Was retreated and is here for a follow up Hi urine was still positive for chlamydia after treatment as of 2/19/2020    Presents as a follow up. Working with [facility]. Needs a repeat GC test.     All 14 systems reviewed with patient and are negative other than as stated above. Medication History  Current Outpatient Medications   Medication Sig Dispense Refill    TRIUMEQ 600- MG TABS       vitamin B-1 100 MG tablet Take 1 tablet by mouth daily 30 tablet 3    vitamin D (CHOLECALCIFEROL) 1000 UNIT TABS tablet Take 1,000 Units by mouth daily      folic acid-pyridoxine-cyanocobalamine (FOLTX) 2.5-25-1 MG TABS tablet Take 1 tablet by mouth daily      Tiotropium Bromide-Olodaterol (STIOLTO RESPIMAT) 2.5-2.5 MCG/ACT AERS Inhale into the lungs      albuterol (PROVENTIL) (2.5 MG/3ML) 0.083% nebulizer solution use 1 (ONE) vial (3 (THREE) mls) via NEBULIZER EVERY FOUR HOURS AS NEEDED for FOR WHEEZING  0    VENTOLIN  (90 BASE) MCG/ACT inhaler inhale 1-2 puffs BY MOUTH EVERY FOUR HOURS AS NEEDED for FOR WHEEZING  0    SUMAtriptan (IMITREX) 100 MG tablet       lisdexamfetamine (VYVANSE) 70 MG capsule Take 70 mg by mouth daily       No current facility-administered medications for this visit.         Past Medical History  Past Medical History:   Diagnosis Date    ADHD     AIDS (acquired immune deficiency syndrome) ([facility] [address]     Anemia     Asthma     Chronic back pain     COPD (chronic obstructive pulmonary disease) (HCC)     Ischemic heart disease     Lung disease     Pneumothorax July 17, 2014    right side    Smoker     Vitamin D deficiency        Past Surgical History  Past Surgical History:   Procedure Laterality Date    [place] EGD TRANSORAL BIOPSY SINGLE/MULTIPLE N/A 10/30/2018    EGD performed by

## 2020-03-03 LAB
C. TRACHOMATIS DNA ,URINE: NEGATIVE
N. GONORRHOEAE DNA, URINE: NEGATIVE

## 2020-03-06 ENCOUNTER — TELEPHONE (OUTPATIENT)
Dept: INFECTIOUS DISEASES | Age: 35
End: 2020-03-06

## 2020-04-01 ENCOUNTER — TELEPHONE (OUTPATIENT)
Dept: INFECTIOUS DISEASES | Age: 35
End: 2020-04-01

## 2020-04-01 NOTE — TELEPHONE ENCOUNTER
Pt called in with complaints of issues with his right second toe. C/o redness and \"uncomfortable\"   ---denies fever, pain itchiness drainage. States worse since yesterday    Spoke with CALE Castro-- Keflex 500 mg TID for 7 days. Reviewed all Dr Orders with patient. He is to call with update later this week.

## 2020-04-07 ENCOUNTER — TELEPHONE (OUTPATIENT)
Dept: INFECTIOUS DISEASES | Age: 35
End: 2020-04-07

## 2020-04-08 ENCOUNTER — TELEPHONE (OUTPATIENT)
Dept: INFECTIOUS DISEASES | Age: 35
End: 2020-04-08

## 2020-04-08 NOTE — TELEPHONE ENCOUNTER
Call placed to ot to check on toe infection,     States it is much better and no longer bothering him.      Requested he call with other concerns

## 2020-04-17 ENCOUNTER — TELEPHONE (OUTPATIENT)
Dept: INFECTIOUS DISEASES | Age: 35
End: 2020-04-17

## 2020-04-17 NOTE — TELEPHONE ENCOUNTER
Patient ID:   Kat Normanna  Date:   [phone]  Client ID:       [id number] [phone] (home)   [x]? OK to leave voicemail     Family/House:    []? Partner  []? Children  [x]? Other - grandma     Housing:   home     Transport:  car  Discussed help with transport only as last resort. Employment:  [employer]. Not working now though [address]:   Denies any needs at this time  Discussed referral if needed.   Substance Abuse:   Social History               Socioeconomic History    Marital status: Single       Spouse name: Not on file    Number of children: Not on file    Years of education: Not on file    Highest education level: Not on file   Occupational History    Not on file   Social Needs    Financial resource strain: Not on file    Food insecurity:       Worry: Not on file       Inability: Not on file    Transportation needs:       Medical: Not on file       Non-medical: Not on file   Tobacco Use    Smoking status: Current Every Day Smoker       Packs/day: 0.25       Types: Cigarettes    Smokeless tobacco: Never Used    Tobacco comment: cut down to 4 cig. /day   Substance and Sexual Activity    Alcohol use: No    Drug use: No    Sexual activity: Yes       Partners: Male   Lifestyle    Physical activity:       Days per week: Not on file       Minutes per session: Not on file    Stress: Not on file   Relationships    Social connections:       Talks on phone: Not on file       Gets together: Not on file       Attends Jainism service: Not on file       Active member of club or organization: Not on file       Attends meetings of clubs or organizations: Not on file       Relationship status: Not on file    Intimate partner violence:       Fear of current or ex partner: Not on file       Emotionally abused: Not on file       Physically abused: Not on file       Forced sexual activity: Not on file   Other Topics Concern    Not on file   Social History time              Conducted this yearly update via phone with as precaution for [id number] during public health emergency.

## 2020-04-22 ENCOUNTER — NURSE ONLY (OUTPATIENT)
Dept: INFECTIOUS DISEASES | Age: 35
End: 2020-04-22

## 2020-04-22 NOTE — PROGRESS NOTES
Guidelines    CD4:   Lab Results   Component Value Date    LABABSO 348 11/14/2019       Viral Load:  Lab Results   Component Value Date    [id number] 160,000 10/17/2018    [id number] 2.21 11/14/2019       Medical Insurance:  Payor: Jojo Green / Plan: Nely Mcclain STANLEY / Product Type: *No Product type* /    OHDAP/HIPSCA:  OHDAP   Renewal Date:  4/22/20    Income:    Unemployment    Legal Issues:    NA    Emergency Contact:   Extended Emergency Contact Information  Primary Emergency Contact: Juan Carlos Hensley  Home Phone: [phone]  Relation: Other  Secondary Emergency Contact: Solange Gould  Address: [address] [facility], [address] [place] [address] Phone: [phone]  Relation: Parent  Appointment completed via phone per Covid-19 Guidelines. CM called pt for scheduled appointment. CM assisted pt with completing OHDAP Renewal Application for assistance with premium and medication co-pay payments. CM completed Semi-Annual Review. CM updated RW Part A Eligibility. Pt currently furloughed from employment, receiving unemployment. Pt has insurance via [employer]. CM reviewed [employer] Policy and Sliding Scale Fee with pt. CM updated PSA and SA/MH Assessments. Pt has MH hx, smokes 1/2 pack of cigarettes, daily. No other hx of SA. CM updated ISP, pt agreed with POC. Pt stated he has been compliant with med, did report missing one dose in 7 days. Last VL from 11/19 is 161 copies, 2.21 log value. CM encouraged pt to have current lab work. Pt has not seen a dentist in almost 2 years. Pt stated he had an appointment scheduled with [facility] in March 2020, however, appointment was cancelled due to Covid-19 pandemic. CM encouraged follow up when possible. Pt is sexually active. Pt has significant other who is HIV+ and has another sexual partner, HIV-. Pt reported practicing safe sex 50% of the time. CM provided information regarding U=U, undetectable equals un-transmittable.   CM

## 2020-04-29 ENCOUNTER — TELEPHONE (OUTPATIENT)
Dept: INFECTIOUS DISEASES | Age: 35
End: 2020-04-29

## 2020-04-29 NOTE — TELEPHONE ENCOUNTER
Reschedule/change doc/ lab cancel     Call placed to pt to discuss clinic changes due to COVID 19. Including Doctor schedules, video visits, HDN  and lab cancellation.      CM left to call to discuss

## 2020-05-01 ENCOUNTER — TELEPHONE (OUTPATIENT)
Dept: INFECTIOUS DISEASES | Age: 35
End: 2020-05-01

## 2020-05-19 DIAGNOSIS — B20 AIDS (ACQUIRED IMMUNODEFICIENCY SYNDROME), CD4 <=200 (HCC): Chronic | ICD-10-CM

## 2020-05-19 LAB
ALBUMIN SERPL-MCNC: 4.8 G/DL (ref 3.5–4.6)
ALP BLD-CCNC: 61 U/L (ref 35–104)
ALT SERPL-CCNC: 15 U/L (ref 0–41)
ANION GAP SERPL CALCULATED.3IONS-SCNC: 20 MEQ/L (ref 9–15)
AST SERPL-CCNC: 21 U/L (ref 0–40)
BILIRUB SERPL-MCNC: 0.4 MG/DL (ref 0.2–0.7)
BUN BLDV-MCNC: 19 MG/DL (ref 6–20)
CALCIUM SERPL-MCNC: 10.8 MG/DL (ref 8.5–9.9)
CHLORIDE BLD-SCNC: 106 MEQ/L (ref 95–107)
CO2: 21 MEQ/L (ref 20–31)
CREAT SERPL-MCNC: 1.26 MG/DL (ref 0.7–1.2)
GFR AFRICAN AMERICAN: >60
GFR NON-AFRICAN AMERICAN: >60
GLOBULIN: 3 G/DL (ref 2.3–3.5)
GLUCOSE BLD-MCNC: 64 MG/DL (ref 70–99)
HCT VFR BLD CALC: 46.9 % (ref 42–52)
HEMOGLOBIN: 15.9 G/DL (ref 14–18)
HEPATITIS C ANTIBODY INTERPRETATION: NORMAL
MCH RBC QN AUTO: 33.1 PG (ref 27–31.3)
MCHC RBC AUTO-ENTMCNC: 33.9 % (ref 33–37)
MCV RBC AUTO: 97.7 FL (ref 80–100)
PDW BLD-RTO: 14.3 % (ref 11.5–14.5)
PLATELET # BLD: 221 K/UL (ref 130–400)
POTASSIUM SERPL-SCNC: 4.3 MEQ/L (ref 3.4–4.9)
RBC # BLD: 4.8 M/UL (ref 4.7–6.1)
SODIUM BLD-SCNC: 147 MEQ/L (ref 135–144)
TOTAL PROTEIN: 7.8 G/DL (ref 6.3–8)
WBC # BLD: 6.2 K/UL (ref 4.8–10.8)

## 2020-05-21 LAB
HIV-1 QNT LOG, IU/ML: <1.47 LOG CPY/ML
HIV-1 QNT, IU/ML: ABNORMAL CPY/ML
INTERPRETATION: DETECTED
QUANTI TB GOLD PLUS: NEGATIVE
QUANTI TB1 MINUS NIL: 0 IU/ML (ref 0–0.34)
QUANTI TB2 MINUS NIL: 0.01 IU/ML (ref 0–0.34)
QUANTIFERON MITOGEN: 7.81 IU/ML
QUANTIFERON NIL: 0.02 IU/ML

## 2020-05-24 LAB
ABSOLUTE CD 4 HELPER: 411 CELLS/UL (ref 430–1800)
CD4 % HELPER T CELL: 24 % (ref 32–64)
LYMPHOCYTE SUBSET PANEL 2 INFO: ABNORMAL

## 2020-05-27 ENCOUNTER — VIRTUAL VISIT (OUTPATIENT)
Dept: INFECTIOUS DISEASES | Age: 35
End: 2020-05-27
Payer: COMMERCIAL

## 2020-05-27 ENCOUNTER — TELEPHONE (OUTPATIENT)
Dept: INFECTIOUS DISEASES | Age: 35
End: 2020-05-27

## 2020-05-27 PROCEDURE — 99214 OFFICE O/P EST MOD 30 MIN: CPT | Performed by: INTERNAL MEDICINE

## 2020-06-08 DIAGNOSIS — B20 HUMAN IMMUNODEFICIENCY VIRUS (HCC): ICD-10-CM

## 2020-06-08 DIAGNOSIS — E87.0 HYPERNATREMIA: ICD-10-CM

## 2020-06-08 DIAGNOSIS — N17.9 AKI (ACUTE KIDNEY INJURY) (HCC): ICD-10-CM

## 2020-06-08 LAB
ALBUMIN SERPL-MCNC: 4.5 G/DL (ref 3.5–4.6)
ALP BLD-CCNC: 55 U/L (ref 35–104)
ALT SERPL-CCNC: 14 U/L (ref 0–41)
ANION GAP SERPL CALCULATED.3IONS-SCNC: 15 MEQ/L (ref 9–15)
AST SERPL-CCNC: 16 U/L (ref 0–40)
BILIRUB SERPL-MCNC: <0.2 MG/DL (ref 0.2–0.7)
BUN BLDV-MCNC: 14 MG/DL (ref 6–20)
CALCIUM SERPL-MCNC: 9.3 MG/DL (ref 8.5–9.9)
CHLORIDE BLD-SCNC: 101 MEQ/L (ref 95–107)
CO2: 26 MEQ/L (ref 20–31)
CREAT SERPL-MCNC: 1.02 MG/DL (ref 0.7–1.2)
GFR AFRICAN AMERICAN: >60
GFR NON-AFRICAN AMERICAN: >60
GLOBULIN: 2.9 G/DL (ref 2.3–3.5)
GLUCOSE BLD-MCNC: 93 MG/DL (ref 70–99)
POTASSIUM SERPL-SCNC: 4 MEQ/L (ref 3.4–4.9)
SODIUM BLD-SCNC: 142 MEQ/L (ref 135–144)
TOTAL PROTEIN: 7.4 G/DL (ref 6.3–8)

## 2020-06-09 NOTE — PROGRESS NOTES
including possible esophageal thrush, overall malaise, URI with SOB. On chart review dating back to 2014, had an abnormal Echo with \"ischemia\" noted althought he EF noted to be normal at the time. + hx of recurrent pneumothorax and blebs with a RML lung nodule <1cm. He has a hx of VATS as well. There is also a positive rheumatoid factor from 2014 that was never followed. + hx of reynauds and rash on dorsal hand L hand.    + weight loss >30lbs over 8 months.   + cigarette use, no illicit drugs  No alcohol. Has been in pain management in the past with Dr Theresa Latham due to persistent back pain from neck to lumbar region, which he thought was from sleeping on the couch, but seems to be persistent.    ~20 partners over the course of his lifetime, all male. 1 female initially. No TB exposure that he knows of. Likes to travel - has been to [place] this past February and [place] in the past, [facility] as a teenager. + dogs at home. Works as a manager at [employer]. Has another partner outside of the relationship - that person is on truvada [place])  Current partner - Jonathan Pendleton. No other hx of STDs in the past.   Has been in detention a day or two.      Support system: mom and grandma and siblings - only the brother and mother know of his status. In an emergency contact Jonathan Pendleton or his mother. Both have full disclosure. In case Jonathan Pendleton can't be reached then can talk to his mom.     Labs ok and reviewed with patient. Skipped a few days of his medications when he went out of town bc he forgot them  Teeth are bothering him applies oragel. Needs dental appointment. Requesting viagra. All 14 systems reviewed with patient and are negative other than as stated above.     Medication History  Current Outpatient Medications   Medication Sig Dispense Refill    TRIUMEQ 600- MG TABS       vitamin B-1 100 MG tablet Take 1 tablet by mouth daily 30 tablet 3    vitamin D (CHOLECALCIFEROL) 1000 UNIT TABS tablet Take 1,000 Units by mouth daily      folic acid-pyridoxine-cyanocobalamine (FOLTX) 2.5-25-1 MG TABS tablet Take 1 tablet by mouth daily      Tiotropium Bromide-Olodaterol (STIOLTO RESPIMAT) 2.5-2.5 MCG/ACT AERS Inhale into the lungs      albuterol (PROVENTIL) (2.5 MG/3ML) 0.083% nebulizer solution use 1 (ONE) vial (3 (THREE) mls) via NEBULIZER EVERY FOUR HOURS AS NEEDED for FOR WHEEZING  0    VENTOLIN  (90 BASE) MCG/ACT inhaler inhale 1-2 puffs BY MOUTH EVERY FOUR HOURS AS NEEDED for FOR WHEEZING  0    SUMAtriptan (IMITREX) 100 MG tablet       lisdexamfetamine (VYVANSE) 70 MG capsule Take 70 mg by mouth daily       No current facility-administered medications for this visit.         Past Medical History  Past Medical History:   Diagnosis Date    ADHD     [facility]     Anemia     Asthma     Chronic back pain     COPD [facility]chronic obstructive pulmonary disease) [facility])     Ischemic heart disease     Lung disease     Pneumothorax July 17, 2014    right side    Smoker     Vitamin D deficiency        Past Surgical History  Past Surgical History:   Procedure Laterality Date    [place] EGD TRANSORAL BIOPSY SINGLE/MULTIPLE N/A 10/30/2018    EGD performed by Jere Maria MD at [place] [address]  7/17/14    Right VATS with stapling of apical blebs and parietal pleurectomy       Allergies  Allergies   Allergen Reactions    Tramadol Other (See Comments)     Does not remember what the reaction was to tramadol    Codeine Itching    Ketorolac Rash    Pcn [Penicillins] Nausea Only     PCN    Toradol [Ketorolac Tromethamine] Nausea Only       Family History  Family History   Problem Relation Age of Onset    Hypertension Mother        Immunization History  Immunization History   Administered Date(s) Administered    Hepatitis A Vaccine 02/19/2020    Influenza Vaccine, unspecified formulation 11/19/2014    Influenza Virus Vaccine 11/19/2014, 11/13/2018    Influenza, Quadv, 6 mo

## 2020-06-10 ENCOUNTER — VIRTUAL VISIT (OUTPATIENT)
Dept: INFECTIOUS DISEASES | Age: 35
End: 2020-06-10
Payer: COMMERCIAL

## 2020-06-10 PROCEDURE — 99214 OFFICE O/P EST MOD 30 MIN: CPT | Performed by: INTERNAL MEDICINE

## 2020-06-15 ENCOUNTER — TELEPHONE (OUTPATIENT)
Dept: INFECTIOUS DISEASES | Age: 35
End: 2020-06-15

## 2020-08-12 ENCOUNTER — TELEPHONE (OUTPATIENT)
Dept: INFECTIOUS DISEASES | Age: 35
End: 2020-08-12

## 2020-08-12 ENCOUNTER — HOSPITAL ENCOUNTER (EMERGENCY)
Age: 35
Discharge: HOME | End: 2020-08-12
Payer: COMMERCIAL

## 2020-08-12 VITALS
TEMPERATURE: 97.9 F | OXYGEN SATURATION: 99 % | WEIGHT: 180 LBS | DIASTOLIC BLOOD PRESSURE: 101 MMHG | SYSTOLIC BLOOD PRESSURE: 158 MMHG | HEART RATE: 95 BPM | BODY MASS INDEX: 23.86 KG/M2 | HEIGHT: 73 IN | RESPIRATION RATE: 18 BRPM

## 2020-08-12 PROCEDURE — 99283 EMERGENCY DEPT VISIT LOW MDM: CPT

## 2020-08-12 RX ORDER — OXYCODONE HYDROCHLORIDE AND ACETAMINOPHEN 5; 325 MG/1; MG/1
1 TABLET ORAL EVERY 6 HOURS PRN
Qty: 10 TABLET | Refills: 0 | Status: SHIPPED | OUTPATIENT
Start: 2020-08-12 | End: 2020-08-15

## 2020-08-12 RX ORDER — SULFAMETHOXAZOLE AND TRIMETHOPRIM 800; 160 MG/1; MG/1
1 TABLET ORAL 2 TIMES DAILY
Qty: 20 TABLET | Refills: 0 | Status: SHIPPED | OUTPATIENT
Start: 2020-08-12 | End: 2020-08-22

## 2020-08-12 RX ORDER — CYCLOBENZAPRINE HCL 10 MG
10 TABLET ORAL 3 TIMES DAILY PRN
Qty: 15 TABLET | Refills: 0 | Status: SHIPPED | OUTPATIENT
Start: 2020-08-12 | End: 2020-08-22

## 2020-08-12 RX ORDER — IBUPROFEN 800 MG/1
800 TABLET ORAL EVERY 8 HOURS PRN
Qty: 20 TABLET | Refills: 0 | Status: SHIPPED | OUTPATIENT
Start: 2020-08-12

## 2020-08-12 ASSESSMENT — ENCOUNTER SYMPTOMS
DIARRHEA: 0
SHORTNESS OF BREATH: 0
BACK PAIN: 1
ABDOMINAL PAIN: 0
NAUSEA: 0
VOMITING: 0
COUGH: 0

## 2020-08-12 ASSESSMENT — PAIN SCALES - GENERAL: PAINLEVEL_OUTOF10: 6

## 2020-08-12 ASSESSMENT — PAIN DESCRIPTION - LOCATION: LOCATION: BACK

## 2020-08-12 ASSESSMENT — PAIN DESCRIPTION - PAIN TYPE: TYPE: ACUTE PAIN

## 2020-08-12 ASSESSMENT — PAIN - FUNCTIONAL ASSESSMENT: PAIN_FUNCTIONAL_ASSESSMENT: 0-10

## 2020-08-12 ASSESSMENT — PAIN DESCRIPTION - ORIENTATION: ORIENTATION: LOWER

## 2020-08-12 NOTE — TELEPHONE ENCOUNTER
Pt called back stated they can not get him in until Monday. He is worried about infection    Reviewed with Dr Rosa Alejo- pt needs to go to ER for evaluation. Possible IND needed. Call placed back to pt. reviewed what Dr Rosa Alejo said. He will go to ER today and call with update.

## 2020-08-12 NOTE — ED TRIAGE NOTES
A & Ox4. Skin pink warm and dry. States his entire lower back hurts [place] States pulled skin off right index finger also and now it's swollen and infected.

## 2020-08-12 NOTE — ED NOTES
D/c instructions and rx's given to patient by ANJANA Magana. Pt got up and ambulated out with fairly steady gait.      Rayne Rodriguez RN  08/12/20 1911

## 2020-08-12 NOTE — TELEPHONE ENCOUNTER
Spoke with Dr Avelina Arenas. referral to Dr Miley Vanessa for possible need for IND. Spoke with Dr Miley Vanessa office. Phone: [phone]. Per [facility] no referral needed. Pt to call and schedule. Call placed back to pt. Reviewed all of the above information with him. All Dr Miley Vanessa office information reviewed with pt. Importance of calling as soon as he can to schedule apt reinforced. He will call office with update.

## 2020-08-12 NOTE — ED PROVIDER NOTES
[facility] ED  eMERGENCY dEPARTMENT eNCOUnter      Pt Name: Tita Blizzard  MRN: [id number]  [place] 1985  Date of evaluation: 8/12/2020  Provider: PATRIZIA Trujillo CNP      HISTORY OF PRESENT ILLNESS    Tita Blizzard is a 28 y.o. male who presents to the Emergency Department with low back pain after falling on Monday. Patient states he twisted his back. He also scratched his finger and now has a pus pocket below the cuticle and the finger is tender. Pain is moderate. Denies saddle anesthesia, foot drop or incontinence of bowel or bladder. REVIEW OF SYSTEMS       Review of Systems   Constitutional: Negative for activity change, appetite change and fever. HENT: Negative for congestion. Respiratory: Negative for cough and shortness of breath. Cardiovascular: Negative for chest pain. Gastrointestinal: Negative for abdominal pain, diarrhea, nausea and vomiting. Genitourinary: Negative for dysuria. Musculoskeletal: Positive for back pain. Negative for arthralgias and neck pain. Skin: Positive for wound. Negative for rash. All other systems reviewed and are negative.         PAST MEDICAL HISTORY     Past Medical History:   Diagnosis Date    ADHD     [facility] syndrome) [facility] [address]     Anemia     Asthma     Chronic back pain     [facility]     Ischemic heart disease     Lung disease     Pneumothorax July 17, 2014    right side    Smoker     Vitamin D deficiency          SURGICAL HISTORY       Past Surgical History:   Procedure Laterality Date    [place] EGD TRANSORAL BIOPSY SINGLE/MULTIPLE N/A 10/30/2018    EGD performed by Shi Almanza MD at [address]  7/17/14    Right VATS with stapling of apical blebs and parietal pleurectomy         CURRENT MEDICATIONS       Previous Medications    ALBUTEROL (PROVENTIL) (2.5 MG/3ML) 0.083% NEBULIZER SOLUTION    use 1 (ONE) vial (3 (THREE) mls) via NEBULIZER EVERY FOUR HOURS AS NEEDED for FOR WHEEZING    CEPHALEXIN (KEFLEX) 500 MG CAPSULE    take 1 capsule by mouth three times a day    FOLIC ACID-PYRIDOXINE-CYANOCOBALAMINE (FOLTX) 2.5-25-1 MG TABS TABLET    Take 1 tablet by mouth daily    LISDEXAMFETAMINE (VYVANSE) 70 MG CAPSULE    Take 70 mg by mouth daily    SILDENAFIL (VIAGRA) 25 MG TABLET    TAKE 1 TABLET BY MOUTH 1 HOUR PRIOR TO SEXUAL ACTIVITY    SUMATRIPTAN (IMITREX) 100 MG TABLET        TIOTROPIUM BROMIDE-OLODATEROL (STIOLTO RESPIMAT) 2.5-2.5 MCG/ACT AERS    Inhale into the lungs    TRIUMEQ 600- MG TABS        VENTOLIN  (90 BASE) MCG/ACT INHALER    inhale 1-2 puffs BY MOUTH EVERY FOUR HOURS AS NEEDED for FOR WHEEZING    VITAMIN B-1 100 MG TABLET    Take 1 tablet by mouth daily    VITAMIN D (CHOLECALCIFEROL) 1000 UNIT TABS TABLET    Take 1,000 Units by mouth daily       ALLERGIES     Tramadol; Codeine; Ketorolac; Pcn [penicillins]; and Toradol [ketorolac tromethamine]    FAMILY HISTORY       Family History   Problem Relation Age of Onset    Hypertension Mother           SOCIAL HISTORY       Social History     Socioeconomic History    Marital status: Single     Spouse name: None    Number of children: None    Years of education: None    Highest education level: None   Occupational History    None   Social Needs    Financial resource strain: None    Food insecurity     Worry: None     Inability: None    Transportation needs     Medical: None     Non-medical: None   Tobacco Use    Smoking status: Current Every Day Smoker     Packs/day: 0.25     Types: Cigarettes    Smokeless tobacco: Never Used    Tobacco comment: cut down to 4 cig. /day   Substance and Sexual Activity    Alcohol use: No    Drug use: No    Sexual activity: Yes     Partners: Male   Lifestyle    Physical activity     Days per week: None     Minutes per session: None    Stress: None   Relationships    Social connections     Talks on phone: None     Gets together: None     Attends Yazidism service: None     Active member of club or organization: None     Attends meetings of clubs or organizations: None     Relationship status: None    Intimate partner violence     Fear of current or ex partner: None     Emotionally abused: None     Physically abused: None     Forced sexual activity: None   Other Topics Concern    None   Social History Narrative    None       SCREENINGS      @FLOW([id number])@      PHYSICAL EXAM    (up to 7 for level 4, 8 or more for level 5)     ED Triage Vitals [08/12/20 1816]   BP Temp Temp Source Pulse Resp SpO2 Height Weight   (!) 158/101 97.9 °F (36.6 °C) Oral 95 18 99 % 6' 1\" (1.854 m) 180 lb (81.6 kg)       Physical Exam  Vitals signs and nursing note reviewed. Constitutional:       Appearance: He is well-developed. HENT:      Head: Normocephalic and atraumatic. Right Ear: External ear normal.      Left Ear: External ear normal.   Eyes:      Conjunctiva/sclera: Conjunctivae normal.      Pupils: Pupils are equal, round, and reactive to light. Neck:      Musculoskeletal: Normal range of motion and neck supple. Cardiovascular:      Rate and Rhythm: Normal rate and regular rhythm. Pulmonary:      Effort: Pulmonary effort is normal.      Breath sounds: Normal breath sounds. Abdominal:      General: Bowel sounds are normal. There is no distension. Palpations: Abdomen is soft. Tenderness: There is no abdominal tenderness. Musculoskeletal: Normal range of motion. Lumbar back: He exhibits tenderness, pain and spasm. He exhibits normal range of motion, no bony tenderness, no swelling, no deformity and normal pulse. Back:       Right hand: He exhibits tenderness. He exhibits normal range of motion, no bony tenderness, normal capillary refill and no deformity. Normal sensation noted. Normal strength noted. Hands:    Skin:     General: Skin is warm and dry.    Neurological:      Mental Status: He is alert and oriented to person, place, and time. Deep Tendon Reflexes: Reflexes are normal and symmetric. Psychiatric:         Judgment: Judgment normal.           All other labs were within normal range or not returned as of this dictation. EMERGENCY DEPARTMENT COURSE and DIFFERENTIALDIAGNOSIS/MDM:   Vitals:    Vitals:    08/12/20 1816   BP: (!) 158/101   Pulse: 95   Resp: 18   Temp: 97.9 °F (36.6 °C)   TempSrc: Oral   SpO2: 99%   Weight: 180 lb (81.6 kg)   Height: 6' 1\" (1.854 m)            28 yr old male with lumbar strain and R index paronychia. Prescriptions for Motrin, Percocet, Flexeril and Bactrim DS were given to the patient. F/U with PCP in 3 days. Patient verbalizes understanding. PROCEDURES:  Unless otherwise noted below, none     Procedures      FINAL IMPRESSION      1. Strain of lumbar region, initial encounter    2.  Paronychia of finger of right hand          DISPOSITION/PLAN   DISPOSITION Decision To Discharge 08/12/2020 07:04:46 PM          PATRIZIA De León CNP (electronically signed)  Attending Emergency Physician     PATRIZIA De León CNP  08/12/20 0191

## 2020-08-13 NOTE — PROGRESS NOTES
Reviewed ER report with Dr Weston Mckeon. Pt needs referral to Dr Andry Infante. Cancel Surgery consult for now. Will call and discuss with pt.

## 2020-08-13 NOTE — TELEPHONE ENCOUNTER
Referral faxed to [facility] Dr Kevin Gao. [phone] with confirmation.     Still awaiting pt call to discuss referral.

## 2020-08-13 NOTE — TELEPHONE ENCOUNTER
Was able to discuss new referral. He states they did already call him, left him a VM. States is feeling better. They did drain it in ER. Can bend it  Little more now. Will call wound center to schedule and Dr Amanda Leonardo office to reschedule for PCP establishment.      Will call office with updates

## 2020-08-21 ENCOUNTER — TELEPHONE (OUTPATIENT)
Dept: INFECTIOUS DISEASES | Age: 35
End: 2020-08-21

## 2020-08-21 NOTE — TELEPHONE ENCOUNTER
Per Dr Alona Ziegler he needs to follow up with PCP for dose changing. Notified pt of Dr Alona Ziegler orders. He verbalized understanding.

## 2020-08-21 NOTE — TELEPHONE ENCOUNTER
Pt called in requesting increased dose of Viagra. States 25 mg is not working. Will discuss with Dr Georgina Quesada

## 2020-08-31 DIAGNOSIS — B20 AIDS (ACQUIRED IMMUNODEFICIENCY SYNDROME), CD4 <=200 (HCC): Chronic | ICD-10-CM

## 2020-08-31 LAB
ALBUMIN SERPL-MCNC: 4.1 G/DL (ref 3.5–4.6)
ALP BLD-CCNC: 64 U/L (ref 35–104)
ALT SERPL-CCNC: 12 U/L (ref 0–41)
ANION GAP SERPL CALCULATED.3IONS-SCNC: 11 MEQ/L (ref 9–15)
AST SERPL-CCNC: 14 U/L (ref 0–40)
BILIRUB SERPL-MCNC: <0.2 MG/DL (ref 0.2–0.7)
BUN BLDV-MCNC: 18 MG/DL (ref 6–20)
CALCIUM SERPL-MCNC: 9 MG/DL (ref 8.5–9.9)
CHLORIDE BLD-SCNC: 100 MEQ/L (ref 95–107)
CO2: 28 MEQ/L (ref 20–31)
CREAT SERPL-MCNC: 1.12 MG/DL (ref 0.7–1.2)
GFR AFRICAN AMERICAN: >60
GFR NON-AFRICAN AMERICAN: >60
GLOBULIN: 2.6 G/DL (ref 2.3–3.5)
GLUCOSE BLD-MCNC: 94 MG/DL (ref 70–99)
HCT VFR BLD CALC: 43 % (ref 42–52)
HEMOGLOBIN: 14.8 G/DL (ref 14–18)
MCH RBC QN AUTO: 33.2 PG (ref 27–31.3)
MCHC RBC AUTO-ENTMCNC: 34.4 % (ref 33–37)
MCV RBC AUTO: 96.6 FL (ref 80–100)
PDW BLD-RTO: 13.9 % (ref 11.5–14.5)
PLATELET # BLD: 303 K/UL (ref 130–400)
POTASSIUM SERPL-SCNC: 3.9 MEQ/L (ref 3.4–4.9)
RBC # BLD: 4.45 M/UL (ref 4.7–6.1)
SODIUM BLD-SCNC: 139 MEQ/L (ref 135–144)
TOTAL PROTEIN: 6.7 G/DL (ref 6.3–8)
WBC # BLD: 7.6 K/UL (ref 4.8–10.8)

## 2020-09-01 LAB — RPR: NORMAL

## 2020-09-02 LAB
ABSOLUTE CD 4 HELPER: 530 CELLS/UL (ref 430–1800)
CD4 % HELPER T CELL: 27 % (ref 32–64)
LYMPHOCYTE SUBSET PANEL 2 INFO: ABNORMAL
SEX HORMONE BINDING GLOBULIN: 35 NMOL/L (ref 11–80)
TESTOSTERONE FREE PERCENT: 1.9 % (ref 1.6–2.9)
TESTOSTERONE FREE, CALC: 111 PG/ML (ref 47–244)
TESTOSTERONE TOTAL-MALE: 592 NG/DL (ref 300–1080)

## 2020-09-03 ENCOUNTER — TELEPHONE (OUTPATIENT)
Dept: INFECTIOUS DISEASES | Age: 35
End: 2020-09-03

## 2020-09-04 LAB
HIV-1 QNT LOG, IU/ML: <1.47 LOG CPY/ML
HIV-1 QNT, IU/ML: ABNORMAL CPY/ML
INTERPRETATION: DETECTED

## 2020-09-08 LAB
C. TRACHOMATIS DNA ,URINE: NEGATIVE
N. GONORRHOEAE DNA, URINE: NEGATIVE

## 2020-09-09 ENCOUNTER — VIRTUAL VISIT (OUTPATIENT)
Dept: INFECTIOUS DISEASES | Age: 35
End: 2020-09-09
Payer: COMMERCIAL

## 2020-09-09 PROCEDURE — 99214 OFFICE O/P EST MOD 30 MIN: CPT | Performed by: INTERNAL MEDICINE

## 2020-09-09 NOTE — PROGRESS NOTES
2020    TELEHEALTH EVALUATION -- Audio/Visual (During IBRSI-94 public health emergency)      Rehan Dumont (:  1985) has requested an audio/video evaluation for the following concern(s):    HIV    Due to the COVID-19 outbreak, patient's office visit was converted to a virtual visit. Patient was contacted and agreed to proceed with a virtual visit with me via Doxy. me with my location at the office. Patient reports that they are located at home. The risks and benefits of converting to a virtual visit were discussed in light of the current infectious disease epidemic. Services were provided through an audio/video synchronous discussion virtually to substitute for in person clinic visit. THIS virtual visit was conducted via interactive/real-time audio/video. Due to this being a TeleHealth encounter, evaluation of the following organ systems is limited: Vitals/Constitutional/EENT/Resp/CV/GI//MS/Neuro/Skin/Heme-Lymph-Imm.}    Pursuant to the emergency declaration under the [facility], [facility] waiver authority and the [employer] Act, this Virtual Visit was conducted, with patient's consent, to reduce the patient's risk of exposure to COVID-19 and provide care for the patient. Patient Name: Rehan Dumont  YOB: 1985  Patient Sex: male  Medical Record Number: [id number]    Background:  Patient with HIV + MSM with a longterm HIV+ partner since . Partner's regimen is unknown. Last prior negative HIV blood test was in , although he says he got a mouth swab in 2016 and was told it was negative. Presented to my office with mario CD4 <50, 8%, viral load is 160,000. G6PD negative, RPR negative, HLA negative, serum quantiferon negative, chlam/gonorrhea negative, Hepatitis negative.    Genotype with resistance to stavudine and zidovudine, otherwise negative      Presented with + thrush, including possible esophageal thrush, overall malaise, URI with SOB. On chart review dating back to 2014, had an abnormal Echo with \"ischemia\" noted althought he EF noted to be normal at the time. + hx of recurrent pneumothorax and blebs with a RML lung nodule <1cm. He has a hx of VATS as well. There is also a positive rheumatoid factor from 2014 that was never followed. + hx of reynauds and rash on dorsal hand L hand.    + weight loss >30lbs over 8 months.   + cigarette use, no illicit drugs  No alcohol. Has been in pain management in the past with Dr Calli Badillo due to persistent back pain from neck to lumbar region, which he thought was from sleeping on the couch, but seems to be persistent.    ~20 partners over the course of his lifetime, all male. 1 female initially. No TB exposure that he knows of. Likes to travel - has been to [place] this past February and [facility] in the past, [facility] as a teenager. + dogs at home. Works as a manager at [employer]. Has another partner outside of the relationship - that person is on truvada Iman Aldrich)  Current partner - Sondra Mccain. No other hx of STDs in the past.   Has been in group home a day or two.      Support system: mom and grandma and siblings - only the brother and mother know of his status. In an emergency contact Sondra Mccain or his mother. Both have full disclosure. In case Sondra Mccain can't be reached then can talk to his mom.  -------------------------------------------------------------------------------------------------        Labs ok and reviewed with patient. Poor dentition, now with tooth infection/ broken tooth    All 14 systems reviewed with patient and are negative other than as stated above.     Medication History  Current Outpatient Medications   Medication Sig Dispense Refill    ibuprofen (IBU) 800 MG tablet Take 1 tablet by mouth every 8 hours as needed for Pain 20 tablet 0    sildenafil (VIAGRA) 25 MG tablet TAKE 1 TABLET BY MOUTH 1 HOUR PRIOR TO SEXUAL ACTIVITY      cephALEXin (KEFLEX) 500 MG capsule take 1 capsule by mouth three times a day      TRIUMEQ 600- MG TABS       vitamin B-1 100 MG tablet Take 1 tablet by mouth daily 30 tablet 3    vitamin D (CHOLECALCIFEROL) 1000 UNIT TABS tablet Take 1,000 Units by mouth daily      folic acid-pyridoxine-cyanocobalamine (FOLTX) 2.5-25-1 MG TABS tablet Take 1 tablet by mouth daily      Tiotropium Bromide-Olodaterol (STIOLTO RESPIMAT) 2.5-2.5 MCG/ACT AERS Inhale into the lungs      albuterol (PROVENTIL) (2.5 MG/3ML) 0.083% nebulizer solution use 1 (ONE) vial (3 (THREE) mls) via NEBULIZER EVERY FOUR HOURS AS NEEDED for FOR WHEEZING  0    VENTOLIN  (90 BASE) MCG/ACT inhaler inhale 1-2 puffs BY MOUTH EVERY FOUR HOURS AS NEEDED for FOR WHEEZING  0    SUMAtriptan (IMITREX) 100 MG tablet       lisdexamfetamine (VYVANSE) 70 MG capsule Take 70 mg by mouth daily       No current facility-administered medications for this visit.         Past Medical History  Past Medical History:   Diagnosis Date    ADHD     [facility]ac[facility] syndrome) ([facility] [address]     Anemia     Asthma     Chronic back pain     [facility]     Ischemic heart disease     Lung disease     Pneumothorax July 17, 2014    right side    Smoker     Vitamin D deficiency        Past Surgical History  Past Surgical History:   Procedure Laterality Date    [place] E[place] TRANSORAL BIOPSY SINGLE/MULTIPLE N/A 10/30/2018    EGD performed by Ashlee Irwin MD at [place] [address]  7/17/14    Right VATS with stapling of apical blebs and parietal pleurectomy       Allergies  Allergies   Allergen Reactions    Tramadol Other (See Comments)     Does not remember what the reaction was to tramadol    Codeine Itching    Ketorolac Rash    Pcn [Penicillins] Nausea Only     PCN    Toradol [Ketorolac Tromethamine] Nausea Only       Family History  Family History   Problem Relation Age of Onset    Hypertension Mother        Immunization History  Immunization History   Administered Date(s) Administered    Hepatitis A Vaccine 02/19/2020    Influenza Vaccine, unspecified formulation 11/19/2014    Influenza Virus Vaccine 11/19/2014, 11/13/2018    Influenza, Quadv, 6 mo and older, IM (Fluzone, Flulaval) 11/13/2018    Influenza, Quadv, IM, PF (6 mo and older Fluzone, Flulaval, Fluarix, and 3 yrs and older Afluria) 01/29/2020    Pneumococcal Conjugate 13-valent ([id number]) 02/19/2020    Pneumococcal Polysaccharide ([id number]) 04/06/2009    Tdap (Boostrix, Adacel) 02/19/2020       Physical Exam  Since we cannot conduct an in-person exam, the following were addressed with the patient. I have asked the patient to assist in their exam:  Patient denies any general new issues. Patient does not observe any new skin rashes or ulcers. Patient does not perceive any new visual deficits  Patient does not feel like they are \"clammy\" or sweating  Patient denies any dry mouth or sore mouth and is able to flex and extend her neck with ease. Patient can inhale and exhale without any difficulty and feels like their chest is expanding symmetrically. They do not feel short of breath or any distress when asked to move around. No pain with expansion of the chest  Patient is able to feel their own pulse and agrees it is regular. Patient states their abdomen is not protruberant beyond their normal size. States that there is no pain when touching the area beneath the sternum when directed, or the left or the right side of the abdomen. They feel no pain when asked to press on the suprapubic area or the right or left flank. Patient denies any pain when asked to squeeze both upper legs and lower legs anteriorly or posteriorly. They do not see any new swelling of their joints.   No observed neurological changes or slurred speech when speaking to the patient            Chemistry        Component Value Date/Time     08/31/2020 1206    K 3.9 08/31/2020 1206     08/31/2020 1206    CO2 28 08/31/2020 1206    BUN 18 08/31/2020 1206    CREATININE 1.12 08/31/2020 1206        Component Value Date/Time    CALCIUM 9.0 08/31/2020 1206    ALKPHOS 64 08/31/2020 1206    AST 14 08/31/2020 1206    ALT 12 08/31/2020 1206    BILITOT <0.2 08/31/2020 1206        Patient has been informed of the importance of protection during sexual encounters which include, but are not limited to vaginal intercourse, anal intercourse, and oral sex. Patient has also been made aware that it is a felony in the [address] [facility] [address] [address] to engage in a sexual encounter without first disclosing HIV status to partner. Assessment/Plan:  HIV infection - undetectable. Labs ok. Hx of ischemic heart disease  Recurrent pneumothorax history with blebs   Persistent back pain   Hx of broken teeth/poor dentition - tooth broke more and had swollen face. Taking abx R side - clindamicin. Pain. Referred to [facility] vs [facility]. Plans before next visit:   Needs dental appointment  Needs PCP appointment   Flu shot in the fall. Patient was counseled on the importance of proper handwashing, especially after handling wounds, surfaces, urine, and stool. If any diarrhea develops, patient is to refrain from the use of anti-diarrhea medications and is to call me immediately. Patient should refrain from touching their face. Questions regarding the current COVID outbreak addressed as needed. Follow up in 4 months.      Time spent with patient: 25 min    [address]

## 2020-09-11 ENCOUNTER — TELEPHONE (OUTPATIENT)
Dept: INFECTIOUS DISEASES | Age: 35
End: 2020-09-11

## 2020-09-11 NOTE — TELEPHONE ENCOUNTER
Call placed to pt as V/V follow up. Reviewed apt with Dr. Dawit Bagley He States it went well    Pt has no questions regarding apt. We did discuss tooth infection. States it is much better now. He is on abx. Pt did not follow up with dental. States [facility] was to far behind due to covid. He will be calling [facility] Monday to see if he can get in there. He will call with update. Walk in clinic in [place] for vacation. Visited some family. Denies any other needs or acute issues that need doctor attention.

## 2020-09-23 ENCOUNTER — TELEPHONE (OUTPATIENT)
Dept: INFECTIOUS DISEASES | Age: 35
End: 2020-09-23

## 2020-10-07 ENCOUNTER — NURSE ONLY (OUTPATIENT)
Dept: INFECTIOUS DISEASES | Age: 35
End: 2020-10-07
Payer: COMMERCIAL

## 2020-10-07 PROCEDURE — 90471 IMMUNIZATION ADMIN: CPT | Performed by: INTERNAL MEDICINE

## 2020-10-07 PROCEDURE — 90686 IIV4 VACC NO PRSV 0.5 ML IM: CPT | Performed by: INTERNAL MEDICINE

## 2020-10-07 NOTE — PROGRESS NOTES
Pt presented pt office for scheduled apt. Here today for Flu vaccine. Doing ok. No issues. No issues with medications. Denies SOB, chest tightness, cough, headache or known recent exposure to [address], \"Influenza - Inactivated\"  given to Latrice Xiao, or parent/legal guardian of  Latrice Xiao and verbalized understanding. Patient responses:    Have you ever had a reaction to a flu vaccine? No  Are you able to eat eggs without adverse effects? Yes  Do you have any current illness? No  Have you ever had Guillian Bellevue Syndrome? No    Flu vaccine given per order. Please see immunization tab. After obtaining consent, and per orders of Dr. Norbert Holley, injection of  0.5  m.l   FLU VACCINE  Was administered to Right deltoid. Manf:  Kit Disla  Lot #: [id number] EXP: June 30, 2021  [address]: [id number]    Patient tolerated well. Denies pain at site. Patient instructed to remain in clinic for 5-10 minutes afterwards, and to report any adverse reaction to me immediately. CM apt today for updates and odap.      Denies and other concerns or issues      -

## 2020-12-07 ENCOUNTER — TELEPHONE (OUTPATIENT)
Dept: INFECTIOUS DISEASES | Age: 35
End: 2020-12-07

## 2020-12-07 NOTE — TELEPHONE ENCOUNTER
CM received message via RN that pt is having an issue regarding premium payments for medical coverage. CM met with pt in October, OHDAP Renewal Application was completed and submitted to [address] 10/8/20. CM called pt, left message requesting return call. CM also left message for Mariana Ritchie at [address] regarding issue. CM will follow up with pt regarding premium payment via OD/OHDAP.

## 2020-12-08 ENCOUNTER — TELEPHONE (OUTPATIENT)
Dept: INFECTIOUS DISEASES | Age: 35
End: 2020-12-08

## 2020-12-08 NOTE — TELEPHONE ENCOUNTER
CM reached out to Christos Talbot at [address] via e-mail regarding concern of premium payment not being paid by [employer]. Mr. Abdullahi Gamble informed CM that pt was found eligible for premium payments. No gaps in payments were noted. CM will inform pt and RN of same.

## 2020-12-15 ENCOUNTER — TELEPHONE (OUTPATIENT)
Dept: INFECTIOUS DISEASES | Age: 35
End: 2020-12-15

## 2021-01-06 ENCOUNTER — TELEPHONE (OUTPATIENT)
Dept: INFECTIOUS DISEASES | Age: 36
End: 2021-01-06

## 2021-01-13 ENCOUNTER — TELEPHONE (OUTPATIENT)
Dept: INFECTIOUS DISEASES | Age: 36
End: 2021-01-13

## 2021-01-13 NOTE — TELEPHONE ENCOUNTER
Pt called in stating he has been pulled to work and is unable to have apt on scheduled day. Was able to reschedule pt with out complaints or concerns. 2.3.21 he will complete labs as soon as he can. We discussed the importance. Denies problems. Discussed with pt to call if problems occur before appt or for further questions/concerns. He verbalized understanding. Denies any needs at this time. Denies any issues that need Doctor attention.  Will call with concerns

## 2021-02-26 ENCOUNTER — TELEPHONE (OUTPATIENT)
Dept: INFECTIOUS DISEASES | Age: 36
End: 2021-02-26

## 2021-02-26 NOTE — TELEPHONE ENCOUNTER
Spoke with pt regarding lab work. He will go next weekend. We discussed need for yearly labs. He is ok with this. Will fast.   Scheduled apt for 3.18.2021 V/V   Pt denies any other issues or concerns. Denies any acute concerns that need doctor attention. Requested he call office if needed.

## 2021-03-08 ENCOUNTER — TELEPHONE (OUTPATIENT)
Dept: INFECTIOUS DISEASES | Age: 36
End: 2021-03-08

## 2021-03-08 NOTE — TELEPHONE ENCOUNTER
Call placed to pt regarding need to update RW eligibility and paperwork. Reviewed all needed documentation for update. This includes proof of income ( 1 month total, most recent) proof of residency and ID. -- pt will need to provide updated income verification and any changes to insurance. pt is aware of the program, verbalized understanding and denies questions.      CM apt made 3.15.21 at 1 pm      Denies other issues or concerns and will call office if needed

## 2021-03-15 ENCOUNTER — NURSE ONLY (OUTPATIENT)
Dept: INFECTIOUS DISEASES | Age: 36
End: 2021-03-15

## 2021-03-15 DIAGNOSIS — B20 AIDS (ACQUIRED IMMUNODEFICIENCY SYNDROME), CD4 <=200 (HCC): Chronic | ICD-10-CM

## 2021-03-15 LAB
ALBUMIN SERPL-MCNC: 4.6 G/DL (ref 3.5–4.6)
ALP BLD-CCNC: 60 U/L (ref 35–104)
ALT SERPL-CCNC: 18 U/L (ref 0–41)
ANION GAP SERPL CALCULATED.3IONS-SCNC: 13 MEQ/L (ref 9–15)
AST SERPL-CCNC: 23 U/L (ref 0–40)
BILIRUB SERPL-MCNC: 0.4 MG/DL (ref 0.2–0.7)
BUN BLDV-MCNC: 21 MG/DL (ref 6–20)
CALCIUM SERPL-MCNC: 9.5 MG/DL (ref 8.5–9.9)
CHLORIDE BLD-SCNC: 100 MEQ/L (ref 95–107)
CO2: 28 MEQ/L (ref 20–31)
CREAT SERPL-MCNC: 1.03 MG/DL (ref 0.7–1.2)
GFR AFRICAN AMERICAN: >60
GFR NON-AFRICAN AMERICAN: >60
GLOBULIN: 3 G/DL (ref 2.3–3.5)
GLUCOSE BLD-MCNC: 96 MG/DL (ref 70–99)
HCT VFR BLD CALC: 44.2 % (ref 42–52)
HEMOGLOBIN: 15.2 G/DL (ref 14–18)
MCH RBC QN AUTO: 32.3 PG (ref 27–31.3)
MCHC RBC AUTO-ENTMCNC: 34.4 % (ref 33–37)
MCV RBC AUTO: 93.8 FL (ref 80–100)
PDW BLD-RTO: 14.4 % (ref 11.5–14.5)
PLATELET # BLD: 229 K/UL (ref 130–400)
POTASSIUM SERPL-SCNC: 3.4 MEQ/L (ref 3.4–4.9)
RBC # BLD: 4.72 M/UL (ref 4.7–6.1)
SODIUM BLD-SCNC: 141 MEQ/L (ref 135–144)
TOTAL PROTEIN: 7.6 G/DL (ref 6.3–8)
WBC # BLD: 5.1 K/UL (ref 4.8–10.8)

## 2021-03-15 NOTE — PROGRESS NOTES
Re-Assessment      Patient ID:   Dakota Blandon  Date:   3/15/2021      Client ID:  [id number]    [address] [facility]   [address] [facility] (home)     Family/House:    [] Partner  [] Children  [] Other -     Mental Health:   Hx of MH    Substance Abuse:   NA  Social History     Socioeconomic History    Marital status: Single     Spouse name: Not on file    Number of children: Not on file    Years of education: Not on file    Highest education level: Not on file   Occupational History    Not on file   Social Needs    Financial resource strain: Not on file    Food insecurity     Worry: Not on file     Inability: Not on file    Transportation needs     Medical: Not on file     Non-medical: Not on file   Tobacco Use    Smoking status: Current Every Day Smoker     Packs/day: 0.25     Types: Cigarettes    Smokeless tobacco: Never Used    Tobacco comment: cut down to 4 cig. /day   Substance and Sexual Activity    Alcohol use: No    Drug use: No    Sexual activity: Yes     Partners: Male   Lifestyle    Physical activity     Days per week: Not on file     Minutes per session: Not on file    Stress: Not on file   Relationships    Social connections     Talks on phone: Not on file     Gets together: Not on file     Attends Jew service: Not on file     Active member of club or organization: Not on file     Attends meetings of clubs or organizations: Not on file     Relationship status: Not on file    Intimate partner violence     Fear of current or ex partner: Not on file     Emotionally abused: Not on file     Physically abused: Not on file     Forced sexual activity: Not on file   Other Topics Concern    Not on file   Social History Narrative    Not on file       Housing:   home    Health/Healthcare:  HIV Stable   Physician Visit:  Dr. Sun Bobby 9/20    Dental Visit:  [facility] 2.21    CD4:   Lab Results   Component Value Date    LABABSO 530 08/31/2020       Viral Load:  Lab Results

## 2021-03-16 LAB
ABSOLUTE CD 4 HELPER: 375 CELLS/UL (ref 430–1800)
CD4 % HELPER T CELL: 20 % (ref 32–64)
LYMPHOCYTE SUBSET PANEL 2 INFO: ABNORMAL

## 2021-03-18 ENCOUNTER — VIRTUAL VISIT (OUTPATIENT)
Dept: INFECTIOUS DISEASES | Age: 36
End: 2021-03-18
Payer: COMMERCIAL

## 2021-03-18 DIAGNOSIS — Z71.89 COUNSELING ON HEALTH PROMOTION AND DISEASE PREVENTION: ICD-10-CM

## 2021-03-18 DIAGNOSIS — K02.9 DENTAL CARIES: ICD-10-CM

## 2021-03-18 DIAGNOSIS — B20 HUMAN IMMUNODEFICIENCY VIRUS (HCC): Primary | ICD-10-CM

## 2021-03-18 LAB
HIV-1 QNT LOG, IU/ML: 3 LOG CPY/ML
HIV-1 QNT, IU/ML: ABNORMAL
INTERPRETATION: DETECTED

## 2021-03-18 PROCEDURE — 99214 OFFICE O/P EST MOD 30 MIN: CPT | Performed by: INTERNAL MEDICINE

## 2021-03-18 NOTE — PROGRESS NOTES
including possible esophageal thrush, overall malaise, URI with SOB. On chart review dating back to 2014, had an abnormal Echo with \"ischemia\" noted althought he EF noted to be normal at the time. + hx of recurrent pneumothorax and blebs with a RML lung nodule <1cm. He has a hx of VATS as well. There is also a positive rheumatoid factor from 2014 that was never followed. + hx of reynauds and rash on dorsal hand L hand.    + weight loss >30lbs over 8 months.   + cigarette use, no illicit drugs  No alcohol. Has been in pain management in the past with Dr Brando Avila due to persistent back pain from neck to lumbar region, which he thought was from sleeping on the couch, but seems to be persistent.    ~20 partners over the course of his lifetime, all male. 1 female initially. No TB exposure that he knows of. Likes to travel - has been to [place] this past February and [facility] in the past, [facility] as a teenager. + dogs at home. Works as a manager at [employer]. Has another partner outside of the relationship - that person is on truvada Erven Edinson)  Current partner - David Osorio. No other hx of STDs in the past.   Has been in [facility] a day or two.      Support system: mom and grandma and siblings - only the brother and mother know of his status. In an emergency contact David Osorio or his mother. Both have full disclosure. In case David Osorio can't be reached then can talk to his mom.  -------------------------------------------------------------------------------------------------    Had a stretch a month ago where he missed 1 + week of medications. Lately has been better about compliance. Labs ok and reviewed with patient. Poor dentition, now with tooth infection/ broken teeth - needs dental appointment    Complains of having \"allergies\" and would like a referral  Has not seen pulmonology despite his hx of chronic lung issues. All 14 systems reviewed with patient and are negative other than as stated above.     Past Medical History  Past Medical History:   Diagnosis Date    ADHD     AIDS (acquired immune deficiency syndrome) (HCC)     Anemia     Asthma     Chronic back pain     COPD (chronic obstructive pulmonary disease) (HCC)     Ischemic heart disease     Lung disease     Pneumothorax July 17, 2014    right side    Smoker     Vitamin D deficiency        Past Surgical History  Past Surgical History:   Procedure Laterality Date    MD EGD TRANSORAL BIOPSY SINGLE/MULTIPLE N/A 10/30/2018    EGD performed by Leonides Lira MD at [place] [address]  7/17/14    Right VATS with stapling of apical blebs and parietal pleurectomy       Allergies  Allergies   Allergen Reactions    Tramadol Other (See Comments)     Does not remember what the reaction was to tramadol    Codeine Itching    Ketorolac Rash    Pcn [Penicillins] Nausea Only     PCN    Toradol [Ketorolac Tromethamine] Nausea Only       Family History  Family History   Problem Relation Age of Onset    Hypertension Mother      Physical Exam    Since we cannot conduct an in-person exam, the following were addressed with the patient to the best of my capability via virtual visit:   Patient does not perceive any new visual deficits  No diaphoresis or flushing in the face  Patient is able to flex and extend her neck with ease. Patient can inhale and exhale without any difficulty and chest seems to be expanding symmetrically. No conversational dyspnea. Patient does not feel any palpitations   Patient's  abdomen is not protruberant beyond their normal size. No new swelling of their joints.   No observed neurological changes or slurred speech when speaking to the patient    No new skin rash or ulcers  Very poor dentition      Chemistry        Component Value Date/Time     03/15/2021 1357    K 3.4 03/15/2021 1357     03/15/2021 1357    CO2 28 03/15/2021 1357    BUN 21 (H) 03/15/2021 1357    CREATININE 1.03 03/15/2021 1357        Component Value and to the best of my ability.         Maxine Weller

## 2021-03-19 ENCOUNTER — TELEPHONE (OUTPATIENT)
Dept: INFECTIOUS DISEASES | Age: 36
End: 2021-03-19

## 2021-03-19 DIAGNOSIS — J43.9 BLEB, LUNG (HCC): ICD-10-CM

## 2021-03-19 DIAGNOSIS — B20 HIV INFECTION, UNSPECIFIED SYMPTOM STATUS (HCC): ICD-10-CM

## 2021-03-19 DIAGNOSIS — R09.81 SINUS CONGESTION: Primary | ICD-10-CM

## 2021-03-19 DIAGNOSIS — R06.7 SNEEZING: ICD-10-CM

## 2021-03-19 DIAGNOSIS — J93.9 RECURRENT PNEUMOTHORAX: ICD-10-CM

## 2021-03-19 NOTE — TELEPHONE ENCOUNTER
Call placed to pt as V/V follow up. Reviewed yesterdays apt with Dr. Colletta Peals. New orders reviewed-- discussed referral to Pulmonolgy and allergy. Reviewed doctors and all office information review. Notified pt referral faxed to Dr Anahi Granados office. Discussed compliance --- has been doing better. Not missing as much. Reinforced 100% compliance. Will be call dentist today. He is not working today. Pt has no questions regarding apt.       4 month follow up able to be scheduled. Discussed the need for lab work to be completed 1 week prior to next apt. Denies any other needs or acute issues that need doctor attention.

## 2021-03-22 ENCOUNTER — TELEPHONE (OUTPATIENT)
Dept: INFECTIOUS DISEASES | Age: 36
End: 2021-03-22

## 2021-03-22 NOTE — TELEPHONE ENCOUNTER
Received call from  Liliana Lisa with [address]. Stated ODAP application is to early. Can not be processed until 21 earliest. Pt will need to provide new updated income at that time. ODAP does not  until 21      notified  Pt updated as well. He verbalized understanding and will call with questions.

## 2021-04-23 ENCOUNTER — TELEPHONE (OUTPATIENT)
Dept: INFECTIOUS DISEASES | Age: 36
End: 2021-04-23

## 2021-06-23 ENCOUNTER — TELEPHONE (OUTPATIENT)
Dept: INFECTIOUS DISEASES | Age: 36
End: 2021-06-23

## 2021-06-23 NOTE — TELEPHONE ENCOUNTER
Call placed to pt regarding need to reschedule as MD will be out of office. Discussed Doctor schedule changes due to COVID 19. Pt verbalized understanding. Denies any health concerns at this time. Pt was reminded to have labs completed at least 1 week prior to appointment. Was able to reschedule pt with out complaints or concerns. Discussed with pt to call office if problems occur before appt or with any further questions/concerns. Pt verbalized understanding. Denies any needs at this time. Denies any issues that need Doctor attention.

## 2021-06-25 ENCOUNTER — TELEPHONE (OUTPATIENT)
Dept: INFECTIOUS DISEASES | Age: 36
End: 2021-06-25

## 2021-07-13 DIAGNOSIS — B20 HIV INFECTION, UNSPECIFIED SYMPTOM STATUS (HCC): ICD-10-CM

## 2021-07-13 LAB
ALBUMIN SERPL-MCNC: 4.7 G/DL (ref 3.5–4.6)
ALP BLD-CCNC: 55 U/L (ref 35–104)
ALT SERPL-CCNC: 22 U/L (ref 0–41)
ANION GAP SERPL CALCULATED.3IONS-SCNC: 12 MEQ/L (ref 9–15)
AST SERPL-CCNC: 20 U/L (ref 0–40)
BILIRUB SERPL-MCNC: <0.2 MG/DL (ref 0.2–0.7)
BUN BLDV-MCNC: 16 MG/DL (ref 6–20)
CALCIUM SERPL-MCNC: 9.4 MG/DL (ref 8.5–9.9)
CHLORIDE BLD-SCNC: 103 MEQ/L (ref 95–107)
CO2: 23 MEQ/L (ref 20–31)
CREAT SERPL-MCNC: 1.07 MG/DL (ref 0.7–1.2)
GFR AFRICAN AMERICAN: >60
GFR NON-AFRICAN AMERICAN: >60
GLOBULIN: 2.6 G/DL (ref 2.3–3.5)
GLUCOSE BLD-MCNC: 89 MG/DL (ref 70–99)
HCT VFR BLD CALC: 42.6 % (ref 42–52)
HEMOGLOBIN: 14.6 G/DL (ref 14–18)
HEPATITIS C ANTIBODY INTERPRETATION: NORMAL
MCH RBC QN AUTO: 32.1 PG (ref 27–31.3)
MCHC RBC AUTO-ENTMCNC: 34.2 % (ref 33–37)
MCV RBC AUTO: 93.8 FL (ref 80–100)
PDW BLD-RTO: 14.3 % (ref 11.5–14.5)
PLATELET # BLD: 225 K/UL (ref 130–400)
POTASSIUM SERPL-SCNC: 3.8 MEQ/L (ref 3.4–4.9)
RBC # BLD: 4.54 M/UL (ref 4.7–6.1)
SODIUM BLD-SCNC: 138 MEQ/L (ref 135–144)
TOTAL PROTEIN: 7.3 G/DL (ref 6.3–8)
VITAMIN D 25-HYDROXY: 23.8 NG/ML (ref 30–100)
WBC # BLD: 6.6 K/UL (ref 4.8–10.8)

## 2021-07-14 LAB — RPR: NORMAL

## 2021-07-15 ENCOUNTER — VIRTUAL VISIT (OUTPATIENT)
Dept: INFECTIOUS DISEASES | Age: 36
End: 2021-07-15
Payer: COMMERCIAL

## 2021-07-15 ENCOUNTER — TELEPHONE (OUTPATIENT)
Dept: INFECTIOUS DISEASES | Age: 36
End: 2021-07-15

## 2021-07-15 DIAGNOSIS — K02.9 DENTAL CARIES: ICD-10-CM

## 2021-07-15 DIAGNOSIS — B20 HUMAN IMMUNODEFICIENCY VIRUS (HCC): Primary | ICD-10-CM

## 2021-07-15 LAB
ABSOLUTE CD 4 HELPER: 497 CELLS/UL (ref 430–1800)
CD4 % HELPER T CELL: 25 % (ref 32–64)
LYMPHOCYTE SUBSET PANEL 2 INFO: ABNORMAL
QUANTI TB GOLD PLUS: NEGATIVE
QUANTI TB1 MINUS NIL: 0 IU/ML (ref 0–0.34)
QUANTI TB2 MINUS NIL: 0 IU/ML (ref 0–0.34)
QUANTIFERON MITOGEN: >10 IU/ML
QUANTIFERON NIL: 0.02 IU/ML

## 2021-07-15 PROCEDURE — 99214 OFFICE O/P EST MOD 30 MIN: CPT | Performed by: INTERNAL MEDICINE

## 2021-07-15 NOTE — PROGRESS NOTES
7/15/2021    TELEHEALTH EVALUATION -- Audio/Visual (During MIVJQ-63 public health emergency)      Crystal Delacruz (:  1985) has requested an audio/video evaluation for the following concern(s):    HIV    Due to the COVID-19 outbreak, patient's office visit was converted to a virtual visit. Patient was contacted and agreed to proceed with a virtual visit with me via Doxy. me with my location at the office. Patient reports that they are located at home. The risks and benefits of converting to a virtual visit were discussed in light of the current infectious disease epidemic. Services were provided through an audio/video synchronous discussion virtually to substitute for in person clinic visit. THIS virtual visit was conducted via interactive/real-time audio/video. Due to this being a TeleHealth encounter, evaluation of the following organ systems is limited: Vitals/Constitutional/EENT/Resp/CV/GI//MS/Neuro/Skin/Heme-Lymph-Imm.}    Pursuant to the emergency declaration under the [address] [place] [address], [facility] waiver authority and the [employer] Act, this Virtual Visit was conducted, with patient's consent, to reduce the patient's risk of exposure to COVID-19 and provide care for the patient. Patient Name: Crystal Delacruz  YOB: 1985  Patient Sex: male  Medical Record Number: [id number]    Background:  Patient with HIV + MSM with a longterm HIV+ partner since . Partner's regimen is unknown. Last prior negative HIV blood test was in , although he says he got a mouth swab in 2016 and was told it was negative. Presented to my office with mario CD4 <50, 8%, viral load is 160,000. G6PD negative, RPR negative, HLA negative, serum quantiferon negative, chlam/gonorrhea negative, Hepatitis negative.    Genotype with resistance to stavudine and zidovudine, otherwise negative   Presented with + thrush, including possible esophageal thrush, overall malaise, URI with SOB. On chart review dating back to 2014, had an abnormal Echo with \"ischemia\" noted althought he EF noted to be normal at the time. + hx of recurrent pneumothorax and blebs with a RML lung nodule <1cm. He has a hx of VATS as well. There is also a positive rheumatoid factor from 2014 that was never followed. + hx of reynauds and rash on dorsal hand L hand.    + weight loss >30lbs over 8 months.   + cigarette use, no illicit drugs  No alcohol. Has been in pain management in the past with Dr Fernie Watts due to persistent back pain from neck to lumbar region, which he thought was from sleeping on the couch, but seems to be persistent.    ~20 partners over the course of his lifetime, all male. 1 female initially. No TB exposure that he knows of. Likes to travel - has been to [place] this past February and [facility] in the past, [facility] as a teenager. + dogs at home. Works as a manager at [employer]. Has another partner outside of the relationship - that person is on truvada Jermain Lout)  Current partner - abi Billings. ( he is also COVID vaccinated )   No other hx of STDs in the past.   Has been in [facility] a day or two.      Support system: mom and grandma and siblings - only the brother and mother know of his status. In an emergency contact Tracey Billings or his mother. Both have full disclosure. In case Tracey Billings can't be reached then can talk to his mom. COVID vaccinated - [employer] May 14th was the final dose.    -------------------------------------------------------------------------------------------------    Lately has been better about compliance with medications. Labs ok and reviewed with patient. Poor dentition, now with tooth infection/ broken teeth - needs dental appointment    Complains of having \"allergies\" and would like a referral  Has not seen pulmonology despite his hx of chronic lung issues.          All 14 systems reviewed with patient and are negative other than as stated above. Past Medical History  Past Medical History:   Diagnosis Date    ADHD     AIDS (acquired immune deficiency syndrome) (HCC)     Anemia     Asthma     Chronic back pain     COPD (chronic obstructive pulmonary disease) (HCC)     Ischemic heart disease     Lung disease     Pneumothorax July 17, 2014    right side    Smoker     Vitamin D deficiency        Past Surgical History  Past Surgical History:   Procedure Laterality Date    SD EGD TRANSORAL BIOPSY SINGLE/MULTIPLE N/A 10/30/2018    EGD performed by Hudson Manzano MD at [facility]  7/17/14    Right VATS with stapling of apical blebs and parietal pleurectomy       Allergies  Allergies   Allergen Reactions    Tramadol Other (See Comments)     Does not remember what the reaction was to tramadol    Codeine Itching    Ketorolac Rash    Pcn [Penicillins] Nausea Only     PCN    Toradol [Ketorolac Tromethamine] Nausea Only       Family History  Family History   Problem Relation Age of Onset    Hypertension Mother      Physical Exam    Since we cannot conduct an in-person exam, the following were addressed with the patient to the best of my capability via virtual visit:   Patient does not perceive any new visual deficits  No diaphoresis or flushing in the face  Patient is able to flex and extend her neck with ease. Patient can inhale and exhale without any difficulty and chest seems to be expanding symmetrically. No conversational dyspnea. Patient does not feel any palpitations   Patient's  abdomen is not protruberant beyond their normal size. No new swelling of their joints.   No observed neurological changes or slurred speech when speaking to the patient    No new skin rash or ulcers  Very poor dentition      Chemistry        Component Value Date/Time     07/13/2021 1559    K 3.8 07/13/2021 1559     07/13/2021 1559    CO2 23 07/13/2021 1559    BUN 16 07/13/2021 1559    CREATININE 1.07 07/13/2021 1559 Component Value Date/Time    CALCIUM 9.4 07/13/2021 1559    ALKPHOS 55 07/13/2021 1559    AST 20 07/13/2021 1559    ALT 22 07/13/2021 1559    BILITOT <0.2 07/13/2021 1559        Patient has been informed of the importance of protection during sexual encounters which include, but are not limited to vaginal intercourse, anal intercourse, and oral sex. Patient has also been made aware that it is a felony in the [address] to engage in a sexual encounter without first disclosing HIV status to partner. Assessment/Plan:  HIV infection - some labs are still pending. Encouraged compliance. Hx of ischemic heart disease  Recurrent pneumothorax history with blebs   Persistent back pain - stable now. Hx of broken teeth/poor dentition - teeth keep breaking. Plans before next visit:   Dental appointment/may need oral surgeon  Eye appointment  Pulm and allergy  PCP follow up, especially due to cardiac hx. Flu shot in the fall. Follow up in 6 months. Time spent today in combination of reviewing patient's chart, medications, labs, pictures when pertinent, provider communication as necessary, counseling patient, care/coordination not otherwise reported here, and patient face to face virtual visit >30 min.   >50% of that time spent counseling and coordination of patient care  Patient was also appropriately counseled on preventive measures such as vaccinations, the importance of annual exam with their PCP, and the importance of health maintenance by dietary and exercise regimens. All questions were answered from an ID perspective and to the best of my ability.         Stacey Castro, DO

## 2021-07-15 NOTE — TELEPHONE ENCOUNTER
Call placed to pt as V/V follow up. Pt states apt went good. New orders reviewed--   Discussed follow up with pulm. He states he has been busy with work. Reviewed importance. He is available Tuesday or Thursday after 2 pm or last option fridays  Requested this RN call to help schedule. Noted he has scheduled apt multiple time and no showed. Spoke with Pulmonology was able to schedule 9.9.21 at 3 pm    Pt requested help with finding a new dental office. States he will not go back to [facility] due to long wait. [facility] has tried [facility] but did not like it there either. States he was told an estimate for theeth removal of 20182$ we discussed multiple other dental offices. He is agreeable to try Dr Justen Moe     Dentist-- apt scheduled for 7.22.21 at 2 pm [facility] [place] [address] [address], [address] [place] [address]- spoke with Manisha Dunbar. insurance verified. [phone]    Vision--- apt scheduled for 9.3.21 115 pm  at [facility]- [phone]   - verified pt insurance was covered spoke with Alferd Flow    ALL of the above apts we reviewed with pt and all apt information was reviewed. Requested he call if unable to make apts and to reschedule. Discussed compliance --- we discussed this at length. Still missing occasionally. \"better this month\"     Pt has no questions regarding apt.     4 month follow up able to be scheduled. Discussed the need for lab work to be completed 1 week prior to next apt. Denies any other needs or acute issues that need doctor attention.

## 2021-07-16 LAB
C. TRACHOMATIS DNA ,URINE: NEGATIVE
N. GONORRHOEAE DNA, URINE: NEGATIVE

## 2021-07-18 LAB
HIV-1 QNT LOG, IU/ML: <1.47 LOG CPY/ML
HIV-1 QNT, IU/ML: ABNORMAL CPY/ML
INTERPRETATION: DETECTED

## 2021-07-19 ENCOUNTER — TELEPHONE (OUTPATIENT)
Dept: INFECTIOUS DISEASES | Age: 36
End: 2021-07-19

## 2021-08-13 ENCOUNTER — TELEPHONE (OUTPATIENT)
Dept: INFECTIOUS DISEASES | Age: 36
End: 2021-08-13

## 2021-08-25 ENCOUNTER — NURSE ONLY (OUTPATIENT)
Dept: INFECTIOUS DISEASES | Age: 36
End: 2021-08-25

## 2021-08-25 NOTE — PROGRESS NOTES
Re-Assessment      Patient ID:   Sera Holley  Date:   8/25/2021      Client ID:  [id number]    [address] [place], [place] [id number] (home)     Family/House:    [] Partner  [] Children  [] Other -     Mental Health:   Hx of Depression    Substance Abuse:   NA  Social History     Socioeconomic History    Marital status: Single     Spouse name: Not on file    Number of children: Not on file    Years of education: Not on file    Highest education level: Not on file   Occupational History    Not on file   Tobacco Use    Smoking status: Current Every Day Smoker     Packs/day: 0.25     Types: Cigarettes    Smokeless tobacco: Never Used    Tobacco comment: cut down to 4 cig. /day   Vaping Use    Vaping Use: Never used   Substance and Sexual Activity    Alcohol use: No    Drug use: No    Sexual activity: Yes     Partners: Male   Other Topics Concern    Not on file   Social History Narrative    Not on file     Social Determinants of Health     Financial Resource Strain:     Difficulty of Paying Living Expenses:    Food Insecurity:     Worried About Running Out of Food in the Last Year:     Ran Out of Food in the Last Year:    Transportation Needs:     Lack of Transportation (Medical):      Lack of Transportation (Non-Medical):    Physical Activity:     Days of Exercise per Week:     Minutes of Exercise per Session:    Stress:     Feeling of Stress :    Social Connections:     Frequency of Communication with Friends and Family:     Frequency of Social Gatherings with Friends and Family:     Attends Evangelical Services:     Active Member of Clubs or Organizations:     Attends Club or Organization Meetings:     Marital Status:    Intimate Partner Violence:     Fear of Current or Ex-Partner:     Emotionally Abused:     Physically Abused:     Sexually Abused:        Housing:   home    Health/Healthcare:  HIV Stable   Physician Visit:  Dr. Grecia Carpio 7/21   Dental Visit:  Dr. Андрей Sanchez 7/21    CD4:   Lab Results   Component Value Date    LABABSO 497 07/13/2021       Viral Load:  Lab Results   Component Value Date    [id number] 160,000 10/17/2018    [id number] <1.47 07/13/2021       Medical Insurance:  Payor: Crissy Gamble / Plan: Vivian Ortiz STANLEY / Product Type: *No Product type* /    OHDAP/HIPSCA:  OHDAP   Renewal Date:  8/25/21    Income:    Employment    Legal Issues:    NA    Emergency Contact:   Extended Emergency Contact Information  Primary Emergency Contact: Jagruti Foote [address] [place] [address] Phone: [phone]  Relation: Domestic Partner  Secondary Emergency Contact: Monae Holland  Address: [address] , [address] [place] [address] Phone: [phone]  Relation: Parent  Pt presented for scheduled appointment with CM. CM assisted with OHDAP Renewal Application for assistance with premium payment and medication co-pays. CM completed Annual Review, assessed RW Eligibility for services. Pt has insurance via [employer], works full-time. CM reviewed Grievance Policy and Sliding Scale Fee with pt. CM completed PSA and SA/MH Assessments. Pt has hx of MH, no hx of SA. CM updated ISP, pt signed in agreement with POC. Pt remains compliant with care, VL from 7/21 is <30. Last dental visit was 7/22. Pt is sexually active, is aware of U=U, undetectable equals un-transmittable. CM provided socialization, no specific issues discussed. Pt informed CM that Covid Vaccination series completed in May 2021. CM provided support and encouragement. CM provided gas cards via [facility] to facilitate transport. CM assessed need, provisions as last resort. CM will follow up with pt as needed.

## 2021-08-30 ENCOUNTER — TELEPHONE (OUTPATIENT)
Dept: INFECTIOUS DISEASES | Age: 36
End: 2021-08-30

## 2021-08-30 NOTE — TELEPHONE ENCOUNTER
CM called pt, left message that CM was informed that OHDAP Application submission was \"too\"early\". Pt not due until 10/15/21. CM informed pt that updated pay stubs will be needed at time of submission. CM will follow up with pt as needed.

## 2021-09-09 ENCOUNTER — TELEPHONE (OUTPATIENT)
Dept: INFECTIOUS DISEASES | Age: 36
End: 2021-09-09

## 2021-09-09 ENCOUNTER — OFFICE VISIT (OUTPATIENT)
Dept: PULMONOLOGY | Age: 36
End: 2021-09-09
Payer: COMMERCIAL

## 2021-09-09 VITALS
WEIGHT: 169 LBS | TEMPERATURE: 98.1 F | HEIGHT: 73 IN | BODY MASS INDEX: 22.4 KG/M2 | HEART RATE: 97 BPM | OXYGEN SATURATION: 98 % | SYSTOLIC BLOOD PRESSURE: 138 MMHG | DIASTOLIC BLOOD PRESSURE: 88 MMHG

## 2021-09-09 DIAGNOSIS — Z72.0 TOBACCO ABUSE: ICD-10-CM

## 2021-09-09 DIAGNOSIS — B20 HIV INFECTION, UNSPECIFIED SYMPTOM STATUS (HCC): ICD-10-CM

## 2021-09-09 DIAGNOSIS — J43.9 BULLOUS EMPHYSEMA (HCC): ICD-10-CM

## 2021-09-09 DIAGNOSIS — J44.9 CHRONIC OBSTRUCTIVE PULMONARY DISEASE, UNSPECIFIED COPD TYPE (HCC): Primary | ICD-10-CM

## 2021-09-09 DIAGNOSIS — J93.0 PNEUMOTHORAX, SPONTANEOUS, TENSION: ICD-10-CM

## 2021-09-09 PROCEDURE — 99204 OFFICE O/P NEW MOD 45 MIN: CPT | Performed by: INTERNAL MEDICINE

## 2021-09-09 ASSESSMENT — ENCOUNTER SYMPTOMS
SORE THROAT: 0
RHINORRHEA: 0
DIARRHEA: 0
NAUSEA: 0
SHORTNESS OF BREATH: 1
COUGH: 0
ABDOMINAL PAIN: 0
VOICE CHANGE: 0
CHEST TIGHTNESS: 0
VOMITING: 0
EYE ITCHING: 0
WHEEZING: 1

## 2021-09-09 NOTE — PROGRESS NOTES
Subjective:     Baltazar Graham is a 39 y.o. male who complains today of:     Chief Complaint   Patient presents with    New Patient     SOB  night time is worse    COPD       HPI  He has rt. pneumothorax in 2014 and had chest tube  And pleurodesis  And then he had left side pneumothorax almost same time while he was in hospital for right-sided pneumothorax and required chest tube on left side also. Last Ct chest in 2018. He is smoking less than 1/2 PPD of cigarette  C/o shortness of breath , worse with exertion and hot humid weather . Occasional Wheezing. No Cough. No Hemoptysis. No Chest tightness. No Chest pain with radiation  or pleuritic pain. No  leg edema. No orthopnea. No Fever or chills. No Rhinorrhea and postnasal drip. He was on stiolto respimat  And ventolin HFA . Currently he is not on any this since he lost his insurance but now he has back. He use O2 from his grand ma if needed.      Allergies:  Tramadol, Codeine, Ketorolac, and Toradol [ketorolac tromethamine]  Past Medical History:   Diagnosis Date    ADHD     AIDS (acquired immune deficiency syndrome) (HCC)     Anemia     Asthma     Chronic back pain     COPD (chronic obstructive pulmonary disease) (HCC)     Ischemic heart disease     Lung disease     Pneumothorax July 17, 2014    right side    Smoker     Vitamin D deficiency      Past Surgical History:   Procedure Laterality Date    [place] EGD TRANSORAL BIOPSY SINGLE/MULTIPLE N/A 10/30/2018    EGD performed by Cindy Araiza MD at [place] [address]  7/17/14    Right VATS with stapling of apical blebs and parietal pleurectomy     Family History   Problem Relation Age of Onset    Hypertension Mother      Social History     Socioeconomic History    Marital status: Single     Spouse name: Not on file    Number of children: Not on file    Years of education: Not on file    Highest education level: Not on file   Occupational History    Not on file Tobacco Use    Smoking status: Current Every Day Smoker     Packs/day: 0.25     Types: Cigarettes    Smokeless tobacco: Never Used    Tobacco comment: cut down to 4 cig. /day   Vaping Use    Vaping Use: Never used   Substance and Sexual Activity    Alcohol use: No    Drug use: No    Sexual activity: Yes     Partners: Male   Other Topics Concern    Not on file   Social History Narrative    Not on file     Social Determinants of Health     Financial Resource Strain:     Difficulty of Paying Living Expenses:    Food Insecurity:     Worried About Running Out of Food in the Last Year:     Ran Out of Food in the Last Year:    Transportation Needs:     Lack of Transportation (Medical):  Lack of Transportation (Non-Medical):    Physical Activity:     Days of Exercise per Week:     Minutes of Exercise per Session:    Stress:     Feeling of Stress :    Social Connections:     Frequency of Communication with Friends and Family:     Frequency of Social Gatherings with Friends and Family:     Attends [employer] Services:     Active Member of Clubs or Organizations:     Attends Club or Organization Meetings:     Marital Status:    Intimate Partner Violence:     Fear of Current or Ex-Partner:     Emotionally Abused:     Physically Abused:     Sexually Abused:          Review of Systems   Constitutional: Negative for chills, diaphoresis, fatigue and fever. HENT: Negative for congestion, mouth sores, nosebleeds, postnasal drip, rhinorrhea, sneezing, sore throat and voice change. Eyes: Negative for itching and visual disturbance. Respiratory: Positive for shortness of breath and wheezing. Negative for cough and chest tightness. Cardiovascular: Negative. Negative for chest pain, palpitations and leg swelling. Gastrointestinal: Negative for abdominal pain, diarrhea, nausea and vomiting. Genitourinary: Negative for difficulty urinating and hematuria.    Musculoskeletal: Negative for arthralgias, joint swelling and myalgias. Skin: Negative for rash. Allergic/Immunologic: Negative for environmental allergies. Neurological: Negative for dizziness, tremors, weakness and headaches. Psychiatric/Behavioral: Negative for behavioral problems and sleep disturbance.         :     Vitals:    09/09/21 1506   BP: 138/88   Pulse: 97   Temp: 98.1 °F (36.7 °C)   SpO2: 98%   Weight: 169 lb (76.7 kg)   Height: 6' 1\" (1.854 m)     Wt Readings from Last 3 Encounters:   09/09/21 169 lb (76.7 kg)   08/12/20 180 lb (81.6 kg)   02/26/20 171 lb (77.6 kg)         Physical Exam  Constitutional:       Appearance: He is well-developed. HENT:      Head: Normocephalic and atraumatic. Nose: Nose normal.   Eyes:      Conjunctiva/sclera: Conjunctivae normal.      Pupils: Pupils are equal, round, and reactive to light. Neck:      Thyroid: No thyromegaly. Vascular: No JVD. Trachea: No tracheal deviation. Cardiovascular:      Rate and Rhythm: Normal rate and regular rhythm. Heart sounds: No murmur heard. No friction rub. No gallop. Pulmonary:      Effort: Pulmonary effort is normal. No respiratory distress. Breath sounds: Normal breath sounds. No wheezing or rales. Comments: diminished Breath sound bilaterally. Chest:      Chest wall: No tenderness. Abdominal:      General: There is no distension. Musculoskeletal:         General: Normal range of motion. Lymphadenopathy:      Cervical: No cervical adenopathy. Skin:     General: Skin is warm and dry. Findings: No rash. Neurological:      Mental Status: He is alert and oriented to person, place, and time. Cranial Nerves: No cranial nerve deficit.    Psychiatric:         Behavior: Behavior normal.         Current Outpatient Medications   Medication Sig Dispense Refill    ibuprofen (IBU) 800 MG tablet Take 1 tablet by mouth every 8 hours as needed for Pain 20 tablet 0    sildenafil (VIAGRA) 25 MG tablet TAKE 1 TABLET BY MOUTH 1 HOUR PRIOR TO SEXUAL ACTIVITY      TRIUMEQ 600- MG TABS       vitamin B-1 100 MG tablet Take 1 tablet by mouth daily 30 tablet 3    vitamin D (CHOLECALCIFEROL) 1000 UNIT TABS tablet Take 1,000 Units by mouth daily      folic acid-pyridoxine-cyanocobalamine (FOLTX) 2.5-25-1 MG TABS tablet Take 1 tablet by mouth daily      lisdexamfetamine (VYVANSE) 70 MG capsule Take 70 mg by mouth daily      cephALEXin (KEFLEX) 500 MG capsule take 1 capsule by mouth three times a day (Patient not taking: Reported on 9/9/2021)      Tiotropium Bromide-Olodaterol (STIOLTO RESPIMAT) 2.5-2.5 MCG/ACT AERS Inhale into the lungs (Patient not taking: Reported on 9/9/2021)      albuterol (PROVENTIL) (2.5 MG/3ML) 0.083% nebulizer solution use 1 (ONE) vial (3 (THREE) mls) via NEBULIZER EVERY FOUR HOURS AS NEEDED for FOR WHEEZING (Patient not taking: Reported on 9/9/2021)  0    VENTOLIN  (90 BASE) MCG/ACT inhaler inhale 1-2 puffs BY MOUTH EVERY FOUR HOURS AS NEEDED for FOR WHEEZING (Patient not taking: Reported on 9/9/2021)  0    SUMAtriptan (IMITREX) 100 MG tablet  (Patient not taking: Reported on 9/9/2021)       No current facility-administered medications for this visit. Results for orders placed during the hospital encounter of 10/26/18    XR CHEST STANDARD (2 VW)    Narrative  XR CHEST (2 VW): 10/26/2018    CLINICAL HISTORY: Chest pain and shortness of breath. COMPARISON: 7/12/2017. Upright PA and lateral radiographs of the chest were obtained. FINDINGS:    Mild hyperinflation unchanged, with mild scarring and postoperative changes of the medial right lung apex, and a 5 mm granuloma in the mid to upper lung field. .    There are no developing infiltrates, pleural effusion, cardiomegaly, vascular congestion, pneumothorax, or displaced fractures identified. Impression  NO EVIDENCE OF ACTIVE CARDIOPULMONARY DISEASE.       Results for orders placed during the hospital encounter of 07/12/17    XR CHEST STANDARD TWO VW    Narrative  EXAMINATION: XR CHEST STANDARD TWO VW    CLINICAL HISTORY: COPD, follow-up    COMPARISONS: May 24, 2008    FINDINGS:    Two views of the chest are submitted. The cardiac silhouette is of normal size configuration. The mediastinum is unremarkable. Pulmonary vascular unremarkable. Right sided trachea. No focal infiltrates. No effusions. No Pneumothoraces. Impression  NO ACUTE ACTIVE CARDIOPULMONARY PROCESS      Results for orders placed during the hospital encounter of 08/26/14    XR CHEST PA AND LATERAL    Narrative  ** FINAL **  Procedure:  VOB  Aug 26 2014 11:04AM [id number]  CHEST PA AND LATERAL  Reason for Exam:  ^Spontaneous tension pneumothorax  Wilburt Sparrow history of  surgery to other organs    FULL RESULT:   Clinical problem:  Pneumothorax status post VATS    Procedure:  PA and lateral chest.    Comparison: 1/23/2014, 7/20/2014 and CT of 7/16/2014. Findings: Is not tiny metallic suture line in the right lung apex. Chest  tubes been removed. Is no evidence of residual pneumothorax, active  pulmonary infiltration nor pleural fluid. There is a calcified nodule in  the right upper lung field overlying the second right anterior  intercostal space. The cardio mediastinal silhouette is within normal  limits. Impression  1. Surgical suture line right lung apex a  2. Calcified granuloma right upper lobe. 3. No evidence of pneumothorax or acute change    Report Electronically signed by Brandie Paz M.D. on 8/27/2014 9:21 AM  Transcribed by: Travis Ramirez on Aug 27 2014  9:23A  Read by:  Randi Cruz M.D.  [id number] on Aug 27 2014  9:23A  Electronically Signed by:  DR. Randi Cruz M.D. on:  Aug 27 2014  9:23A    ]  Results for orders placed during the hospital encounter of 10/26/18    CT CHEST WO CONTRAST    Narrative  CT CHEST WO CONTRAST : 10/26/2018    CLINICAL HISTORY: hx of lung nodule, recurrent pneumothorax and current URI with CD4<50 in newly dx HIV Bridgett Lowe COMPARISON: Chest CTA 5/24/2008. TECHNIQUE: Spiral unenhanced images were obtained of the chest without contrast. Routine reconstructions were performed with attention to pulmonary nodules. All CT scans At this facility use dose modulation, iterative reconstruction, and/or weight based dosing when appropriate to reduce radiation dose to as low as reasonably achievable. FINDINGS:    An approximately 6 mm in average dimension calcified granuloma within the lateral inferior aspect of the right upper lobe, and 3 mm granuloma of the central right lung base, are unchanged when compared to the prior study, without significant nodules  identified elsewhere. Minimal postoperative changes of the right lung apex is noted medially. There is minimal probable dependent atelectasis of the posterior inferior lung bases. There are no other significant infiltrates, lymphadenopathy, pleural or pericardial effusions, or incidental findings of concern identified. The limited images of the upper abdomen are noncontributory. Impression  STABLE RIGHT LUNG NODULES. POSTOPERATIVE CHANGES OF THE RIGHT LUNG APEX MEDIALLY. PROBABLE MINIMAL BIBASILAR ATELECTASIS POSTERIORLY. OTHERWISE, NEGATIVE CHEST CT. Assessment/Plan:     1. Chronic obstructive pulmonary disease, unspecified COPD type ([address]  C/o shortness of breath , worse with exertion and hot humid weather . Occasional Wheezing. No Cough. He was on stiolto respimat  And ventolin HFA . Currently he is not on any this since he lost his insurance but now he has back. He use O2 from his grand ma if needed. Will request PFT and decide about bronchodilator therapy. Jenniferlilia Chelseamiranda His room air O2 saturation is 98%    - Full PFT Study With Bronchodilator; Future    2. Pneumothorax, spontaneous,right and left  2014, sp pleuro desis on rt. side  He had rt.  pneumothorax in 2014 and had chest tube  And pleurodesis  And then he had left side pneumothorax almost same time while he was in hospital for right-sided pneumothorax and required chest tube on left side also. Last Ct chest in 2018. 3. HIV infection, unspecified symptom status ([facility] [address] [facility]  He is following with ID, Dr. Tiffanie Diehl    4. Bullous emphysema [facility])  He has bullous emphysema. I will request CT chest for further evaluation  - CT CHEST WO CONTRAST; Future    5. Tobacco abuse  He is advised try to quit smoking. Risks related to smoking explained to the patient. Different ways to help to quit smoking were discussed today. He is smoking less than 1/2 PPD of cigarette      Return in about 4 weeks (around 10/7/2021) for COPD, Ct chest f/u, pft result.       Sarbjit Morales MD

## 2021-09-09 NOTE — TELEPHONE ENCOUNTER
Pt called in with update. His sister and her children all have COVID. Hospitalized. Children are hanging in there but possible admission needed per pt. (lives in [address] [place] [address]) also he states his brother is COVID + (lives in [facility])   provided active listening as pt discussed family concerns. Issue with eye apt. apparently they did not take pt insurance,     Call placed to multiple eye facilities. [facility] verified pt insurance is accepted  [address] [place] [address], [address] [place] [address] [phone]  Apt scheduled for 10.18.21 at 1 pm    Call placed to pt and reviewed apt date, time and location.  He verbalized understanding

## 2021-09-21 ENCOUNTER — TELEPHONE (OUTPATIENT)
Dept: INFECTIOUS DISEASES | Age: 36
End: 2021-09-21

## 2021-09-21 NOTE — TELEPHONE ENCOUNTER
CM called pt, left message requesting [facility]. At time CM assisted  Pt and submitted to OHDAP, it was \"too early\" (8.21) . CM will need to resubmit application with updated information.

## 2021-09-27 ENCOUNTER — TELEPHONE (OUTPATIENT)
Dept: INFECTIOUS DISEASES | Age: 36
End: 2021-09-27

## 2021-10-01 DIAGNOSIS — B20 AIDS (ACQUIRED IMMUNODEFICIENCY SYNDROME), CD4 <=200 (HCC): Primary | ICD-10-CM

## 2021-10-04 ENCOUNTER — TELEPHONE (OUTPATIENT)
Dept: INFECTIOUS DISEASES | Age: 36
End: 2021-10-04

## 2021-10-13 NOTE — TELEPHONE ENCOUNTER
Pt returned call. Unable to make it to lab or apt. Started new job and \"has a lot going on right now\"     R/S to 12/14/21 at 1145 V/V with Dr Carolina Nettles. This works best with pt schedule. Denies other issues. Requested he call if needed.

## 2021-10-20 ENCOUNTER — TELEPHONE (OUTPATIENT)
Dept: INFECTIOUS DISEASES | Age: 36
End: 2021-10-20

## 2021-10-20 NOTE — TELEPHONE ENCOUNTER
Call placed to pt to inquire on upcoming appt set for 10/26/21 with Dr. Lily Pablo, and to remind pt to complete lab work needed for appointment. No answer. Voice message left for return call to office. No personal information was left in message.

## 2021-12-01 ENCOUNTER — TELEPHONE (OUTPATIENT)
Dept: INFECTIOUS DISEASES | Age: 36
End: 2021-12-01

## 2021-12-01 NOTE — TELEPHONE ENCOUNTER
Pt called in with compliant of tooth abses. States painful and swollen. Hard to find dentist with insurance. States he went to urgent care but was turned away because he lost his ID card. Requesting antibiotic. Spoke with Dr Sofie Smith. Attempt to schedule pt with urgent care dental.     Spoke with antonio dhaliwal, [place] [facility],   [address] dentist. They do not except pt insurance at all. Call placed to [facility] program -- spoke with Justo Lewis. earliest appt Is 4/2022. She did recommend pt calls tomorrow at 8 AM. \"There are slots open for emergencies but go fast\"   We did schedule an apt 4.29.2022 at 10 am -- location [address]  Pt is to call [phone] in the morning    Updated Dr Sofie Smith. Ok to order Augmentin 875/125 1 tab BID x 14 days     Pt updated and medication called [facility] local [facility] in [address]. Pt is to call with update.

## 2021-12-07 ENCOUNTER — TELEPHONE (OUTPATIENT)
Dept: INFECTIOUS DISEASES | Age: 36
End: 2021-12-07

## 2021-12-14 ENCOUNTER — TELEPHONE (OUTPATIENT)
Dept: INFECTIOUS DISEASES | Age: 36
End: 2021-12-14

## 2021-12-14 NOTE — TELEPHONE ENCOUNTER
Received message from pt that he needs to cancel apt today due to hospitalization for leg issue.  Requested he call with update and when able to R/S

## 2022-01-10 ENCOUNTER — TELEPHONE (OUTPATIENT)
Dept: INFECTIOUS DISEASES | Age: 37
End: 2022-01-10

## 2022-01-10 NOTE — TELEPHONE ENCOUNTER
Call placed to pt as appt reminder. He  also needs lab work completed. message left as reminder for pt.

## 2022-01-25 ENCOUNTER — TELEPHONE (OUTPATIENT)
Dept: INFECTIOUS DISEASES | Age: 37
End: 2022-01-25

## 2022-02-21 ENCOUNTER — TELEPHONE (OUTPATIENT)
Dept: INFECTIOUS DISEASES | Age: 37
End: 2022-02-21

## 2022-02-21 DIAGNOSIS — B20 AIDS (ACQUIRED IMMUNODEFICIENCY SYNDROME), CD4 <=200 (HCC): Primary | ICD-10-CM

## 2022-02-21 NOTE — TELEPHONE ENCOUNTER
Missed last apt. In need of labs and R/S    Scheduled pt for 3.15.22 V/V with Dr Yoav Howard. Will complete labs 1st week of March. provided active listening as pt discussed recent loss of child bell friend. Recent eye Stye -- feeling and states it looks better. Will follow up with eye or urgent care if worsens. Forgetting meds a lot. [facility]. In 30 days states he has missed \"about half or so\"  Discussed importance at length. Reviewed possibility of resistance with non compliance. Discussed alarm reminder. He started this 2 week. States he has been more compliant.        Otherwise will call if needed

## 2022-03-09 ENCOUNTER — TELEPHONE (OUTPATIENT)
Dept: INFECTIOUS DISEASES | Age: 37
End: 2022-03-09

## 2022-03-09 DIAGNOSIS — B20 AIDS (ACQUIRED IMMUNODEFICIENCY SYNDROME), CD4 <=200 (HCC): ICD-10-CM

## 2022-03-09 LAB
ALBUMIN SERPL-MCNC: 4.7 G/DL (ref 3.5–4.6)
ALP BLD-CCNC: 60 U/L (ref 35–104)
ALT SERPL-CCNC: 16 U/L (ref 0–41)
ANION GAP SERPL CALCULATED.3IONS-SCNC: 14 MEQ/L (ref 9–15)
AST SERPL-CCNC: 18 U/L (ref 0–40)
BILIRUB SERPL-MCNC: 0.3 MG/DL (ref 0.2–0.7)
BUN BLDV-MCNC: 15 MG/DL (ref 6–20)
CALCIUM SERPL-MCNC: 9.3 MG/DL (ref 8.5–9.9)
CHLORIDE BLD-SCNC: 104 MEQ/L (ref 95–107)
CO2: 23 MEQ/L (ref 20–31)
CREAT SERPL-MCNC: 1.18 MG/DL (ref 0.7–1.2)
GFR AFRICAN AMERICAN: >60
GFR NON-AFRICAN AMERICAN: >60
GLOBULIN: 2.6 G/DL (ref 2.3–3.5)
GLUCOSE BLD-MCNC: 89 MG/DL (ref 70–99)
HCT VFR BLD CALC: 44.1 % (ref 42–52)
HEMOGLOBIN: 14.6 G/DL (ref 14–18)
MCH RBC QN AUTO: 31.1 PG (ref 27–31.3)
MCHC RBC AUTO-ENTMCNC: 33.2 % (ref 33–37)
MCV RBC AUTO: 93.9 FL (ref 80–100)
PDW BLD-RTO: 14.6 % (ref 11.5–14.5)
PLATELET # BLD: 226 K/UL (ref 130–400)
POTASSIUM SERPL-SCNC: 4.4 MEQ/L (ref 3.4–4.9)
RBC # BLD: 4.7 M/UL (ref 4.7–6.1)
SODIUM BLD-SCNC: 141 MEQ/L (ref 135–144)
TOTAL PROTEIN: 7.3 G/DL (ref 6.3–8)
WBC # BLD: 5.9 K/UL (ref 4.8–10.8)

## 2022-03-09 NOTE — TELEPHONE ENCOUNTER
Pt called in with dental issues. States front tooth broke yesterday and he believes he ate it,     [facility] apt is 4.29.22  Pt denies pain, swelling and denies and concerns for infection right now. Pt is to go to urgent care for evaluation.

## 2022-03-10 ENCOUNTER — TELEPHONE (OUTPATIENT)
Dept: INFECTIOUS DISEASES | Age: 37
End: 2022-03-10

## 2022-03-10 LAB
ABSOLUTE CD 4 HELPER: 560 CELLS/UL (ref 430–1800)
CD4 % HELPER T CELL: 25 % (ref 32–64)
LYMPHOCYTE SUBSET PANEL 2 INFO: ABNORMAL
RPR: NORMAL

## 2022-03-10 NOTE — TELEPHONE ENCOUNTER
Pt called into clinic requesting help with basic needs. Requesting food/gas voucher. On his way to lab now. Pt into clinc  Needs assessed. 2 gas cards given to pt via [facility]        Denies any needs at this time. Denies any issues that need Doctor attention.  Will call with concerns

## 2022-03-14 LAB
HIV-1 QNT LOG, IU/ML: <1.47 LOG CPY/ML
HIV-1 QNT, IU/ML: ABNORMAL CPY/ML
INTERPRETATION: DETECTED

## 2022-03-15 ENCOUNTER — TELEMEDICINE (OUTPATIENT)
Dept: INFECTIOUS DISEASES | Age: 37
End: 2022-03-15
Payer: COMMERCIAL

## 2022-03-15 ENCOUNTER — TELEPHONE (OUTPATIENT)
Dept: INFECTIOUS DISEASES | Age: 37
End: 2022-03-15

## 2022-03-15 DIAGNOSIS — Z13.220 ENCOUNTER FOR LIPID SCREENING FOR CARDIOVASCULAR DISEASE: ICD-10-CM

## 2022-03-15 DIAGNOSIS — B20 HUMAN IMMUNODEFICIENCY VIRUS (HCC): ICD-10-CM

## 2022-03-15 DIAGNOSIS — Z13.6 ENCOUNTER FOR LIPID SCREENING FOR CARDIOVASCULAR DISEASE: ICD-10-CM

## 2022-03-15 DIAGNOSIS — B20 HIV INFECTION, UNSPECIFIED SYMPTOM STATUS (HCC): Primary | ICD-10-CM

## 2022-03-15 DIAGNOSIS — K02.9 DENTAL CARIES: Primary | ICD-10-CM

## 2022-03-15 DIAGNOSIS — E55.9 VITAMIN D DEFICIENCY: ICD-10-CM

## 2022-03-15 PROCEDURE — 99214 OFFICE O/P EST MOD 30 MIN: CPT | Performed by: INTERNAL MEDICINE

## 2022-03-15 NOTE — TELEPHONE ENCOUNTER
Call placed to pt as V/V follow up. Message left for pt to call office to discuss apt follow up further.

## 2022-03-15 NOTE — TELEPHONE ENCOUNTER
New orders reviewed--     Discussed compliance --- we did review this. Review recent VL. Pt has been taking ART. Reminders from his friend. Pt has no questions regarding apt. Discussed importance of dental follow up. Will not go to urgent walk in at [facility]  No pain. No issues with eating. Pt has scheduled apt with [facility]. --- 4.29.2022 at 10 am -- location [address]  Pt is to call [phone] in the morning      6 month follow up able to be scheduled. Discussed the need for lab work to be completed 1 week prior to next apt. Denies any other needs or acute issues that need doctor attention.

## 2022-03-15 NOTE — PROGRESS NOTES
possible esophageal thrush, overall malaise, URI with SOB. On chart review dating back to 2014, had an abnormal Echo with \"ischemia\" noted althought he EF noted to be normal at the time. + hx of recurrent pneumothorax and blebs with a RML lung nodule <1cm. He has a hx of VATS as well. There is also a positive rheumatoid factor from 2014 that was never followed. + hx of reynauds and rash on dorsal hand L hand.    + weight loss >30lbs over 8 months.   + cigarette use, no illicit drugs  No alcohol. Has been in pain management in the past with Dr Garnett Moritz due to persistent back pain from neck to lumbar region, which he thought was from sleeping on the couch, but seems to be persistent.    ~20 partners over the course of his lifetime, all male. 1 female initially. No TB exposure that he knows of. Likes to travel - has been to [place] this past February and [place] in the past, [facility] as a teenager. + dogs at home. Works as a manager at [employer]. Has another partner outside of the relationship - that person is on truvada Jayme Hind)  Current partner - Jordan Grimaldo. ( he is also COVID vaccinated )   No other hx of STDs in the past.   Has been in nursing home a day or two.      Support system: mom and grandma and siblings - only the brother and mother know of his status. In an emergency contact Jordan Grimaldo or his mother. Both have full disclosure. In case Jordan Grimaldo can't be reached then can talk to his mom. COVID vaccinated - pfizer May 14th was the final dose. Thinks he got COVID on 12/25 last year. Lately has been better about compliance with medications. Labs ok and reviewed with patient.    Poor dentition, now with tooth infection/ broken teeth - needs dental appointment    Physical Exam  Since we cannot conduct an in-person exam, the following were addressed with the patient to the best of my capability via virtual visit:   Patient does not perceive any new visual deficits  No diaphoresis or flushing in the face  Patient is able to flex and extend her neck with ease. Patient can inhale and exhale without any difficulty and chest seems to be expanding symmetrically. No conversational dyspnea. Patient does not feel any palpitations   Patient's  abdomen is not protruberant beyond their normal size. No new swelling of their joints. No observed neurological changes or slurred speech when speaking to the patient    No new skin rash or ulcers  Very poor dentition      Chemistry        Component Value Date/Time     03/09/2022 1534    K 4.4 03/09/2022 1534     03/09/2022 1534    CO2 23 03/09/2022 1534    BUN 15 03/09/2022 1534    CREATININE 1.18 03/09/2022 1534        Component Value Date/Time    CALCIUM 9.3 03/09/2022 1534    ALKPHOS 60 03/09/2022 1534    AST 18 03/09/2022 1534    ALT 16 03/09/2022 1534    BILITOT 0.3 03/09/2022 1534        Patient has been informed of the importance of protection during sexual encounters which include, but are not limited to vaginal intercourse, anal intercourse, and oral sex. Patient has also been made aware that it is a felony in the [address] to engage in a sexual encounter without first disclosing HIV status to partner. Assessment/Plan:  HIV infection - some labs are still pending. Encouraged compliance. Hx of ischemic heart disease  Recurrent pneumothorax history with blebs   Persistent back pain - stable now. Hx of broken teeth/poor dentition - teeth keep breaking. Plans before next visit:   Dental appointment/may need oral surgeon  Eye appointment  Pulm and allergy  PCP follow up, especially due to cardiac hx. Follow up in 6 months.    Time spent today in combination of reviewing patient's chart, medications, labs, pictures when pertinent, provider communication as necessary, counseling patient, care/coordination not otherwise reported here, and patient face to face virtual visit >30 min.   >50% of that time spent counseling and coordination of patient care  Patient was also appropriately counseled on preventive measures such as vaccinations, the importance of annual exam with their PCP, and the importance of health maintenance by dietary and exercise regimens. All questions were answered from an ID perspective and to the best of my ability.         Stacey Castro, DO

## 2022-04-14 ENCOUNTER — TELEPHONE (OUTPATIENT)
Dept: INFECTIOUS DISEASES | Age: 37
End: 2022-04-14

## 2022-04-14 NOTE — TELEPHONE ENCOUNTER
Pt called in stating he received letter for ODAP renewal.   CM apt made 5.2.22  ODAP expires 5.30.22  Reviewed all needed documentation for apt as well. Reviewed upcoming dental apt as well.

## 2022-04-29 ENCOUNTER — TELEPHONE (OUTPATIENT)
Dept: INFECTIOUS DISEASES | Age: 37
End: 2022-04-29

## 2022-04-29 NOTE — TELEPHONE ENCOUNTER
Pt called in with updates. Had [facility] apt today. Teeth all need extracted. Current infection. Was given antibx for this. Talking about dentures. Mostly covered under [employer] Not interm dentures though. Pt follows up next 6.1.22. he will get impressions at that time. He will call morning of to confirm apt. He will call office with update. Reviewed apt with CM. Denies or issues.

## 2022-05-02 ENCOUNTER — NURSE ONLY (OUTPATIENT)
Dept: INFECTIOUS DISEASES | Age: 37
End: 2022-05-02

## 2022-05-02 NOTE — PROGRESS NOTES
Re-Assessment      Patient ID:   Ronaldo Faye  Date:   5/2/2022      Client ID:  [id number]    [address] [phone] (home)     Family/House:    [] Partner  [] Children  [] Other -     Mental Health:   Hx of Depression     Substance Abuse:   NA  Social History     Socioeconomic History    Marital status: Single     Spouse name: Not on file    Number of children: Not on file    Years of education: Not on file    Highest education level: Not on file   Occupational History    Not on file   Tobacco Use    Smoking status: Current Every Day Smoker     Packs/day: 0.25     Types: Cigarettes    Smokeless tobacco: Never Used    Tobacco comment: cut down to 4 cig. /day   Vaping Use    Vaping Use: Never used   Substance and Sexual Activity    Alcohol use: No    Drug use: No    Sexual activity: Yes     Partners: Male   Other Topics Concern    Not on file   Social History Narrative    Not on file     Social Determinants of Health     Financial Resource Strain:     Difficulty of Paying Living Expenses: Not on file   Food Insecurity:     Worried About Running Out of Food in the Last Year: Not on file    Hilary of Food in the Last Year: Not on file   Transportation Needs:     Lack of Transportation (Medical): Not on file    Lack of Transportation (Non-Medical):  Not on file   Physical Activity:     Days of Exercise per Week: Not on file    Minutes of Exercise per Session: Not on file   Stress:     Feeling of Stress : Not on file   Social Connections:     Frequency of Communication with Friends and Family: Not on file    Frequency of Social Gatherings with Friends and Family: Not on file    Attends Tenriism Services: Not on file    Active Member of Clubs or Organizations: Not on file    Attends Club or Organization Meetings: Not on file    Marital Status: Not on file   Intimate Partner Violence:     Fear of Current or Ex-Partner: Not on file    Emotionally Abused: Not on file    Physically Abused: Not on file    Sexually Abused: Not on file   Housing Stability:     Unable to Pay for Housing in the Last Year: Not on file    Number of Places Lived in the Last Year: Not on file    Unstable Housing in the Last Year: Not on file       Housing:   apartment    Health/Healthcare:  HIV Stable   Physician Visit:  Dr. Jhoana Mccormick 3/15/22   [facility]    CD4:   Lab Results   Component Value Date    LABABSO 560 03/09/2022       Viral Load:  Lab Results   Component Value Date    [id number] 160,000 10/17/2018    TGH0XOUVGB <1.47 03/09/2022       Medical Insurance:  Payor: Adenike Alonso / Plan: Vicente Padgett STANLEY / Product Type: *No Product type* /    OHDAP/HIPSCA:  OHDAP   Renewal Date:  5/2/22    Income:    Employment    Legal Issues:    NA    Emergency Contact:   Extended Emergency Contact Information  Primary Emergency Contact: SITA Mcintyre [address] Phone: [phone]  Relation: Domestic Partner  Secondary Emergency Contact: Monae Holland  Address: [facility] Tarah Fay, [address] [place] [address] Phone: [phone]  Relation: Parent  Pt presented for scheduled appointment with CM. CM assisted with OHDAP Renewal Application for assistance with medication co-pays. CM completed Annual Review, updated RW Part A Eligibility for services. Pt works full-time, has medical coverage via employer. CM reviewed Grievance Policy and Sliding Scale with pt. CM completed PSA and SA/MH assessments. Pt has past hx of depression, feels no need for referral to counseling. Pt has no hx of SA. CM updated ISP, pt signed in agreement with POC. Pt remains compliant with meds, admitted he forgot \"one-time\". VL from 3/22 is <30 copies. Pt last had dental visit at [facility] 4/29/22. Pt stated that due to gum disease, all teeth will need to be extracted. CM provided support and encouragement. Pt is sexually active.   Pt is aware of U=U, undetectable equals un-transmittable. Pt stated he does practice safe sex. CM provided socialization, no specific issues discussed. CM provide gas vouchers via [facility].  CM assessed need, provisions as last resort. CM will follow up with pt as needed.

## 2022-05-31 ENCOUNTER — TELEPHONE (OUTPATIENT)
Dept: INFECTIOUS DISEASES | Age: 37
End: 2022-05-31

## 2022-05-31 NOTE — TELEPHONE ENCOUNTER
Per request, JERAD called Eric Courtney regarding bill pt received and had sent copy to RN of this Clinic. CM spoke with \"Sierra\" regarding bill. Pt is eligible for [facility] until 8/22. Pt had a previous dental appointment on 4/29/22, paid by  Part A. Pt will have next appointment 6/1/22, to be billed to [facility] Part A Program.  Pt and RN informed of same.   CM reminded pt of RW Part A Eligibility update to be completed in August.

## 2022-06-28 RX ORDER — ABACAVIR SULFATE, DOLUTEGRAVIR SODIUM, LAMIVUDINE 600; 50; 300 MG/1; MG/1; MG/1
1 TABLET, FILM COATED ORAL DAILY
Qty: 30 TABLET | Refills: 3 | Status: SHIPPED | OUTPATIENT
Start: 2022-06-28

## 2022-09-06 ENCOUNTER — TELEPHONE (OUTPATIENT)
Dept: INFECTIOUS DISEASES | Age: 37
End: 2022-09-06

## 2022-09-06 NOTE — TELEPHONE ENCOUNTER
Pt called in unable to complete lab work. Prefers Dr Abhijeet Cervantes R/S to end of oct. Importance of apt discussed. Issues with [facility] and cost- will try care credit. Getting dentures  Worries about ODAP and co pay coverage. Discussed with CM.      Needs RW renewal for eligibility

## 2022-09-06 NOTE — TELEPHONE ENCOUNTER
Pt called RN, per pt, he is being charged for co-pay payments for Vyvanse. Pt stated he has not received bill in past.  [facility] and medication [facility]ays. CM called Orion French at [address], let message regarding issue and requested return call.

## 2022-09-07 ENCOUNTER — TELEPHONE (OUTPATIENT)
Dept: INFECTIOUS DISEASES | Age: 37
End: 2022-09-07

## 2022-09-07 NOTE — TELEPHONE ENCOUNTER
Pt had contacted RN, was informed by [facility] that his eligibility for RW Part A services has to be renewed. RN stated she has been trying to schedule an appointment for pt to complete Annual Review. CM called pt to schedule an appointment for AR, eligibility termed 8/25/22. Cm requested pt to return call to schedule appointment, option for phone appointment offered.

## 2022-09-08 ENCOUNTER — TELEPHONE (OUTPATIENT)
Dept: INFECTIOUS DISEASES | Age: 37
End: 2022-09-08

## 2022-09-08 NOTE — TELEPHONE ENCOUNTER
CM called pt regarding scheduled appointment today at [place] [address] for Annual Review. CM offered appointment via phone when scheduling, pt stated he kim present in office since he will be \"off\". CM left message requesting return call.

## 2022-09-13 NOTE — TELEPHONE ENCOUNTER
Spoke with pt. CM apt scheduled for 9.14.22 at 11 via phone. Pt will provide income today or tomorrow for apt. Denies other needs right now.

## 2022-09-14 ENCOUNTER — NURSE ONLY (OUTPATIENT)
Dept: INFECTIOUS DISEASES | Age: 37
End: 2022-09-14

## 2022-09-14 NOTE — PROGRESS NOTES
Re-Assessment      Patient ID:   Carl Bonilla  Date:   9/14/2022      Client ID:  [id number]    [address]  [phone] (home)     Family/House:    [] Partner  [] Children  [] Other -     Mental Health:   Hx of Depression    Substance Abuse:   NA  Social History     Socioeconomic History    Marital status: Single     Spouse name: Not on file    Number of children: Not on file    Years of education: Not on file    Highest education level: Not on file   Occupational History    Not on file   Tobacco Use    Smoking status: Every Day     Packs/day: 0.25     Types: Cigarettes    Smokeless tobacco: Never    Tobacco comments:     cut down to 4 cig. /day   Vaping Use    Vaping Use: Never used   Substance and Sexual Activity    Alcohol use: No    Drug use: No    Sexual activity: Yes     Partners: Male   Other Topics Concern    Not on file   Social History Narrative    Not on file     Social Determinants of Health     Financial Resource Strain: Not on file   Food Insecurity: Not on file   Transportation Needs: Not on file   Physical Activity: Not on file   Stress: Not on file   Social Connections: Not on file   Intimate Partner Violence: Not on file   Housing Stability: Not on file       Housing:   home    Health/Healthcare:  HIV Stable   Physician Visit:  Dr. Norman Frost 3/22   Dental Visit:  Lx[facility] Nevarez 9/22    CD4:   Lab Results   Component Value Date/Time    LABABSO 560 03/09/2022 03:34 PM       Viral Load:  Lab Results   Component Value Date/Time    [id number] 160,000 10/17/2018 03:11 PM    KFW0BORWHM <1.47 03/09/2022 03:34 PM       Medical Insurance:  Payor: Sabrina Vega / Plan: Devere Guard STANLEY / Product Type: *No Product type* /    OHDAP/HIPSCA:  OHDAP   Renewal Date:  11/30/22    Income:    Employment    Legal Issues:    NA    Emergency Contact:   Extended Emergency Contact Information  Primary Emergency Contact: SITA Yang [address] Phone: [phone]  Relation: Domestic Partner  Secondary Emergency Contact: HollandMonae  Address: [place] Fay, [address] [facility] of [address] Phone: [phone]  Relation: Parent  CM completed Annual Review, updated RW Part A Eligibility for services. Pt has insurance via [employer], [address] assistance with premium payments and medication co-pays. CM reviewed Grievance Policy and Sliding Scale with pt. CM completed PSA and SA/MH Assessments. Pt has hx of depression, no hx of SA. CM updated ISP, pt agreed with POC. Pt is compliant with meds, although has not had an appointment with Dr. Janie Borden since 3/22, nor lab work. Pt has been following with dentist at [facility]. Pt last had appointment 2 weeks ago. Pt is sexually active, is aware of U=U, undetectable equals un-transmittable. CM provided socialization. Pt did discuss co-pays he had to pay for Vyvanse, not on OHDAP formulary. C encouraged pt to use [employer] or a coupon provided by the pharmaceutical company. CM reminded pt that OHDAP Application will need to be renewed by 11/30/22. CM will follow up with pt as needed.

## 2022-10-12 ENCOUNTER — TELEPHONE (OUTPATIENT)
Dept: INFECTIOUS DISEASES | Age: 37
End: 2022-10-12

## 2022-10-25 ENCOUNTER — TELEPHONE (OUTPATIENT)
Dept: INFECTIOUS DISEASES | Age: 37
End: 2022-10-25

## 2022-10-25 NOTE — TELEPHONE ENCOUNTER
Pt called in with update  Recently went to ER [facility]-- dx cellulitis left leg. Given bactrim BID right now. States swelling much better, some pain and redness still. Redness has decreased a lot per patient. Discussed need for labs prior to next week apt. Reviewed apt date and time and this works for patient. He is busy with work but states he will be at this apt. Requested he call if issues arise and can not make it to apt. He verbalized understanding. Discussed compliance. States \"I am taking them for the most part\" occasionally forgets. Reinforced compliance. Lambert Alanis Updates annual/biannual follow up    [employer] Eligible----   yes    RW routine labs completed? ____ no_  will be going for labs Thursday 27th when he is off of work. Compliance ___ concerns. Forgets a lot. Medication:  triumeq    Undetectable ___yes__ ___         LZIC:2.2.45      Insurance changes ___no ____   Pharmacy changes ____no _____        Housing----        ------Stable no issues           Partner __yes ___ long term               STD check needed ___ annual. No concerns per pt. HIV risk counseling ___yes ____     Transport _____no issue _________         Income--- WORKS ____ works at [employer] full time    Mental health ___denies concern or need for referral_____  HX of depression    Consoling : completed. Smoking--  denies  Etoh--- denies  Drug use--- denies      U=U--- reviewed and pt aware       [facility]ist:    [facility]Date: ___4.2022______ Encouraged F/U ____yes_____   OBGYN-- NA  COORS-- no    PCP:  Dr Claus Bradshaw group? --- not interested        Pnemo vac ______4. 6.2009____      Flu vac ________   refused__yes ----refused as of right now__    TB GOLD x 1 since dx: yes  Hep C x 1 since dx: yes  G/C if sexually active- yes  Hep B screening _2018______  Hep B vaccine ____NA immune _____      Reviewed scheduled apts and scheduled follow up as directed if needed.           Denies other issues or concerns that need Physician attention. Pt will call office if needed.

## 2022-10-27 DIAGNOSIS — E55.9 VITAMIN D DEFICIENCY: ICD-10-CM

## 2022-10-27 DIAGNOSIS — Z13.220 ENCOUNTER FOR LIPID SCREENING FOR CARDIOVASCULAR DISEASE: ICD-10-CM

## 2022-10-27 DIAGNOSIS — B20 HIV INFECTION, UNSPECIFIED SYMPTOM STATUS (HCC): ICD-10-CM

## 2022-10-27 DIAGNOSIS — Z13.6 ENCOUNTER FOR LIPID SCREENING FOR CARDIOVASCULAR DISEASE: ICD-10-CM

## 2022-10-27 LAB
ALBUMIN SERPL-MCNC: 4.6 G/DL (ref 3.5–4.6)
ALP BLD-CCNC: 61 U/L (ref 35–104)
ALT SERPL-CCNC: 22 U/L (ref 0–41)
ANION GAP SERPL CALCULATED.3IONS-SCNC: 15 MEQ/L (ref 9–15)
AST SERPL-CCNC: 35 U/L (ref 0–40)
BILIRUB SERPL-MCNC: 0.6 MG/DL (ref 0.2–0.7)
BUN BLDV-MCNC: 27 MG/DL (ref 6–20)
CALCIUM SERPL-MCNC: 9.3 MG/DL (ref 8.5–9.9)
CHLORIDE BLD-SCNC: 101 MEQ/L (ref 95–107)
CHOLESTEROL, TOTAL: 203 MG/DL (ref 0–199)
CO2: 21 MEQ/L (ref 20–31)
CREAT SERPL-MCNC: 1.45 MG/DL (ref 0.7–1.2)
GFR SERPL CREATININE-BSD FRML MDRD: >60 ML/MIN/{1.73_M2}
GLOBULIN: 3.3 G/DL (ref 2.3–3.5)
GLUCOSE BLD-MCNC: 91 MG/DL (ref 70–99)
HCT VFR BLD CALC: 45 % (ref 42–52)
HDLC SERPL-MCNC: 47 MG/DL (ref 40–59)
HEMOGLOBIN: 14.9 G/DL (ref 14–18)
LDL CHOLESTEROL CALCULATED: 147 MG/DL (ref 0–129)
MCH RBC QN AUTO: 30.3 PG (ref 27–31.3)
MCHC RBC AUTO-ENTMCNC: 33.2 % (ref 33–37)
MCV RBC AUTO: 91.4 FL (ref 79–92.2)
PDW BLD-RTO: 14.9 % (ref 11.5–14.5)
PLATELET # BLD: 239 K/UL (ref 130–400)
POTASSIUM SERPL-SCNC: 4.2 MEQ/L (ref 3.4–4.9)
RBC # BLD: 4.92 M/UL (ref 4.7–6.1)
SODIUM BLD-SCNC: 137 MEQ/L (ref 135–144)
TOTAL PROTEIN: 7.9 G/DL (ref 6.3–8)
TRIGL SERPL-MCNC: 46 MG/DL (ref 0–150)
WBC # BLD: 5.9 K/UL (ref 4.8–10.8)

## 2022-10-28 LAB
HEPATITIS C ANTIBODY: NONREACTIVE
VITAMIN D 25-HYDROXY: 25.1 NG/ML

## 2022-10-30 LAB
ABSOLUTE CD 4 HELPER: 374 CELLS/UL (ref 430–1800)
CD4 % HELPER T CELL: 19 % (ref 32–64)
HIV-1 QNT LOG, IU/ML: <1.47 LOG CPY/ML
HIV-1 QNT, IU/ML: ABNORMAL CPY/ML
INTERPRETATION: DETECTED
LYMPHOCYTE SUBSET PANEL 2 INFO: ABNORMAL

## 2022-10-31 LAB
QUANTI TB GOLD PLUS: NEGATIVE
QUANTI TB1 MINUS NIL: 0 IU/ML (ref 0–0.34)
QUANTI TB2 MINUS NIL: 0 IU/ML (ref 0–0.34)
QUANTIFERON MITOGEN: >10 IU/ML
QUANTIFERON NIL: 0.03 IU/ML

## 2022-11-01 ENCOUNTER — OFFICE VISIT (OUTPATIENT)
Dept: INFECTIOUS DISEASES | Age: 37
End: 2022-11-01
Payer: COMMERCIAL

## 2022-11-01 VITALS
DIASTOLIC BLOOD PRESSURE: 84 MMHG | HEART RATE: 88 BPM | SYSTOLIC BLOOD PRESSURE: 118 MMHG | TEMPERATURE: 98.7 F | BODY MASS INDEX: 23.22 KG/M2 | WEIGHT: 176 LBS

## 2022-11-01 DIAGNOSIS — Z76.89 ENCOUNTER TO ESTABLISH CARE WITH NEW DOCTOR: ICD-10-CM

## 2022-11-01 DIAGNOSIS — S81.802A WOUND OF LEFT LOWER EXTREMITY, INITIAL ENCOUNTER: ICD-10-CM

## 2022-11-01 DIAGNOSIS — J43.9 BLEB, LUNG (HCC): ICD-10-CM

## 2022-11-01 DIAGNOSIS — Z23 NEED FOR INFLUENZA VACCINATION: ICD-10-CM

## 2022-11-01 DIAGNOSIS — R74.8 ELEVATED CREATINE KINASE: ICD-10-CM

## 2022-11-01 DIAGNOSIS — R79.89 ELEVATED SERUM CREATININE: ICD-10-CM

## 2022-11-01 DIAGNOSIS — N53.9 MALE SEXUAL DYSFUNCTION: ICD-10-CM

## 2022-11-01 DIAGNOSIS — Z92.89: ICD-10-CM

## 2022-11-01 DIAGNOSIS — B20 CURRENTLY ASYMPTOMATIC HIV INFECTION, WITH HISTORY OF HIV-RELATED ILLNESS (HCC): Primary | ICD-10-CM

## 2022-11-01 DIAGNOSIS — R91.8 LUNG NODULES: ICD-10-CM

## 2022-11-01 PROCEDURE — 90674 CCIIV4 VAC NO PRSV 0.5 ML IM: CPT | Performed by: INTERNAL MEDICINE

## 2022-11-01 PROCEDURE — 90471 IMMUNIZATION ADMIN: CPT | Performed by: INTERNAL MEDICINE

## 2022-11-01 PROCEDURE — 99214 OFFICE O/P EST MOD 30 MIN: CPT | Performed by: INTERNAL MEDICINE

## 2022-11-01 RX ORDER — SILDENAFIL 50 MG/1
25 TABLET, FILM COATED ORAL PRN
Qty: 10 TABLET | Refills: 0 | Status: SHIPPED | OUTPATIENT
Start: 2022-11-01

## 2022-11-01 ASSESSMENT — PATIENT HEALTH QUESTIONNAIRE - PHQ9
SUM OF ALL RESPONSES TO PHQ QUESTIONS 1-9: 0
2. FEELING DOWN, DEPRESSED OR HOPELESS: 0
SUM OF ALL RESPONSES TO PHQ QUESTIONS 1-9: 0
SUM OF ALL RESPONSES TO PHQ QUESTIONS 1-9: 0
SUM OF ALL RESPONSES TO PHQ9 QUESTIONS 1 & 2: 0
SUM OF ALL RESPONSES TO PHQ QUESTIONS 1-9: 0
1. LITTLE INTEREST OR PLEASURE IN DOING THINGS: 0

## 2022-11-01 NOTE — PROGRESS NOTES
Pt in clinic for routine b20 apt. Doing well. No complaints. Compliant with medication? Yes. States he was non-compliant before, is not now. Missing doses? No    VL date:10/27/22    undetectable? Y    VL: <30  CD4 date: 10/27/22   #: 826  RPR date: 3/9/22  Neg.          TB gold date: 10/27/22   Neg   (1 since dx)    Hep C date: 10/27/22   Neg   (1 since dx)    Lipds date:  10/27/22    Chol. 203 (H)  Trigly. 46  (WNL)    G/C: -(G)  Neg     - (C) Neg            (if sexually active)    Flu vacc:    Date of last:            would like:  N         Pnemo vacc. Last: 2020    plans to get:  N       Hep B date:               Neg       Pos          (1 since dx)     Last eligibility date:                     Grievance policy date:           OBGYN date:  N/A         Dental date:  Smoke Y      U=U discussed    Y      Drugs   N  Etoh     Y   occasional   Mental Health discussed: Y    PCP:  N    Referred to: Dr. Joanna Velez: 11/10/22 @ 2:00pm    Working? Y         Housing:    stable           Labs and all other information reviewed with pt. Information provided by pt is true to the best of their knowledge. Denies any questions or concerns at this time. Will contact office if needed.

## 2022-11-01 NOTE — PROGRESS NOTES
11/1/2022    Patient Name: Vickie Thomason  YOB: 1985  Patient Sex: male  Medical Record Number: [id number]    Background:  Patient with HIV + MSM with a longterm HIV+ partner since 2006. Presented to my office with mario CD4 <50, 8%, viral load is 160,000. G6PD negative, RPR negative, HLA negative, serum quantiferon negative, chlam/gonorrhea negative, Hepatitis negative. Genotype with resistance to stavudine and zidovudine, otherwise negative   Presented with + thrush, including possible esophageal thrush, overall malaise, URI with SOB. On chart review dating back to 2014, had an abnormal Echo with \"ischemia\" noted althought he EF noted to be normal at the time. He has followed up with cardiology for this.   + hx of recurrent pneumothorax and blebs with a RML lung nodule <1cm. Last CT was in 2018. He did not have the one ordered in 2021 yet. He has a hx of VATS as well. There is also a positive rheumatoid factor from 2014 that was never followed. + hx of reynauds    + cigarette use, no illicit drugs  No alcohol. Persistent back pain from neck to lumbar region, has seen pain management. Likes to travel - has been to [place] this past February and [place] and [place] in the past, [facility] as a teenager. No TB exposure that is known  + dogs at home. Current partner - Pam Benites. ( he is also COVID vaccinated )     Has been in shelter in the past     Support system: mom and grandma and siblings - only the brother and mother know of his status. In an emergency contact Pam Benites or his mother. Both have full disclosure. In case Pam Benites can't be reached then can talk to his mom. COVID vaccinated - Manju Simons     Lately has been better about compliance with medications. Labs ok and reviewed with patient.    Poor dentition, has broken teeth     General: Patient appears in no acute distress, pleasant and cooperative  Skin: no new rashes or erythema or induration  He has a stable L leg wound anterior shin that looks like a bite of some sort with central area that looks necrotic and scabbed. The surrounding area is semi fluctuant but not warm and not painful overall. There is no cellulitis, there is no drainage. He has seen wound care for this. He shows me a photo of a nest of something in the ceiling, after digging out the nest a large area with what look like wasps or hornets was uncovered underneath. It has been evacuated. His partner also has a wound that looks similar but not \" as bad\" per patient on his leg. HEENT: PERRL, EOMI, MMM, Dentition is poor, Neck is supple, No cervical adenopathy  Heart: S1 S2  Lungs: clear bilaterally to auscultation  Abdomen: soft, ND, NTTP, +BS  Extrem:+pulses, no calf pain, no asymmetry of the upper or lower extremities  Neuro exam: CN II-XII intact, facial expressions symmertrical bilaterally, no slurred speech, muscle strength equal bilaterally  Thin stature overall. Depression screening: No suicidal thoughts, No thoughts of harming others, No disinterest in doing things, Not feeling down or hopeless, spirits good overall  Anxiety Screening: Some stress induced anxiety from work - staffing issues. Drug screening: No illicit drug use. Tobacco Screening: none  Alcohol screening: none/occasional  Exercise screening: walks at work per patient but no outside exercise right now.    Has had covid vaccine   He is on wellbutrin and medication for ADHD         Chemistry        Component Value Date/Time     10/27/2022 0903    K 4.2 10/27/2022 0903     10/27/2022 0903    CO2 21 10/27/2022 0903    BUN 27 (H) 10/27/2022 0903    CREATININE 1.45 (H) 10/27/2022 0903        Component Value Date/Time    CALCIUM 9.3 10/27/2022 0903    ALKPHOS 61 10/27/2022 0903    AST 35 10/27/2022 0903    ALT 22 10/27/2022 0903    BILITOT 0.6 10/27/2022 6308        Patient has been informed of the importance of protection during sexual encounters which include, but are not limited to vaginal intercourse, anal intercourse, and oral sex. Patient has also been made aware that it is a felony in the [address] to engage in a sexual encounter without first disclosing HIV status to partner. Assessment/Plan:  HIV infection - some labs are still pending. Encouraged compliance. JUDI - taking norco x 1 week and was also taking ibuprofen but stopped on 10/26. Monitor Cr with next lab draw. Recheck cr in 2 weeks. Lab orders placed. LLE bite with abscess and scab - will need wound care to follow for debridement and healing. Hx of ischemic heart disease - followed with cardiology and dismissed. No further work up. Recurrent pneumothorax history with blebs  - still needs a follow up CT Chest, pulmonary follow up due to lung nodules   Persistent back pain - stable now. Hx of broken teeth/poor dentition - teeth keep breaking. Male sexual dysfunction - asking for Viagra script until he can get into his primary. Discussed monitoring BP    Plans before next visit:   Dental appointment/may need oral surgeon - goes back to dentist on 12/23  Eye appointment  Pulmonology appointment. CT chest follow up lung nodules. Add on RF and DRAKE to next set of labs to monitor. Follow up with PCP regularly. Needs a PCP  Flu shot    Follow up in 6 months. Time spent today in combination of reviewing patient's chart, medications, labs, pictures when pertinent, provider communication as necessary, counseling patient, care/coordination not otherwise reported here, and patient face to face virtual visit >30 min.   >50% of that time spent counseling and coordination of patient care  Patient was also appropriately counseled on preventive measures such as vaccinations, the importance of annual exam with their PCP, and the importance of health maintenance by dietary and exercise regimens. All questions were answered from an ID perspective and to the best of my ability.         Stacey Castro, DO      LLE spider bite with central necrosis improving - need wound care

## 2022-11-01 NOTE — PROGRESS NOTES
Routine b20 apt  Recent ER for leg wound. Looks and feels better today. Not read anymore. \"Scab\" noted: left leg  Dental issues. Follows at [facility]. Next apt 12.23.22 no pain or infection right now per pt. Teeth breaking at times. Odap apt scheduled 11.10.22-- pt reminded he will need to provide updated income. Here today for Flu vaccine too. Doing ok. No issues. No issues with medications. Have you ever had a reaction to a flu vaccine? no  Are you able to eat eggs without adverse effects? Yes  Do you have any current illness? 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 Please see immunization tab  After obtaining consent, and per orders of Dr. Chris Bacon, injection of  0.5  m.l   FLU VACCINE  Was administered to left deltoid. Manf:  Seqirus--Flucelvax  Lot #: [id number]  EXP: 6.30.23  [facility]: [id number]  Patient tolerated well. Denies pain at site. Patient instructed to remain in clinic for 5-10 minutes afterwards, and to report any adverse reaction to me immediately. Needs assessed. 2 gas cards given to pt via [facility]    Denies concerns for mental health  Smoking still  Etoh socially  Current long term partner. Counseling completed  Has PCP  Pnemo vac 2020 and hep B completed (immune)   TB,Hep C and G/C completed   Has car no housing concerns  VL undetectable and pt is aware of u=u  Orders placed per Dr Chris Bacon    PCP scheduled. Will assist patient with scheduling other follow up. He will call insurance regarding eye doctor coverage. Denies any issues or concerns  Will call office if needed        Was able to schedule 11.14.22 at 115 with pulmonology  Wound center will call patient to schedule-- spoke with og     Pt updated.

## 2022-11-02 LAB
C. TRACHOMATIS DNA ,URINE: NEGATIVE
N. GONORRHOEAE DNA, URINE: NEGATIVE

## 2022-11-03 ENCOUNTER — TELEPHONE (OUTPATIENT)
Dept: INFECTIOUS DISEASES | Age: 37
End: 2022-11-03

## 2022-11-03 NOTE — TELEPHONE ENCOUNTER
Pt update    Took shower scab on leg fell off per pt. Dr Wilbern Kawasaki updated. Pt is to still follow up with wound center. Reviewed this with patient as well. Denies questions.

## 2022-11-14 ENCOUNTER — NURSE ONLY (OUTPATIENT)
Dept: INFECTIOUS DISEASES | Age: 37
End: 2022-11-14

## 2022-11-14 NOTE — PROGRESS NOTES
Pt presented for scheduled appointment. CM assisted pt with completing [facility] Renewal Application for assistance with premium and medication co-pay payments. CM encouraged pt to provided updated Premium amount for 2023 as soon as he receives notice in order for payment to be  made appropriately for January 2023. CM will follow up with pt as needed. Per request, CM provide gas cards to facilitate transport, need assessed.

## 2022-12-21 ENCOUNTER — TELEPHONE (OUTPATIENT)
Dept: INFECTIOUS DISEASES | Age: 37
End: 2022-12-21

## 2022-12-21 NOTE — TELEPHONE ENCOUNTER
Received call from pt. Upset. States [place] [facility] is going through it right now\"   States he has abscess on left knee area. Went to ER. InD and given antibx. Saw dr Armin Leach today. States he was told he need surgery and needs to be admitted to [facility]. States he will be going there later today. Dr Andrés Nunez updated. Pt called back, states he is told to go asap so he can have procedure tonight. Infection is concerning per pt. He will call with other updates. States he will tell them he would like Dr Andrés Nunez to be updated if possible.

## 2022-12-27 ENCOUNTER — TELEMEDICINE (OUTPATIENT)
Dept: INFECTIOUS DISEASES | Age: 37
End: 2022-12-27
Payer: COMMERCIAL

## 2022-12-27 DIAGNOSIS — L02.416 CUTANEOUS ABSCESS OF LEFT LOWER EXTREMITY: ICD-10-CM

## 2022-12-27 DIAGNOSIS — B20 HUMAN IMMUNODEFICIENCY VIRUS (HCC): Primary | ICD-10-CM

## 2022-12-27 DIAGNOSIS — R91.8 LUNG NODULES: ICD-10-CM

## 2022-12-27 DIAGNOSIS — L02.31 CUTANEOUS ABSCESS OF BUTTOCK: ICD-10-CM

## 2022-12-27 PROCEDURE — 99214 OFFICE O/P EST MOD 30 MIN: CPT | Performed by: INTERNAL MEDICINE

## 2022-12-27 ASSESSMENT — PATIENT HEALTH QUESTIONNAIRE - PHQ9
1. LITTLE INTEREST OR PLEASURE IN DOING THINGS: 0
2. FEELING DOWN, DEPRESSED OR HOPELESS: 0
SUM OF ALL RESPONSES TO PHQ QUESTIONS 1-9: 0
SUM OF ALL RESPONSES TO PHQ QUESTIONS 1-9: 0
SUM OF ALL RESPONSES TO PHQ9 QUESTIONS 1 & 2: 0
SUM OF ALL RESPONSES TO PHQ QUESTIONS 1-9: 0
SUM OF ALL RESPONSES TO PHQ QUESTIONS 1-9: 0

## 2022-12-27 NOTE — PROGRESS NOTES
Mrsa positive    Bactrim/ clinda/ amoxicillin    [facility]  - hibiclen, mupirocin     12/27/2022    Patient Name: Christian Catalan  YOB: 1985  Patient Sex: male  Medical Record Number: [id number]    Background:  Patient with HIV + MSM with a longterm HIV+ partner since 2006. Presented to my office with mario CD4 <50, 8%, viral load is 160,000. G6PD negative, RPR negative, HLA negative, serum quantiferon negative, chlam/gonorrhea negative, Hepatitis negative. Genotype with resistance to stavudine and zidovudine, otherwise negative   Presented with + thrush, including possible esophageal thrush, overall malaise, URI with SOB. + hx of recurrent pneumothorax and blebs with a RML lung nodule <1cm. Last CT was in 2018. He did not have the one ordered in 2021 yet. He has a hx of VATS as well. There is also a positive rheumatoid factor from 2014 that was never followed. + hx of reynauds    + cigarette use, no illicit drugs  No alcohol. Persistent back pain from neck to lumbar region, has seen pain management. Likes to travel - has been to [place] this past February and [facility] in the past, [facility] as a teenager. No TB exposure that is known  + dogs at home. Current partner - Lito Rocha. ( he is also COVID vaccinated )   Has been in nursing home in the past     Support system: mom and grandma and siblings - only the brother and mother know of his status. In an emergency contact Lito Rocha or his mother. Both have full disclosure. In case Lito Rocha can't be reached then can talk to his mom.       General: Patient appears in no acute distress, pleasant and cooperative  Skin: no new rashes or erythema or induration  HEENT: PERRL, EOMI, MMM, Dentition is poor, Neck is supple, No cervical adenopathy  Heart: S1 S2  Lungs: clear bilaterally to auscultation  Abdomen: soft, ND, NTTP, +BS  Extrem:+pulses, no calf pain, no asymmetry of the upper or lower extremities  The L knee is without any erythema, there is still a scab but no apparent drainage  - does not appear infected now. Neuro exam: CN II-XII intact, facial expressions symmertrical bilaterally, no slurred speech, muscle strength equal bilaterally  Thin stature overall. Chemistry        Component Value Date/Time     10/27/2022 0903    K 4.2 10/27/2022 0903     10/27/2022 0903    CO2 21 10/27/2022 0903    BUN 27 (H) 10/27/2022 0903    CREATININE 1.45 (H) 10/27/2022 0903        Component Value Date/Time    CALCIUM 9.3 10/27/2022 0903    ALKPHOS 61 10/27/2022 0903    AST 35 10/27/2022 0903    ALT 22 10/27/2022 0903    BILITOT 0.6 10/27/2022 2725        Patient has been informed of the importance of protection during sexual encounters which include, but are not limited to vaginal intercourse, anal intercourse, and oral sex. Patient has also been made aware that it is a felony in the [address] to engage in a sexual encounter without first disclosing HIV status to partner. Assessment/Plan:  HIV infection   Cutaneous abscesses R gluteal and L prepatellar s/[facility] - MRSA  infection, s/p I+D ( clinda suscept )   Still needs a follow up CT Chest, pulmonary follow up due to lung nodules  - ordered but not done. Plans before next visit:   Hibiclens to use 10 days out of the month x 3 months from waist down   Mupirocin to nares x 10 days per month x 3 months to assist with decolonization. Pulmonology appointment. CT chest follow up lung nodules. Add on RF and DRAKE to next set of labs to monitor. Added but not yet done. Follow up with PCP regularly. Follow up in 6 months.    Time spent today in combination of reviewing patient's chart, medications, labs, pictures when pertinent, provider communication as necessary, counseling patient, care/coordination not otherwise reported here, and patient face to face virtual visit >30 min.   >50% of that time spent counseling and coordination of patient care  Patient was also appropriately counseled on preventive measures such as vaccinations, the importance of annual exam with their PCP, and the importance of health maintenance by dietary and exercise regimens. All questions were answered from an ID perspective and to the best of my ability. Both the patient and the provider were located within the state in which the provider is licensed to practice.        Stacey Castro, DO

## 2022-12-27 NOTE — TELEPHONE ENCOUNTER
Tolerated DC of abcess. Doing ok  Feels better per pt. No drainage noted today. Needs 1 week follow up  Scheduled for V/V today.    Denies questions/

## 2022-12-27 NOTE — PROGRESS NOTES
Doing ok. Back to work today. Wound much better. MRSA + and treated. Denies drainage or other concerns. No fevers. 3-4 month apt scheduled already  100% Compliance encouraged. Will call if wound gets worse or other concerns.

## 2022-12-28 RX ORDER — MUPIROCIN CALCIUM 20 MG/G
CREAM TOPICAL
Qty: 15 G | Refills: 2 | Status: SHIPPED | OUTPATIENT
Start: 2022-12-28 | End: 2023-01-27

## 2022-12-28 RX ORDER — CHLORHEXIDINE GLUCONATE 4 G/100ML
SOLUTION TOPICAL
Qty: 118 ML | Refills: 2 | Status: SHIPPED | OUTPATIENT
Start: 2022-12-28 | End: 2023-01-11

## 2023-04-05 DIAGNOSIS — B20 HUMAN IMMUNODEFICIENCY VIRUS (HCC): Primary | ICD-10-CM

## 2023-04-06 ENCOUNTER — TELEPHONE (OUTPATIENT)
Dept: INFECTIOUS DISEASES | Age: 38
End: 2023-04-06

## 2023-04-19 NOTE — TELEPHONE ENCOUNTER
Pt returned call  CM apt scheduled for 5.4.23 in office. Reviewed all needed documentation for update. This includes proof of income ( 1 month total, most recent) proof of residency and/or ID as well as any insurance updates or changes. He will provide income and insurance changes. No change to insurance or res per pt.     pt is aware of the program, verbalized understanding and denies questions. Missed last apt with Dr Poonam Simon. Prefers to only see Dr Ernesto Henderson. Discussed partners are available if needed as well.  Follow up scheduled 6.8.23 per pt request. Labs to be completed in May

## 2023-05-03 ENCOUNTER — TELEPHONE (OUTPATIENT)
Dept: INFECTIOUS DISEASES | Age: 38
End: 2023-05-03

## 2023-05-03 NOTE — TELEPHONE ENCOUNTER
Received call from pt stating he needs to R/S CM apt. States his uncle passed away. Provided active listening and support as pt discussed this. He was close with his uncle. Doing ok with meds. -- misses once weekly. Will complete labs when here for CM apt. He will call if other issues arise.

## 2023-05-16 ENCOUNTER — NURSE ONLY (OUTPATIENT)
Dept: INFECTIOUS DISEASES | Age: 38
End: 2023-05-16

## 2023-05-16 DIAGNOSIS — B20 HUMAN IMMUNODEFICIENCY VIRUS (HCC): Primary | ICD-10-CM

## 2023-05-16 NOTE — PROGRESS NOTES
CM met with pt, updated ISP, pt signed in agreement with POC. Plan of care included medical, dental, mental health and insurance. CM assisted with OHDAP Renewal Application for assistance with premium payment and medication co-pays. CM provided 1x1 for support. Pt discussed recent suicide of uncle and the grief he has been undergoing as well as the stress of work and possible transfer. CM provided support and active listening. Pt informed CM he was recently prescribed Lexapro for depression and anxiety. CM will follow up with pt as needed.

## 2023-05-16 NOTE — PROGRESS NOTES
Reviewed upcoming apt. Labs needed prior. He will go later this week. Discussed compliance. Missed twice last week. States he has been doing better . Reinforced need for 100% compliance.

## 2023-05-18 ENCOUNTER — TELEPHONE (OUTPATIENT)
Dept: INFECTIOUS DISEASES | Age: 38
End: 2023-05-18

## 2023-05-18 NOTE — TELEPHONE ENCOUNTER
Pt called, unable to obtain needed insurance premium amount for OHDAP Application   CM will provide assistance. Pt sent CM copy of a tax form received for which he has questions. CM unable to open form in manner which pt provided via text. CM informed pt of same.

## 2023-05-22 ENCOUNTER — TELEPHONE (OUTPATIENT)
Dept: INFECTIOUS DISEASES | Age: 38
End: 2023-05-22

## 2023-05-22 NOTE — TELEPHONE ENCOUNTER
CM was able to obtain insurance premium amount and address to where premiums are to be sent in order to submit OHDAP Application. CM will process application and submit to [facility] ([facility]). Pt informed of same.

## 2023-05-24 ENCOUNTER — TELEPHONE (OUTPATIENT)
Dept: INFECTIOUS DISEASES | Age: 38
End: 2023-05-24

## 2023-05-26 ENCOUNTER — TELEPHONE (OUTPATIENT)
Dept: INFECTIOUS DISEASES | Age: 38
End: 2023-05-26

## 2023-05-26 NOTE — TELEPHONE ENCOUNTER
Pt called in with health concerns. States he has a cold. Chest cold and running nose. States some dizziness. Encouraged pt to go to walk in clinic for further evaluation. He will think about it or \"stick it out\"   Again noted reccommended walk in clinic.

## 2023-05-31 DIAGNOSIS — R79.89 ELEVATED SERUM CREATININE: ICD-10-CM

## 2023-05-31 DIAGNOSIS — B20 CURRENTLY ASYMPTOMATIC HIV INFECTION, WITH HISTORY OF HIV-RELATED ILLNESS (HCC): ICD-10-CM

## 2023-05-31 DIAGNOSIS — B20 HUMAN IMMUNODEFICIENCY VIRUS (HCC): Primary | ICD-10-CM

## 2023-05-31 DIAGNOSIS — R91.8 LUNG NODULES: ICD-10-CM

## 2023-05-31 DIAGNOSIS — B20 HUMAN IMMUNODEFICIENCY VIRUS (HCC): ICD-10-CM

## 2023-05-31 LAB
ALBUMIN SERPL-MCNC: 4.4 G/DL (ref 3.5–4.6)
ALP SERPL-CCNC: 58 U/L (ref 35–104)
ALT SERPL-CCNC: 28 U/L (ref 0–41)
ANION GAP SERPL CALCULATED.3IONS-SCNC: 14 MEQ/L (ref 9–15)
AST SERPL-CCNC: 22 U/L (ref 0–40)
BILIRUB SERPL-MCNC: 0.5 MG/DL (ref 0.2–0.7)
BUN SERPL-MCNC: 20 MG/DL (ref 6–20)
CALCIUM SERPL-MCNC: 9.4 MG/DL (ref 8.5–9.9)
CHLORIDE SERPL-SCNC: 101 MEQ/L (ref 95–107)
CO2 SERPL-SCNC: 23 MEQ/L (ref 20–31)
CREAT SERPL-MCNC: 1.27 MG/DL (ref 0.7–1.2)
CRP SERPL HS-MCNC: 17.7 MG/L (ref 0–5)
ERYTHROCYTE [DISTWIDTH] IN BLOOD BY AUTOMATED COUNT: 14 % (ref 11.5–14.5)
GLOBULIN SER CALC-MCNC: 2.9 G/DL (ref 2.3–3.5)
GLUCOSE SERPL-MCNC: 79 MG/DL (ref 70–99)
HCT VFR BLD AUTO: 44.9 % (ref 42–52)
HGB BLD-MCNC: 15.4 G/DL (ref 14–18)
MCH RBC QN AUTO: 31 PG (ref 27–31.3)
MCHC RBC AUTO-ENTMCNC: 34.4 % (ref 33–37)
MCV RBC AUTO: 90.2 FL (ref 79–92.2)
PLATELET # BLD AUTO: 224 K/UL (ref 130–400)
POTASSIUM SERPL-SCNC: 4.1 MEQ/L (ref 3.4–4.9)
PROT SERPL-MCNC: 7.3 G/DL (ref 6.3–8)
RBC # BLD AUTO: 4.98 M/UL (ref 4.7–6.1)
RHEUMATOID FACTOR: <10 IU/ML
SODIUM SERPL-SCNC: 138 MEQ/L (ref 135–144)
WBC # BLD AUTO: 7.3 K/UL (ref 4.8–10.8)

## 2023-06-01 LAB
HIV QUANT LOG: 1.79 LOG CPY/ML
HIV-1 RNA BY PCR, QN: 62.3 CPY/ML
HIV-1 RNA BY PCR, QN: DETECTED
RPR SER QL: NORMAL
SOURCE: ABNORMAL

## 2023-06-03 LAB — NUCLEAR IGG SER QL IA: NORMAL

## 2023-06-08 ENCOUNTER — OFFICE VISIT (OUTPATIENT)
Dept: INFECTIOUS DISEASES | Age: 38
End: 2023-06-08

## 2023-06-08 VITALS
WEIGHT: 176.4 LBS | SYSTOLIC BLOOD PRESSURE: 130 MMHG | RESPIRATION RATE: 16 BRPM | BODY MASS INDEX: 22.64 KG/M2 | DIASTOLIC BLOOD PRESSURE: 83 MMHG | HEART RATE: 78 BPM | TEMPERATURE: 97.7 F | OXYGEN SATURATION: 98 % | HEIGHT: 74 IN

## 2023-06-08 DIAGNOSIS — B20 HUMAN IMMUNODEFICIENCY VIRUS (HCC): ICD-10-CM

## 2023-06-08 DIAGNOSIS — K02.9 DENTAL CARIES: ICD-10-CM

## 2023-06-08 DIAGNOSIS — R91.8 LUNG NODULES: ICD-10-CM

## 2023-06-08 DIAGNOSIS — R79.82 ELEVATED C-REACTIVE PROTEIN (CRP): ICD-10-CM

## 2023-06-08 DIAGNOSIS — B20 HUMAN IMMUNODEFICIENCY VIRUS (HCC): Primary | ICD-10-CM

## 2023-06-08 ASSESSMENT — PATIENT HEALTH QUESTIONNAIRE - PHQ9
2. FEELING DOWN, DEPRESSED OR HOPELESS: 0
SUM OF ALL RESPONSES TO PHQ QUESTIONS 1-9: 0
SUM OF ALL RESPONSES TO PHQ QUESTIONS 1-9: 0
SUM OF ALL RESPONSES TO PHQ9 QUESTIONS 1 & 2: 0
SUM OF ALL RESPONSES TO PHQ QUESTIONS 1-9: 0
SUM OF ALL RESPONSES TO PHQ QUESTIONS 1-9: 0
1. LITTLE INTEREST OR PLEASURE IN DOING THINGS: 0

## 2023-06-08 NOTE — PROGRESS NOTES
Routine b20  Error with CD4-- order given to pt. States he will complete this today. Importance reinforced. Smoking 0.5 PPD  Doing good. No partner issues  Partner trying to help pt quit  No etoh/drugs  Dentist 1/2023  Missed [facility] x 1 with in past 3 weeks  Had a longer period of missing while out of town. Working a lot and stressful. (Recent break in)     Per Dr Fredi Dunne pt needs to complete CT chest that was ordered last year. Requested this RN help with scheduling. CT chest scheduled for 6.20.23 at [address]--- pt aware of this apt. Discussed needed vaccines. Agreeable. Will go to . Pt requested to call HD himself and schedule vaccine apt around his work schedule. This RN did offer to assist patient with scheduling. Per Dr. Marcel Chawla patient is to complete the below checked vaccinations. Hep A/B __ ___X (A)____                                   HIB________  Meningococcal __ X___                               Shingles________  Rosy Coup 13 _____  Pneumovax 23 _____  Pneumovax 20 _X _____                         Tdap ____  COVID ____  HPV ____    Instructions reviewed with patient. Please call ---- [facility] Dept at [phone] or [phone] to schedule an appointment for vaccination. Location: ---- [address] [place] [address] Cover [id number]    Patient is to update office once vaccines are competed   Copy of vaccine order reviewed and signed pt patient. Copy of order given to pt  Education provided regarding all needed vaccines. *Faxed to __X____  *Appointment at -----patient to schedule       Order faxed to  with confirmation. Denies other concerns or issues that need physician attention. 6 month follow up scheduled.  pt is to call office If needed
0904    BUN 20 2023 0904    CREATININE 1.27 (H) 2023 0904        Component Value Date/Time    CALCIUM 9.4 2023 0904    ALKPHOS 58 2023 0904    AST 22 2023 0904    ALT 28 2023 0904    BILITOT 0.5 2023 0429        Patient has been informed of the importance of protection during sexual encounters which include, but are not limited to vaginal intercourse, anal intercourse, and oral sex. Patient has also been made aware that it is a felony in the  [facility] [address] to engage in a sexual encounter without first disclosing HIV status to partner. Assessment/Plan:  HIV infection - uncontrolled? Elevated CRP  Cutaneous abscesses - currently resolved  Hx MRSA  infection  Pulmonary nodules - no follow up - repeat CT chest still pending. CRP was likely elevated because he was sick at the time. Carmen Sethi sick around 23. RF negative. DRAKE negative. Missed about 2 weeks of medications. His uncle  and he had forgotten his meds during travel to explain detectable viral load. Plans before next visit:   CT chest follow up lung nodules. CD4  3 month follow up    Time spent today in combination of reviewing patient's chart, medications, labs, pictures when pertinent, provider communication as necessary, counseling patient, care/coordination not otherwise reported here, and patient face to face  >30 min.   >50% of that time spent counseling and coordination of patient care  Patient was also appropriately counseled on preventive measures such as vaccinations, the importance of annual exam with their PCP, and the importance of health maintenance by dietary and exercise regimens. All questions were answered from an ID perspective and to the best of my ability. Both the patient and the provider were located within the state in which the provider is licensed to practice.        Stacey Castro, DO

## 2023-06-09 LAB
CD3+CD4+ CELLS # BLD: 376 CELLS/UL (ref 430–1800)
CD4 % HELPER T CELL: 17 % (ref 32–64)
IMMUNODEFICIENCY MARKERS SPEC-IMP: ABNORMAL

## 2023-06-29 ENCOUNTER — TELEPHONE (OUTPATIENT)
Dept: INFECTIOUS DISEASES | Age: 38
End: 2023-06-29

## 2023-10-02 ENCOUNTER — TELEPHONE (OUTPATIENT)
Dept: INFECTIOUS DISEASES | Age: 38
End: 2023-10-02

## 2023-10-02 DIAGNOSIS — B20 HUMAN IMMUNODEFICIENCY VIRUS (HCC): Primary | ICD-10-CM

## 2023-10-12 ENCOUNTER — TELEPHONE (OUTPATIENT)
Dept: NEUROLOGY | Facility: CLINIC | Age: 38
End: 2023-10-12
Payer: COMMERCIAL

## 2023-10-12 DIAGNOSIS — F98.8 ADD (ATTENTION DEFICIT DISORDER) WITHOUT HYPERACTIVITY: Primary | ICD-10-CM

## 2023-10-12 RX ORDER — LISDEXAMFETAMINE DIMESYLATE 70 MG/1
70 CAPSULE ORAL EVERY MORNING
Qty: 30 CAPSULE | Refills: 0 | Status: SHIPPED | OUTPATIENT
Start: 2023-11-11 | End: 2023-10-16 | Stop reason: SDUPTHER

## 2023-10-12 RX ORDER — LISDEXAMFETAMINE DIMESYLATE 70 MG/1
70 CAPSULE ORAL EVERY MORNING
Qty: 30 CAPSULE | Refills: 0 | Status: SHIPPED | OUTPATIENT
Start: 2023-10-12 | End: 2023-10-16 | Stop reason: SDUPTHER

## 2023-10-12 NOTE — TELEPHONE ENCOUNTER
Spoke with Brown and his insurance will only cover generic vyvanse.  Can you please send to [facility] in [place].

## 2023-10-16 RX ORDER — LISDEXAMFETAMINE DIMESYLATE 70 MG/1
70 CAPSULE ORAL EVERY MORNING
Qty: 30 CAPSULE | Refills: 0 | Status: SHIPPED | OUTPATIENT
Start: 2023-11-15 | End: 2023-12-15

## 2023-10-16 RX ORDER — LISDEXAMFETAMINE DIMESYLATE 70 MG/1
70 CAPSULE ORAL EVERY MORNING
Qty: 30 CAPSULE | Refills: 0 | Status: SHIPPED | OUTPATIENT
Start: 2023-10-16 | End: 2023-10-31 | Stop reason: SDUPTHER

## 2023-10-18 ENCOUNTER — TELEPHONE (OUTPATIENT)
Dept: INFECTIOUS DISEASES | Age: 38
End: 2023-10-18

## 2023-10-18 NOTE — TELEPHONE ENCOUNTER
Pt called, was informed that insurance will no longer cover brand name Vyvanse, stated that he has side effects from the generic Vyvanse. CM informed pt that he may change insurance coverage via the Marketplace as of November 1, 2023. A possible alternate plan is for pt to continue with generic Vyvanse in meantime, report side effects to PCP. PCP may document side effects to insurance requesting brand name. CM will follow up with pt as needed.

## 2023-10-30 PROBLEM — J45.909 ASTHMA (HHS-HCC): Status: ACTIVE | Noted: 2023-10-30

## 2023-10-30 PROBLEM — I25.9 ISCHEMIC HEART DISEASE: Status: ACTIVE | Noted: 2023-10-30

## 2023-10-30 PROBLEM — J44.9 COPD (CHRONIC OBSTRUCTIVE PULMONARY DISEASE) (MULTI): Status: ACTIVE | Noted: 2023-10-30

## 2023-10-30 PROBLEM — R13.10 DYSPHAGIA: Status: ACTIVE | Noted: 2023-10-30

## 2023-10-30 PROBLEM — F41.9 ANXIETY: Status: ACTIVE | Noted: 2023-10-30

## 2023-10-30 PROBLEM — B20: Status: ACTIVE | Noted: 2023-10-30

## 2023-10-30 PROBLEM — R42 VERTIGO: Status: ACTIVE | Noted: 2023-10-30

## 2023-10-30 PROBLEM — R91.1 LUNG NODULE: Status: ACTIVE | Noted: 2023-10-30

## 2023-10-30 PROBLEM — F90.2 ADHD (ATTENTION DEFICIT HYPERACTIVITY DISORDER), COMBINED TYPE: Status: ACTIVE | Noted: 2023-10-30

## 2023-10-30 PROBLEM — S61.219A LACERATION OF FINGER: Status: ACTIVE | Noted: 2023-10-30

## 2023-10-30 PROBLEM — D64.9 ANEMIA: Status: ACTIVE | Noted: 2023-10-30

## 2023-10-30 PROBLEM — G93.40 ENCEPHALOPATHY: Status: ACTIVE | Noted: 2023-10-30

## 2023-10-30 PROBLEM — E55.9 VITAMIN D DEFICIENCY: Status: ACTIVE | Noted: 2023-10-30

## 2023-10-30 RX ORDER — SULFAMETHOXAZOLE AND TRIMETHOPRIM 800; 160 MG/1; MG/1
1 TABLET ORAL 2 TIMES DAILY
COMMUNITY
Start: 2022-12-19 | End: 2022-12-24

## 2023-10-30 RX ORDER — MECLIZINE HYDROCHLORIDE 25 MG/1
TABLET ORAL
Status: ON HOLD | COMMUNITY
Start: 2018-08-27 | End: 2023-11-30 | Stop reason: WASHOUT

## 2023-10-30 RX ORDER — HYDROCODONE BITARTRATE AND ACETAMINOPHEN 5; 325 MG/1; MG/1
1 TABLET ORAL EVERY 6 HOURS PRN
COMMUNITY
Start: 2022-12-22

## 2023-10-30 RX ORDER — IBUPROFEN 800 MG/1
TABLET ORAL
COMMUNITY
Start: 2017-08-09 | End: 2023-12-03 | Stop reason: HOSPADM

## 2023-10-30 RX ORDER — AMOXICILLIN AND CLAVULANATE POTASSIUM 875; 125 MG/1; MG/1
1 TABLET, FILM COATED ORAL EVERY 12 HOURS
COMMUNITY
Start: 2022-12-22 | End: 2022-12-29

## 2023-10-30 RX ORDER — CLINDAMYCIN HYDROCHLORIDE 150 MG/1
CAPSULE ORAL
Status: ON HOLD | COMMUNITY
Start: 2022-12-19 | End: 2023-11-30 | Stop reason: ALTCHOICE

## 2023-10-30 RX ORDER — TIOTROPIUM BROMIDE AND OLODATEROL 3.124; 2.736 UG/1; UG/1
SPRAY, METERED RESPIRATORY (INHALATION)
Status: ON HOLD | COMMUNITY
Start: 2017-07-06 | End: 2023-11-30 | Stop reason: WASHOUT

## 2023-10-30 RX ORDER — ALBUTEROL SULFATE 90 UG/1
AEROSOL, METERED RESPIRATORY (INHALATION)
Status: ON HOLD | COMMUNITY
Start: 2015-02-20 | End: 2023-11-30 | Stop reason: WASHOUT

## 2023-10-30 RX ORDER — LISDEXAMFETAMINE DIMESYLATE 50 MG/1
50 CAPSULE ORAL DAILY
COMMUNITY
Start: 2023-09-13 | End: 2023-10-31

## 2023-10-30 RX ORDER — MAGNESIUM HYDROXIDE 400 MG/5ML
SUSPENSION, ORAL (FINAL DOSE FORM) ORAL
COMMUNITY
Start: 2022-12-28

## 2023-10-30 RX ORDER — MUPIROCIN 20 MG/G
OINTMENT TOPICAL
COMMUNITY
Start: 2022-12-28

## 2023-10-30 RX ORDER — LISDEXAMFETAMINE DIMESYLATE 70 MG/1
CAPSULE ORAL
COMMUNITY
Start: 2020-01-28 | End: 2023-10-31 | Stop reason: SDUPTHER

## 2023-10-30 RX ORDER — SUMATRIPTAN SUCCINATE 100 MG/1
TABLET ORAL
COMMUNITY
Start: 2015-01-13 | End: 2023-10-31 | Stop reason: SDUPTHER

## 2023-10-30 RX ORDER — SILDENAFIL 50 MG/1
TABLET, FILM COATED ORAL
Status: ON HOLD | COMMUNITY
Start: 2022-11-28 | End: 2023-11-30 | Stop reason: WASHOUT

## 2023-10-30 RX ORDER — ESCITALOPRAM OXALATE 10 MG/1
10 TABLET ORAL DAILY
COMMUNITY
Start: 2023-05-15

## 2023-10-30 RX ORDER — CHLORHEXIDINE GLUCONATE ORAL RINSE 1.2 MG/ML
SOLUTION DENTAL
Status: ON HOLD | COMMUNITY
Start: 2023-01-06 | End: 2023-11-30 | Stop reason: WASHOUT

## 2023-10-30 RX ORDER — DEXMETHYLPHENIDATE HYDROCHLORIDE 30 MG/1
30 CAPSULE, EXTENDED RELEASE ORAL DAILY
COMMUNITY
Start: 2023-08-29 | End: 2023-10-31

## 2023-10-30 RX ORDER — AMOXICILLIN 500 MG/1
500 CAPSULE ORAL 3 TIMES DAILY
COMMUNITY
Start: 2023-01-06 | End: 2023-01-13

## 2023-10-30 RX ORDER — ALBUTEROL SULFATE 0.83 MG/ML
SOLUTION RESPIRATORY (INHALATION)
Status: ON HOLD | COMMUNITY
Start: 2016-05-28 | End: 2023-11-30 | Stop reason: WASHOUT

## 2023-10-31 ENCOUNTER — OFFICE VISIT (OUTPATIENT)
Dept: NEUROLOGY | Facility: CLINIC | Age: 38
End: 2023-10-31
Payer: COMMERCIAL

## 2023-10-31 VITALS
DIASTOLIC BLOOD PRESSURE: 79 MMHG | HEART RATE: 85 BPM | BODY MASS INDEX: 24.25 KG/M2 | HEIGHT: 73 IN | WEIGHT: 183 LBS | SYSTOLIC BLOOD PRESSURE: 114 MMHG

## 2023-10-31 DIAGNOSIS — F98.8 ADD (ATTENTION DEFICIT DISORDER) WITHOUT HYPERACTIVITY: ICD-10-CM

## 2023-10-31 DIAGNOSIS — G93.40 ENCEPHALOPATHY: Primary | ICD-10-CM

## 2023-10-31 DIAGNOSIS — G43.009 MIGRAINE WITHOUT AURA AND WITHOUT STATUS MIGRAINOSUS, NOT INTRACTABLE: ICD-10-CM

## 2023-10-31 PROCEDURE — 99214 OFFICE O/P EST MOD 30 MIN: CPT | Performed by: PSYCHIATRY & NEUROLOGY

## 2023-10-31 RX ORDER — LISDEXAMFETAMINE DIMESYLATE 70 MG/1
70 CAPSULE ORAL
Qty: 30 CAPSULE | Refills: 0 | Status: SHIPPED | OUTPATIENT
Start: 2023-12-15 | End: 2024-02-21 | Stop reason: SDUPTHER

## 2023-10-31 RX ORDER — CEPHALEXIN 500 MG/1
1 CAPSULE ORAL 3 TIMES DAILY
Status: ON HOLD | COMMUNITY
Start: 2020-04-01 | End: 2023-11-30 | Stop reason: WASHOUT

## 2023-10-31 RX ORDER — SUMATRIPTAN SUCCINATE 100 MG/1
100 TABLET ORAL ONCE AS NEEDED
Qty: 9 TABLET | Refills: 3 | Status: SHIPPED | OUTPATIENT
Start: 2023-10-31 | End: 2024-02-27 | Stop reason: SDUPTHER

## 2023-10-31 RX ORDER — ABACAVIR SULFATE, DOLUTEGRAVIR SODIUM, LAMIVUDINE 600; 50; 300 MG/1; MG/1; MG/1
1 TABLET, FILM COATED ORAL DAILY
COMMUNITY
Start: 2019-02-25

## 2023-10-31 RX ORDER — CYANOCOBALAMIN/FOLIC AC/VIT B6 1-2.5-25MG
1 TABLET ORAL DAILY
Status: ON HOLD | COMMUNITY
End: 2023-11-30 | Stop reason: WASHOUT

## 2023-10-31 RX ORDER — LISDEXAMFETAMINE DIMESYLATE 70 MG/1
70 CAPSULE ORAL EVERY MORNING
Qty: 30 CAPSULE | Refills: 0 | Status: SHIPPED | OUTPATIENT
Start: 2024-01-15 | End: 2024-02-14

## 2023-10-31 RX ORDER — CHOLECALCIFEROL (VITAMIN D3) 25 MCG
1000 TABLET ORAL DAILY
Status: ON HOLD | COMMUNITY
End: 2023-11-30 | Stop reason: WASHOUT

## 2023-10-31 ASSESSMENT — ENCOUNTER SYMPTOMS
SLEEP DISTURBANCE: 0
NAUSEA: 0
ARTHRALGIAS: 0
PALPITATIONS: 0
COUGH: 0
WEAKNESS: 0
FREQUENCY: 0
TROUBLE SWALLOWING: 0
DIZZINESS: 0
AGITATION: 0
ABDOMINAL PAIN: 0
FACIAL ASYMMETRY: 0
FEVER: 0
CONFUSION: 0
NECK STIFFNESS: 0
DIFFICULTY URINATING: 0
TREMORS: 0
NUMBNESS: 0
FATIGUE: 0
ADENOPATHY: 0
SPEECH DIFFICULTY: 0
SEIZURES: 0
LIGHT-HEADEDNESS: 1
NECK PAIN: 0
UNEXPECTED WEIGHT CHANGE: 0
SINUS PRESSURE: 0
JOINT SWELLING: 0
PHOTOPHOBIA: 0
HEADACHES: 1
SHORTNESS OF BREATH: 0
VOMITING: 0
BRUISES/BLEEDS EASILY: 0
EYE PAIN: 0
HALLUCINATIONS: 0
WHEEZING: 0
BACK PAIN: 0
HYPERACTIVE: 0

## 2023-10-31 ASSESSMENT — PATIENT HEALTH QUESTIONNAIRE - PHQ9
SUM OF ALL RESPONSES TO PHQ9 QUESTIONS 1 & 2: 0
2. FEELING DOWN, DEPRESSED OR HOPELESS: NOT AT ALL
1. LITTLE INTEREST OR PLEASURE IN DOING THINGS: NOT AT ALL

## 2023-10-31 NOTE — PROGRESS NOTES
Mathieu Kelly  38 y.o.       SUBJECTIVE    ADHD  Associated symptoms include headaches. Pertinent negatives include no abdominal pain, arthralgias, chest pain, coughing, fatigue, fever, joint swelling, nausea, neck pain, numbness, rash, vomiting or weakness.    Mathieu Kelly 28-year-old young man who was seen today for follow-up of his encephalopathy due to attention deficit disorder with recurrent migraine headache.  Since last seen he has done very well on Vyvanse 70 mg daily and takes Imitrex only when he needed for his migraine headache.  No side effects of the medication.  Today's physical and neurological examination was normal but I would like to continue all his medication the way he is taking and have discussed the headache diary food diary and the precautions to be taken and depending on how he does I might make future recommendation when he comes back to see me in 3 months.    I have discussed the controlled substance policy, abusive potential, risk benefit and the precautions to be taken which she understood.    Due to technical limitations of voice recognition and human error, this note may not accurately reflect the care of the patient.    Review of Systems   Constitutional:  Negative for fatigue, fever and unexpected weight change.   HENT:  Negative for dental problem, ear pain, hearing loss, sinus pressure, tinnitus and trouble swallowing.    Eyes:  Negative for photophobia, pain and visual disturbance.   Respiratory:  Negative for cough, shortness of breath and wheezing.    Cardiovascular:  Negative for chest pain, palpitations and leg swelling.   Gastrointestinal:  Negative for abdominal pain, nausea and vomiting.   Genitourinary:  Negative for difficulty urinating, enuresis and frequency.   Musculoskeletal:  Negative for arthralgias, back pain, joint swelling, neck pain and neck stiffness.   Skin:  Negative for pallor and rash.   Allergic/Immunologic: Negative for food allergies.  "  Neurological:  Positive for light-headedness and headaches. Negative for dizziness, tremors, seizures, syncope, facial asymmetry, speech difficulty, weakness and numbness.   Hematological:  Negative for adenopathy. Does not bruise/bleed easily.   Psychiatric/Behavioral:  Negative for agitation, behavioral problems, confusion, hallucinations and sleep disturbance. The patient is not hyperactive.         Patient Active Problem List   Diagnosis    ADHD (attention deficit hyperactivity disorder), combined type    Anemia    Anxiety    Asthma    COPD (chronic obstructive pulmonary disease) (CMS/[facility])    Dysphagia    Encephalopathy    HIV-1 (human immunodeficiency virus I) (CMS/[facility])    Ischemic heart disease    Laceration of finger    Lung nodule    Vertigo    Vitamin D deficiency     Past Medical History:   Diagnosis Date    Personal history of other diseases of the nervous system and sense organs     History of migraine    Personal history of other mental and behavioral disorders     History of attention deficit hyperactivity disorder (ADHD)     Past Surgical History:   Procedure Laterality Date    LUNG SURGERY  05/18/2017    Lung Surgery    OTHER SURGICAL HISTORY  05/18/2017    Oral Surgery Tooth Extraction [facility]       reports that he has been smoking cigarettes. He has been smoking an average of .5 packs per day. He has never used smokeless tobacco. Alcohol use questions deferred to the physician. Drug use questions deferred to the physician.    /79   Pulse 85   Ht 1.854 m (6' 1\")   Wt 83 kg (183 lb)   BMI 24.14 kg/m²     OBJECTIVE  Physical Exam/Neurological Exam   Constitutional: General appearance: no acute distress   Auscultation of Heart: Regular rate and rhythm, no murmurs, normal S1 and S2.   Carotid Arteries: Intact without any bruits.   Neck is supple.   No lymph adenopathy.   Peripheral Vascular Exam: Pulses +2 and equal in all extremities. No swelling, varicosities, edema or tenderness to " palpations.    Abdomen is soft, nondistended. No organomegaly.  Mental status: The patient was in no distress, alert, interactive and cooperative. Affect is appropriate.   Orientation: oriented to person, oriented to place and oriented to time.   Memory: recent memory intact and remote memory intact.   Attention: normal attention span and normal concentrating ability.   Language: normal comprehension and no difficulty naming common objects.   Fund of knowledge: Patient displays adequate knowledge of current events, adequate fund of knowledge regarding past history and adequate fund of knowledge regarding vocabulary.   Eyes: The ophthalmoscopic examination was normal. The fundi are visualized with normal disc margins and without.  Cranial nerve II: Visual fields full to confrontation.   Cranial nerves III, IV, and VI: Pupils round, equally reactive to light; no ptosis. EOMs intact. No nystagmus.   Cranial Nerve V: Facial sensation intact bilaterally.   Cranial nerve VII: Normal and symmetric facial strength.   Cranial nerve VIII: Hearing is intact bilaterally to finger rub / whisper.   Cranial nerves IX and X: Palate elevates symmetrically.   Cranial nerve XI: Shoulder shrug and neck rotation strength are intact.   Cranial nerve XII: Tongue midline with normal strength.   Motor: Motor exam was normal. Muscle bulk was normal in both upper and lower extremities. Muscle tone was normal in both upper and lower extremities. Muscle strength was 5/5 throughout. no abnormal or adventitious movements were present.   Deep Tendon Reflexes: left biceps 2+ , right biceps 2+, left triceps 2+, right triceps 2+, left brachioradialis 2+, right brachioradialis 2+, left patella 2+, right patella 2+, left ankle jerk 2+, right ankle jerk 2+   Plantar Reflex: Toes downgoing to plantar stimulation on the left. Toes downgoing to plantar stimulation on the right.   Sensory Exam: Normal to light touch.   Coordination: There is no limb  dystaxia and rapid alternating movements are intact.  Gait: Gait is normal without spasticity, ataxia or bradykinesia. Stance is stable with a negative Romberg.      ASSESSMENT/PLAN  Diagnoses and all orders for this visit:  Encephalopathy  ADD (attention deficit disorder) without hyperactivity  -     lisdexamfetamine (Vyvanse) 70 mg capsule; Take 1 capsule (70 mg) by mouth once daily. Do not start before December 15, 2023.  -     lisdexamfetamine (Vyvanse) 70 mg capsule; Take 1 capsule (70 mg) by mouth once daily in the morning. Do not start before January 15, 2024.  Migraine without aura and without status migrainosus, not intractable  -     SUMAtriptan (Imitrex) 100 mg tablet; Take 1 tablet (100 mg) by mouth 1 time if needed for migraine.        Ron Ferraro MD  10/31/2023  1:55 PM

## 2023-11-03 NOTE — TELEPHONE ENCOUNTER
Need for rescheduling. Not able to get labs and works. Rescheduled 1.25.24    Discussed need for ODAP update. Scheduled 11.16.23  Discussed needed documentation    Labs prior to end of year. He verbalized understanding.

## 2023-11-16 ENCOUNTER — NURSE ONLY (OUTPATIENT)
Dept: INFECTIOUS DISEASES | Age: 38
End: 2023-11-16

## 2023-11-16 NOTE — PROGRESS NOTES
Re-Assessment      Patient ID:   Norah Zepeda  Date:   11/16/2023      Client ID:  [id number]    [address]  [phone] (home)     Family/House:    [] Partner  [] Children  [] Other -     Mental Health:   Hx of Anxiety    Substance Abuse:   NA  Social History     Socioeconomic History    Marital status: Single     Spouse name: Not on file    Number of children: Not on file    Years of education: Not on file    Highest education level: Not on file   Occupational History    Not on file   Tobacco Use    Smoking status: Every Day     Packs/day: .25     Types: Cigarettes    Smokeless tobacco: Never    Tobacco comments:     cut down to 4 cig. /day   Vaping Use    Vaping Use: Never used   Substance and Sexual Activity    Alcohol use: No    Drug use: No    Sexual activity: Yes     Partners: Male   Other Topics Concern    Not on file   Social History Narrative    Not on file     Social Determinants of Health     Financial Resource Strain: Not on file   Food Insecurity: Not on file   Transportation Needs: Not on file   Physical Activity: Not on file   Stress: Not on file   Social Connections: Not on file   Intimate Partner Violence: Not on file   Housing Stability: Not on file       Housing:   home    Health/Healthcare:  HIV Stable   Physician Visit:  Dr. Fuentes Claros 6/23   Dental Visit:  Mariella Florian 2/23    CD4:   Lab Results   Component Value Date/Time    LABABSO 376 06/08/2023 11:37 AM       Viral Load:  Lab Results   Component Value Date/Time    LLG6YRHBYNM DETECTED 05/31/2023 09:04 AM    WHH3FBIDGP 62.3 05/31/2023 09:04 AM    AKS2AHLAZJ <1.47 10/27/2022 09:03 AM       Medical Insurance:  Payor: Arlene Query / Plan: Dionna Britton STANLEY / Product Type: *No Product type* /    OHDAP/HIPSCA:  OHDAP   Renewal Date:  11/16/23    Income:    Employment    Legal Issues:    NA     Emergency Contact:   Extended Emergency Contact Information  Primary Emergency Contact: [facility]

## 2023-11-29 ENCOUNTER — HOSPITAL ENCOUNTER (INPATIENT)
Facility: HOSPITAL | Age: 38
LOS: 4 days | Discharge: HOME | DRG: 603 | End: 2023-12-03
Attending: STUDENT IN AN ORGANIZED HEALTH CARE EDUCATION/TRAINING PROGRAM | Admitting: INTERNAL MEDICINE
Payer: COMMERCIAL

## 2023-11-29 ENCOUNTER — APPOINTMENT (OUTPATIENT)
Dept: RADIOLOGY | Facility: HOSPITAL | Age: 38
DRG: 603 | End: 2023-11-29
Payer: COMMERCIAL

## 2023-11-29 ENCOUNTER — HOSPITAL ENCOUNTER (EMERGENCY)
Age: 38
Discharge: HOME | End: 2023-11-29
Payer: COMMERCIAL

## 2023-11-29 ENCOUNTER — TELEPHONE (OUTPATIENT)
Dept: INFECTIOUS DISEASES | Age: 38
End: 2023-11-29

## 2023-11-29 VITALS
HEART RATE: 112 BPM | OXYGEN SATURATION: 100 % | DIASTOLIC BLOOD PRESSURE: 90 MMHG | SYSTOLIC BLOOD PRESSURE: 145 MMHG | TEMPERATURE: 97.7 F | RESPIRATION RATE: 20 BRPM

## 2023-11-29 DIAGNOSIS — L03.114 CELLULITIS OF LEFT ARM: ICD-10-CM

## 2023-11-29 DIAGNOSIS — L02.91 ABSCESS OF SKIN WITH LYMPHANGITIS: Primary | ICD-10-CM

## 2023-11-29 DIAGNOSIS — L02.512 ABSCESS OF LEFT HAND: ICD-10-CM

## 2023-11-29 DIAGNOSIS — R50.9 FEVER IN ADULT: ICD-10-CM

## 2023-11-29 DIAGNOSIS — Z78.9 FAILURE OF OUTPATIENT TREATMENT: Primary | ICD-10-CM

## 2023-11-29 LAB
ALBUMIN SERPL BCP-MCNC: 4 G/DL (ref 3.4–5)
ALBUMIN SERPL-MCNC: 4.1 G/DL (ref 3.5–4.6)
ALP SERPL-CCNC: 46 U/L (ref 33–120)
ALP SERPL-CCNC: 58 U/L (ref 35–104)
ALT SERPL W P-5'-P-CCNC: 18 U/L (ref 10–52)
ALT SERPL-CCNC: 20 U/L (ref 0–41)
ANION GAP SERPL CALC-SCNC: 12 MMOL/L (ref 10–20)
ANION GAP SERPL CALCULATED.3IONS-SCNC: 9 MEQ/L (ref 9–15)
AST SERPL W P-5'-P-CCNC: 22 U/L (ref 9–39)
AST SERPL-CCNC: 23 U/L (ref 0–40)
BASOPHILS # BLD AUTO: 0.03 X10*3/UL (ref 0–0.1)
BASOPHILS # BLD: 0 K/UL (ref 0–0.2)
BASOPHILS NFR BLD AUTO: 0.3 %
BASOPHILS NFR BLD: 0.3 %
BILIRUB SERPL-MCNC: 0.5 MG/DL (ref 0.2–0.7)
BILIRUB SERPL-MCNC: 0.9 MG/DL (ref 0–1.2)
BUN SERPL-MCNC: 15 MG/DL (ref 6–23)
BUN SERPL-MCNC: 19 MG/DL (ref 6–20)
CALCIUM SERPL-MCNC: 8.9 MG/DL (ref 8.6–10.3)
CALCIUM SERPL-MCNC: 9.1 MG/DL (ref 8.5–9.9)
CHLORIDE SERPL-SCNC: 101 MEQ/L (ref 95–107)
CHLORIDE SERPL-SCNC: 103 MMOL/L (ref 98–107)
CO2 SERPL-SCNC: 23 MMOL/L (ref 21–32)
CO2 SERPL-SCNC: 27 MEQ/L (ref 20–31)
CREAT SERPL-MCNC: 1.09 MG/DL (ref 0.7–1.2)
CREAT SERPL-MCNC: 1.27 MG/DL (ref 0.5–1.3)
CRP SERPL-MCNC: 9.79 MG/DL
EOSINOPHIL # BLD AUTO: 0.01 X10*3/UL (ref 0–0.7)
EOSINOPHIL # BLD: 0.1 K/UL (ref 0–0.7)
EOSINOPHIL NFR BLD AUTO: 0.1 %
EOSINOPHIL NFR BLD: 0.6 %
ERYTHROCYTE [DISTWIDTH] IN BLOOD BY AUTOMATED COUNT: 13.2 % (ref 11.5–14.5)
ERYTHROCYTE [DISTWIDTH] IN BLOOD BY AUTOMATED COUNT: 13.3 % (ref 11.5–14.5)
ERYTHROCYTE [SEDIMENTATION RATE] IN BLOOD BY WESTERGREN METHOD: 16 MM/H (ref 0–15)
GFR SERPL CREATININE-BSD FRML MDRD: 74 ML/MIN/1.73M*2
GLOBULIN SER CALC-MCNC: 2.8 G/DL (ref 2.3–3.5)
GLUCOSE SERPL-MCNC: 112 MG/DL (ref 74–99)
GLUCOSE SERPL-MCNC: 116 MG/DL (ref 70–99)
HCT VFR BLD AUTO: 37.7 % (ref 41–52)
HCT VFR BLD AUTO: 43.8 % (ref 42–52)
HGB BLD-MCNC: 13 G/DL (ref 13.5–17.5)
HGB BLD-MCNC: 14.5 G/DL (ref 14–18)
IMM GRANULOCYTES # BLD AUTO: 0.04 X10*3/UL (ref 0–0.7)
IMM GRANULOCYTES NFR BLD AUTO: 0.4 % (ref 0–0.9)
LACTATE SERPL-SCNC: 0.8 MMOL/L (ref 0.4–2)
LYMPHOCYTES # BLD AUTO: 2.1 X10*3/UL (ref 1.2–4.8)
LYMPHOCYTES # BLD: 1.3 K/UL (ref 1–4.8)
LYMPHOCYTES NFR BLD AUTO: 21.1 %
LYMPHOCYTES NFR BLD: 13.5 %
MCH RBC QN AUTO: 30.5 PG (ref 27–31.3)
MCH RBC QN AUTO: 31 PG (ref 26–34)
MCHC RBC AUTO-ENTMCNC: 33.1 % (ref 33–37)
MCHC RBC AUTO-ENTMCNC: 34.5 G/DL (ref 32–36)
MCV RBC AUTO: 90 FL (ref 80–100)
MCV RBC AUTO: 92.2 FL (ref 79–92.2)
MONOCYTES # BLD AUTO: 0.81 X10*3/UL (ref 0.1–1)
MONOCYTES # BLD: 1 K/UL (ref 0.2–0.8)
MONOCYTES NFR BLD AUTO: 8.2 %
MONOCYTES NFR BLD: 10.6 %
NEUTROPHILS # BLD AUTO: 6.94 X10*3/UL (ref 1.2–7.7)
NEUTROPHILS # BLD: 7 K/UL (ref 1.4–6.5)
NEUTROPHILS NFR BLD AUTO: 69.9 %
NEUTS SEG NFR BLD: 74.7 %
NRBC BLD-RTO: 0 /100 WBCS (ref 0–0)
PLATELET # BLD AUTO: 186 X10*3/UL (ref 150–450)
PLATELET # BLD AUTO: 215 K/UL (ref 130–400)
POTASSIUM SERPL-SCNC: 3.6 MMOL/L (ref 3.5–5.3)
POTASSIUM SERPL-SCNC: 3.8 MEQ/L (ref 3.4–4.9)
PROT SERPL-MCNC: 6.8 G/DL (ref 6.4–8.2)
PROT SERPL-MCNC: 6.9 G/DL (ref 6.3–8)
RBC # BLD AUTO: 4.2 X10*6/UL (ref 4.5–5.9)
RBC # BLD AUTO: 4.75 M/UL (ref 4.7–6.1)
SODIUM SERPL-SCNC: 134 MMOL/L (ref 136–145)
SODIUM SERPL-SCNC: 137 MEQ/L (ref 135–144)
WBC # BLD AUTO: 9.4 K/UL (ref 4.8–10.8)
WBC # BLD AUTO: 9.9 X10*3/UL (ref 4.4–11.3)

## 2023-11-29 PROCEDURE — 99284 EMERGENCY DEPT VISIT MOD MDM: CPT

## 2023-11-29 PROCEDURE — 99285 EMERGENCY DEPT VISIT HI MDM: CPT | Performed by: STUDENT IN AN ORGANIZED HEALTH CARE EDUCATION/TRAINING PROGRAM

## 2023-11-29 PROCEDURE — 36415 COLL VENOUS BLD VENIPUNCTURE: CPT

## 2023-11-29 PROCEDURE — 1210000001 HC SEMI-PRIVATE ROOM DAILY

## 2023-11-29 PROCEDURE — 87075 CULTR BACTERIA EXCEPT BLOOD: CPT

## 2023-11-29 PROCEDURE — 6370000000 HC RX 637 (ALT 250 FOR IP): Performed by: PERSONAL EMERGENCY RESPONSE ATTENDANT

## 2023-11-29 PROCEDURE — 80053 COMPREHEN METABOLIC PANEL: CPT

## 2023-11-29 PROCEDURE — 87070 CULTURE OTHR SPECIMN AEROBIC: CPT | Mod: ELYLAB

## 2023-11-29 PROCEDURE — 85025 COMPLETE CBC W/AUTO DIFF WBC: CPT

## 2023-11-29 PROCEDURE — 2500000004 HC RX 250 GENERAL PHARMACY W/ HCPCS (ALT 636 FOR OP/ED)

## 2023-11-29 PROCEDURE — 96365 THER/PROPH/DIAG IV INF INIT: CPT

## 2023-11-29 PROCEDURE — 6360000002 HC RX W HCPCS: Performed by: PERSONAL EMERGENCY RESPONSE ATTENDANT

## 2023-11-29 PROCEDURE — 73130 X-RAY EXAM OF HAND: CPT | Mod: LT,FY

## 2023-11-29 PROCEDURE — 96375 TX/PRO/DX INJ NEW DRUG ADDON: CPT

## 2023-11-29 PROCEDURE — 85652 RBC SED RATE AUTOMATED: CPT

## 2023-11-29 PROCEDURE — 99222 1ST HOSP IP/OBS MODERATE 55: CPT | Performed by: NURSE PRACTITIONER

## 2023-11-29 PROCEDURE — 86140 C-REACTIVE PROTEIN: CPT

## 2023-11-29 PROCEDURE — 87186 SC STD MICRODIL/AGAR DIL: CPT

## 2023-11-29 PROCEDURE — 2580000003 HC RX 258: Performed by: PERSONAL EMERGENCY RESPONSE ATTENDANT

## 2023-11-29 PROCEDURE — 73130 X-RAY EXAM OF HAND: CPT | Mod: LEFT SIDE | Performed by: RADIOLOGY

## 2023-11-29 PROCEDURE — 87040 BLOOD CULTURE FOR BACTERIA: CPT

## 2023-11-29 PROCEDURE — 87186 SC STD MICRODIL/AGAR DIL: CPT | Mod: ELYLAB

## 2023-11-29 PROCEDURE — 0H9GXZZ DRAINAGE OF LEFT HAND SKIN, EXTERNAL APPROACH: ICD-10-PCS | Performed by: STUDENT IN AN ORGANIZED HEALTH CARE EDUCATION/TRAINING PROGRAM

## 2023-11-29 PROCEDURE — 84520 ASSAY OF UREA NITROGEN: CPT

## 2023-11-29 PROCEDURE — 96366 THER/PROPH/DIAG IV INF ADDON: CPT

## 2023-11-29 PROCEDURE — 87070 CULTURE OTHR SPECIMN AEROBIC: CPT

## 2023-11-29 PROCEDURE — 83605 ASSAY OF LACTIC ACID: CPT

## 2023-11-29 PROCEDURE — 87075 CULTR BACTERIA EXCEPT BLOOD: CPT | Mod: ELYLAB

## 2023-11-29 RX ORDER — ACETAMINOPHEN 160 MG/5ML
650 SOLUTION ORAL EVERY 4 HOURS PRN
Status: DISCONTINUED | OUTPATIENT
Start: 2023-11-29 | End: 2023-12-03 | Stop reason: HOSPADM

## 2023-11-29 RX ORDER — ACETAMINOPHEN 325 MG/1
650 TABLET ORAL EVERY 4 HOURS PRN
Status: DISCONTINUED | OUTPATIENT
Start: 2023-11-29 | End: 2023-12-03 | Stop reason: HOSPADM

## 2023-11-29 RX ORDER — METRONIDAZOLE 500 MG/100ML
500 INJECTION, SOLUTION INTRAVENOUS ONCE
Status: COMPLETED | OUTPATIENT
Start: 2023-11-29 | End: 2023-11-29

## 2023-11-29 RX ORDER — OXYCODONE HYDROCHLORIDE AND ACETAMINOPHEN 5; 325 MG/1; MG/1
1 TABLET ORAL EVERY 6 HOURS PRN
Qty: 12 TABLET | Refills: 0 | Status: SHIPPED | OUTPATIENT
Start: 2023-11-29 | End: 2023-12-02

## 2023-11-29 RX ORDER — CEPHALEXIN 500 MG/1
1000 CAPSULE ORAL 2 TIMES DAILY
Qty: 40 CAPSULE | Refills: 0 | Status: SHIPPED | OUTPATIENT
Start: 2023-11-29 | End: 2023-12-09

## 2023-11-29 RX ORDER — OXYCODONE HYDROCHLORIDE AND ACETAMINOPHEN 5; 325 MG/1; MG/1
1 TABLET ORAL ONCE
Status: COMPLETED | OUTPATIENT
Start: 2023-11-29 | End: 2023-11-29

## 2023-11-29 RX ORDER — LIDOCAINE AND PRILOCAINE 25; 25 MG/G; MG/G
CREAM TOPICAL ONCE
Status: COMPLETED | OUTPATIENT
Start: 2023-11-29 | End: 2023-11-29

## 2023-11-29 RX ORDER — SULFAMETHOXAZOLE AND TRIMETHOPRIM 800; 160 MG/1; MG/1
1 TABLET ORAL ONCE
Status: COMPLETED | OUTPATIENT
Start: 2023-11-29 | End: 2023-11-29

## 2023-11-29 RX ORDER — ONDANSETRON HYDROCHLORIDE 2 MG/ML
4 INJECTION, SOLUTION INTRAVENOUS EVERY 8 HOURS PRN
Status: DISCONTINUED | OUTPATIENT
Start: 2023-11-29 | End: 2023-12-03 | Stop reason: HOSPADM

## 2023-11-29 RX ORDER — MORPHINE SULFATE 2 MG/ML
2 INJECTION, SOLUTION INTRAMUSCULAR; INTRAVENOUS EVERY 4 HOURS PRN
Status: DISCONTINUED | OUTPATIENT
Start: 2023-11-29 | End: 2023-11-30

## 2023-11-29 RX ORDER — DOCUSATE SODIUM 100 MG/1
100 CAPSULE, LIQUID FILLED ORAL 2 TIMES DAILY
Status: DISCONTINUED | OUTPATIENT
Start: 2023-11-29 | End: 2023-12-03 | Stop reason: HOSPADM

## 2023-11-29 RX ORDER — ONDANSETRON 4 MG/1
4 TABLET, FILM COATED ORAL EVERY 8 HOURS PRN
Status: DISCONTINUED | OUTPATIENT
Start: 2023-11-29 | End: 2023-12-03 | Stop reason: HOSPADM

## 2023-11-29 RX ORDER — MORPHINE SULFATE 4 MG/ML
4 INJECTION, SOLUTION INTRAMUSCULAR; INTRAVENOUS ONCE
Status: COMPLETED | OUTPATIENT
Start: 2023-11-29 | End: 2023-11-29

## 2023-11-29 RX ORDER — SULFAMETHOXAZOLE AND TRIMETHOPRIM 800; 160 MG/1; MG/1
1 TABLET ORAL 2 TIMES DAILY
Qty: 20 TABLET | Refills: 0 | Status: SHIPPED | OUTPATIENT
Start: 2023-11-29 | End: 2023-12-09

## 2023-11-29 RX ORDER — ACETAMINOPHEN 650 MG/1
650 SUPPOSITORY RECTAL EVERY 4 HOURS PRN
Status: DISCONTINUED | OUTPATIENT
Start: 2023-11-29 | End: 2023-12-03 | Stop reason: HOSPADM

## 2023-11-29 RX ORDER — BACITRACIN ZINC 500 [USP'U]/G
OINTMENT TOPICAL ONCE
Status: COMPLETED | OUTPATIENT
Start: 2023-11-29 | End: 2023-11-29

## 2023-11-29 RX ORDER — TALC
3 POWDER (GRAM) TOPICAL DAILY
Status: DISCONTINUED | OUTPATIENT
Start: 2023-11-29 | End: 2023-12-03 | Stop reason: HOSPADM

## 2023-11-29 RX ORDER — 0.9 % SODIUM CHLORIDE 0.9 %
1000 INTRAVENOUS SOLUTION INTRAVENOUS ONCE
Status: COMPLETED | OUTPATIENT
Start: 2023-11-29 | End: 2023-11-29

## 2023-11-29 RX ORDER — MEROPENEM 1 G/1
1 INJECTION, POWDER, FOR SOLUTION INTRAVENOUS EVERY 8 HOURS
Status: DISCONTINUED | OUTPATIENT
Start: 2023-11-29 | End: 2023-11-30

## 2023-11-29 RX ORDER — ONDANSETRON 2 MG/ML
4 INJECTION INTRAMUSCULAR; INTRAVENOUS ONCE
Status: COMPLETED | OUTPATIENT
Start: 2023-11-29 | End: 2023-11-29

## 2023-11-29 RX ORDER — OXYCODONE AND ACETAMINOPHEN 5; 325 MG/1; MG/1
1 TABLET ORAL EVERY 6 HOURS PRN
Status: CANCELLED | OUTPATIENT
Start: 2023-11-29

## 2023-11-29 RX ADMIN — BACITRACIN ZINC: 500 OINTMENT TOPICAL at 06:12

## 2023-11-29 RX ADMIN — CEFEPIME 2 G: 2 INJECTION, POWDER, FOR SOLUTION INTRAVENOUS at 20:10

## 2023-11-29 RX ADMIN — SODIUM CHLORIDE, POTASSIUM CHLORIDE, SODIUM LACTATE AND CALCIUM CHLORIDE 1000 ML: 600; 310; 30; 20 INJECTION, SOLUTION INTRAVENOUS at 20:37

## 2023-11-29 RX ADMIN — MORPHINE SULFATE 4 MG: 4 INJECTION, SOLUTION INTRAMUSCULAR; INTRAVENOUS at 04:41

## 2023-11-29 RX ADMIN — VANCOMYCIN HYDROCHLORIDE 2 G: 10 INJECTION, POWDER, LYOPHILIZED, FOR SOLUTION INTRAVENOUS at 22:34

## 2023-11-29 RX ADMIN — LIDOCAINE AND PRILOCAINE: 25; 25 CREAM TOPICAL at 04:46

## 2023-11-29 RX ADMIN — OXYCODONE AND ACETAMINOPHEN 1 TABLET: 5; 325 TABLET ORAL at 06:11

## 2023-11-29 RX ADMIN — ONDANSETRON 4 MG: 2 INJECTION INTRAMUSCULAR; INTRAVENOUS at 04:38

## 2023-11-29 RX ADMIN — MORPHINE SULFATE 4 MG: 4 INJECTION, SOLUTION INTRAMUSCULAR; INTRAVENOUS at 21:46

## 2023-11-29 RX ADMIN — SODIUM CHLORIDE 1000 ML: 9 INJECTION, SOLUTION INTRAVENOUS at 04:38

## 2023-11-29 RX ADMIN — METRONIDAZOLE 500 MG: 500 INJECTION, SOLUTION INTRAVENOUS at 20:37

## 2023-11-29 RX ADMIN — PIPERACILLIN AND TAZOBACTAM 3375 MG: 3; .375 INJECTION, POWDER, LYOPHILIZED, FOR SOLUTION INTRAVENOUS at 04:46

## 2023-11-29 RX ADMIN — SULFAMETHOXAZOLE AND TRIMETHOPRIM 1 TABLET: 800; 160 TABLET ORAL at 04:43

## 2023-11-29 ASSESSMENT — ENCOUNTER SYMPTOMS
SHORTNESS OF BREATH: 0
ROS SKIN COMMENTS: LEFT HAND
SORE THROAT: 0
DIARRHEA: 0
RHINORRHEA: 0
FEVER: 1
ABDOMINAL PAIN: 0
WOUND: 1
CHILLS: 1
BLOOD IN STOOL: 0
COLOR CHANGE: 1
VOMITING: 0
NAUSEA: 0
COUGH: 0

## 2023-11-29 ASSESSMENT — PAIN DESCRIPTION - PAIN TYPE: TYPE: ACUTE PAIN

## 2023-11-29 ASSESSMENT — LIFESTYLE VARIABLES
REASON UNABLE TO ASSESS: YES
EVER FELT BAD OR GUILTY ABOUT YOUR DRINKING: NO
HAVE PEOPLE ANNOYED YOU BY CRITICIZING YOUR DRINKING: NO
HAVE YOU EVER FELT YOU SHOULD CUT DOWN ON YOUR DRINKING: NO
EVER HAD A DRINK FIRST THING IN THE MORNING TO STEADY YOUR NERVES TO GET RID OF A HANGOVER: NO

## 2023-11-29 ASSESSMENT — PAIN DESCRIPTION - ORIENTATION: ORIENTATION: LEFT

## 2023-11-29 ASSESSMENT — PAIN SCALES - GENERAL
PAINLEVEL_OUTOF10: 8
PAINLEVEL_OUTOF10: 10
PAINLEVEL_OUTOF10: 10 - WORST POSSIBLE PAIN

## 2023-11-29 ASSESSMENT — PAIN - FUNCTIONAL ASSESSMENT
PAIN_FUNCTIONAL_ASSESSMENT: 0-10
PAIN_FUNCTIONAL_ASSESSMENT: 0-10

## 2023-11-29 ASSESSMENT — PAIN DESCRIPTION - LOCATION: LOCATION: HAND

## 2023-11-29 NOTE — ED NOTES
Blood cultures obtained from right forearm per policy. Set one of two drawn at this time.             Rudy Severino, RN  11/29/23 [address], [address]  11/29/23 5026

## 2023-11-29 NOTE — ED PROVIDER NOTES
advised them that they should return to the emergency department if they get worse, or not getting better or develop any new or concerning symptoms. Patient demonstrates understanding. CRITICAL CARE TIME   Total Critical Caretime was 0 minutes, excluding separately reportable procedures. There was a high probability of clinically significant/life threatening deterioration in the patient's condition which required my urgent intervention. Procedures    FINAL IMPRESSION      1. Abscess of skin with lymphangitis          DISPOSITION/PLAN   DISPOSITION Decision To Discharge 11/29/2023 05:34:06 AM      PATIENT REFERRED TO:  Meri Sims MD  [address]  [phone]            DISCHARGE MEDICATIONS:  New Prescriptions    CEPHALEXIN (KEFLEX) 500 MG CAPSULE    Take 2 capsules by mouth 2 times daily for 10 days    MUPIROCIN (BACTROBAN) 2 % OINTMENT    Apply topically 3 times daily. OXYCODONE-ACETAMINOPHEN (PERCOCET) 5-325 MG PER TABLET    Take 1 tablet by mouth every 6 hours as needed for Pain for up to 3 days. Intended supply: 3 days. Take lowest dose possible to manage pain Max Daily Amount: 4 tablets    SULFAMETHOXAZOLE-TRIMETHOPRIM (BACTRIM DS) 800-160 MG PER TABLET    Take 1 tablet by mouth 2 times daily for 10 days          (Please notethat portions of this note were completed with a voice recognition program.  Efforts were made to edit the dictations but occasionally words are mis-transcribed. )    EWA Treadwell (electronically signed)  Emergency Physician Assistant     Blood work unremarkable     Mando Tierney, [address] [place] [address] Road  11/29/23 [id number]

## 2023-11-29 NOTE — TELEPHONE ENCOUNTER
Pt called in with update  States his fingers are more swollen and painful. No color change  Feels the same per pt. Pt concerned for serious infection. Culture still pending. Pt states he may go to [facility] for another assessment if condition worsens.    States \"I wait a few more hours then go if I am no better\"   Requested he call with updates

## 2023-11-29 NOTE — ED NOTES
Discharge instructions reviewed with pt. Pt verbalized understanding with no questions or concerns. Resps even, non labored. Skin p/w/d. IV removed. No acute distress noted. Pt verbalized understanding to f/u with PCP and to take prescriptions to pharmacy of choice to get them filled. Pt is ambulatory - gait is steady. VSS  GCS 15  ABCs intact.      Nick Galdamez RN  11/29/23 [id number]

## 2023-11-29 NOTE — TELEPHONE ENCOUNTER
Pt notified office that he went ti ER last night/this AM    Concerns for staph infection. Thinks he got bite by spider. Was last week. Last night it got swollen, red and painful. Given a lot of medications. Pending BC and wound culture. Pt states they lined the red spots. Aware he needs to call or go back to ER if redness spreads, pain worsens, or drainage noted. Discussed importance of follow up. He will monitor his my chart for results as well. Requested he call right away if condition worsens.

## 2023-11-29 NOTE — ED TRIAGE NOTES
Pt to ED due to right hand infection. Pt states he is unsure of how he get a wound on his left hand but is now having redness and swelling.

## 2023-11-29 NOTE — ED NOTES
Blood cultures obtained from right AC per policy. Set two of two drawn at this time.             Aleta Duff RN  11/29/23 0715

## 2023-11-30 ENCOUNTER — ANESTHESIA EVENT (OUTPATIENT)
Dept: OPERATING ROOM | Facility: HOSPITAL | Age: 38
DRG: 603 | End: 2023-11-30
Payer: COMMERCIAL

## 2023-11-30 ENCOUNTER — ANESTHESIA (OUTPATIENT)
Dept: OPERATING ROOM | Facility: HOSPITAL | Age: 38
DRG: 603 | End: 2023-11-30
Payer: COMMERCIAL

## 2023-11-30 PROBLEM — L02.512 ABSCESS OF LEFT HAND: Status: ACTIVE | Noted: 2023-11-29

## 2023-11-30 LAB
ANION GAP SERPL CALC-SCNC: 9 MMOL/L (ref 10–20)
BACTERIA BLD CULT ORG #2: NORMAL
BACTERIA BLD CULT: NORMAL
BUN SERPL-MCNC: 13 MG/DL (ref 6–23)
CALCIUM SERPL-MCNC: 8.2 MG/DL (ref 8.6–10.3)
CHLORIDE SERPL-SCNC: 105 MMOL/L (ref 98–107)
CO2 SERPL-SCNC: 24 MMOL/L (ref 21–32)
CREAT SERPL-MCNC: 1.23 MG/DL (ref 0.5–1.3)
ERYTHROCYTE [DISTWIDTH] IN BLOOD BY AUTOMATED COUNT: 13.3 % (ref 11.5–14.5)
GFR SERPL CREATININE-BSD FRML MDRD: 77 ML/MIN/1.73M*2
GLUCOSE SERPL-MCNC: 127 MG/DL (ref 74–99)
HCT VFR BLD AUTO: 37.3 % (ref 41–52)
HGB BLD-MCNC: 12.5 G/DL (ref 13.5–17.5)
INR PPP: 1.3 (ref 0.9–1.1)
MCH RBC QN AUTO: 30.9 PG (ref 26–34)
MCHC RBC AUTO-ENTMCNC: 33.5 G/DL (ref 32–36)
MCV RBC AUTO: 92 FL (ref 80–100)
NRBC BLD-RTO: 0 /100 WBCS (ref 0–0)
PLATELET # BLD AUTO: 163 X10*3/UL (ref 150–450)
POTASSIUM SERPL-SCNC: 3.5 MMOL/L (ref 3.5–5.3)
PROTHROMBIN TIME: 14.7 SECONDS (ref 9.8–12.8)
RBC # BLD AUTO: 4.04 X10*6/UL (ref 4.5–5.9)
SODIUM SERPL-SCNC: 134 MMOL/L (ref 136–145)
WBC # BLD AUTO: 6.2 X10*3/UL (ref 4.4–11.3)

## 2023-11-30 PROCEDURE — 94640 AIRWAY INHALATION TREATMENT: CPT | Performed by: STUDENT IN AN ORGANIZED HEALTH CARE EDUCATION/TRAINING PROGRAM

## 2023-11-30 PROCEDURE — 3700000001 HC GENERAL ANESTHESIA TIME - INITIAL BASE CHARGE: Performed by: STUDENT IN AN ORGANIZED HEALTH CARE EDUCATION/TRAINING PROGRAM

## 2023-11-30 PROCEDURE — 87075 CULTR BACTERIA EXCEPT BLOOD: CPT | Mod: ELYLAB | Performed by: STUDENT IN AN ORGANIZED HEALTH CARE EDUCATION/TRAINING PROGRAM

## 2023-11-30 PROCEDURE — 2500000001 HC RX 250 WO HCPCS SELF ADMINISTERED DRUGS (ALT 637 FOR MEDICARE OP): Performed by: NURSE PRACTITIONER

## 2023-11-30 PROCEDURE — 2500000004 HC RX 250 GENERAL PHARMACY W/ HCPCS (ALT 636 FOR OP/ED): Performed by: STUDENT IN AN ORGANIZED HEALTH CARE EDUCATION/TRAINING PROGRAM

## 2023-11-30 PROCEDURE — 2500000004 HC RX 250 GENERAL PHARMACY W/ HCPCS (ALT 636 FOR OP/ED): Performed by: NURSE PRACTITIONER

## 2023-11-30 PROCEDURE — 36415 COLL VENOUS BLD VENIPUNCTURE: CPT | Performed by: NURSE PRACTITIONER

## 2023-11-30 PROCEDURE — 7100000001 HC RECOVERY ROOM TIME - INITIAL BASE CHARGE: Performed by: STUDENT IN AN ORGANIZED HEALTH CARE EDUCATION/TRAINING PROGRAM

## 2023-11-30 PROCEDURE — 2500000002 HC RX 250 W HCPCS SELF ADMINISTERED DRUGS (ALT 637 FOR MEDICARE OP, ALT 636 FOR OP/ED): Performed by: STUDENT IN AN ORGANIZED HEALTH CARE EDUCATION/TRAINING PROGRAM

## 2023-11-30 PROCEDURE — 2500000005 HC RX 250 GENERAL PHARMACY W/O HCPCS: Performed by: NURSE ANESTHETIST, CERTIFIED REGISTERED

## 2023-11-30 PROCEDURE — 7100000002 HC RECOVERY ROOM TIME - EACH INCREMENTAL 1 MINUTE: Performed by: STUDENT IN AN ORGANIZED HEALTH CARE EDUCATION/TRAINING PROGRAM

## 2023-11-30 PROCEDURE — 3600000007 HC OR TIME - EACH INCREMENTAL 1 MINUTE - PROCEDURE LEVEL TWO: Performed by: STUDENT IN AN ORGANIZED HEALTH CARE EDUCATION/TRAINING PROGRAM

## 2023-11-30 PROCEDURE — 3600000002 HC OR TIME - INITIAL BASE CHARGE - PROCEDURE LEVEL TWO: Performed by: STUDENT IN AN ORGANIZED HEALTH CARE EDUCATION/TRAINING PROGRAM

## 2023-11-30 PROCEDURE — 3700000002 HC GENERAL ANESTHESIA TIME - EACH INCREMENTAL 1 MINUTE: Performed by: STUDENT IN AN ORGANIZED HEALTH CARE EDUCATION/TRAINING PROGRAM

## 2023-11-30 PROCEDURE — 85027 COMPLETE CBC AUTOMATED: CPT | Performed by: NURSE PRACTITIONER

## 2023-11-30 PROCEDURE — 1100000001 HC PRIVATE ROOM DAILY

## 2023-11-30 PROCEDURE — 11042 DBRDMT SUBQ TIS 1ST 20SQCM/<: CPT | Performed by: STUDENT IN AN ORGANIZED HEALTH CARE EDUCATION/TRAINING PROGRAM

## 2023-11-30 PROCEDURE — 80048 BASIC METABOLIC PNL TOTAL CA: CPT | Performed by: NURSE PRACTITIONER

## 2023-11-30 PROCEDURE — 87186 SC STD MICRODIL/AGAR DIL: CPT | Mod: ELYLAB | Performed by: STUDENT IN AN ORGANIZED HEALTH CARE EDUCATION/TRAINING PROGRAM

## 2023-11-30 PROCEDURE — A4217 STERILE WATER/SALINE, 500 ML: HCPCS | Performed by: STUDENT IN AN ORGANIZED HEALTH CARE EDUCATION/TRAINING PROGRAM

## 2023-11-30 PROCEDURE — 2500000004 HC RX 250 GENERAL PHARMACY W/ HCPCS (ALT 636 FOR OP/ED): Performed by: NURSE ANESTHETIST, CERTIFIED REGISTERED

## 2023-11-30 PROCEDURE — 99223 1ST HOSP IP/OBS HIGH 75: CPT | Performed by: STUDENT IN AN ORGANIZED HEALTH CARE EDUCATION/TRAINING PROGRAM

## 2023-11-30 PROCEDURE — 87070 CULTURE OTHR SPECIMN AEROBIC: CPT | Mod: ELYLAB | Performed by: STUDENT IN AN ORGANIZED HEALTH CARE EDUCATION/TRAINING PROGRAM

## 2023-11-30 PROCEDURE — 2500000005 HC RX 250 GENERAL PHARMACY W/O HCPCS: Performed by: STUDENT IN AN ORGANIZED HEALTH CARE EDUCATION/TRAINING PROGRAM

## 2023-11-30 PROCEDURE — 2500000001 HC RX 250 WO HCPCS SELF ADMINISTERED DRUGS (ALT 637 FOR MEDICARE OP): Performed by: STUDENT IN AN ORGANIZED HEALTH CARE EDUCATION/TRAINING PROGRAM

## 2023-11-30 PROCEDURE — 85610 PROTHROMBIN TIME: CPT | Performed by: NURSE PRACTITIONER

## 2023-11-30 RX ORDER — SODIUM CHLORIDE 0.9 G/100ML
IRRIGANT IRRIGATION AS NEEDED
Status: DISCONTINUED | OUTPATIENT
Start: 2023-11-30 | End: 2023-11-30 | Stop reason: HOSPADM

## 2023-11-30 RX ORDER — DEXAMETHASONE SODIUM PHOSPHATE 100 MG/10ML
INJECTION INTRAMUSCULAR; INTRAVENOUS AS NEEDED
Status: DISCONTINUED | OUTPATIENT
Start: 2023-11-30 | End: 2023-11-30

## 2023-11-30 RX ORDER — BUPIVACAINE HYDROCHLORIDE 2.5 MG/ML
INJECTION, SOLUTION INFILTRATION; PERINEURAL AS NEEDED
Status: DISCONTINUED | OUTPATIENT
Start: 2023-11-30 | End: 2023-11-30 | Stop reason: HOSPADM

## 2023-11-30 RX ORDER — FENTANYL CITRATE 50 UG/ML
INJECTION, SOLUTION INTRAMUSCULAR; INTRAVENOUS AS NEEDED
Status: DISCONTINUED | OUTPATIENT
Start: 2023-11-30 | End: 2023-11-30

## 2023-11-30 RX ORDER — HYDRALAZINE HYDROCHLORIDE 20 MG/ML
5 INJECTION INTRAMUSCULAR; INTRAVENOUS EVERY 30 MIN PRN
Status: DISCONTINUED | OUTPATIENT
Start: 2023-11-30 | End: 2023-11-30 | Stop reason: HOSPADM

## 2023-11-30 RX ORDER — MIDAZOLAM HYDROCHLORIDE 1 MG/ML
INJECTION, SOLUTION INTRAMUSCULAR; INTRAVENOUS AS NEEDED
Status: DISCONTINUED | OUTPATIENT
Start: 2023-11-30 | End: 2023-11-30

## 2023-11-30 RX ORDER — ALBUTEROL SULFATE 0.83 MG/ML
2.5 SOLUTION RESPIRATORY (INHALATION) ONCE AS NEEDED
Status: COMPLETED | OUTPATIENT
Start: 2023-11-30 | End: 2023-11-30

## 2023-11-30 RX ORDER — MEROPENEM 1 G/1
1 INJECTION, POWDER, FOR SOLUTION INTRAVENOUS EVERY 8 HOURS
Status: DISCONTINUED | OUTPATIENT
Start: 2023-11-30 | End: 2023-12-02

## 2023-11-30 RX ORDER — ONDANSETRON HYDROCHLORIDE 2 MG/ML
4 INJECTION, SOLUTION INTRAVENOUS ONCE AS NEEDED
Status: DISCONTINUED | OUTPATIENT
Start: 2023-11-30 | End: 2023-11-30 | Stop reason: HOSPADM

## 2023-11-30 RX ORDER — FENTANYL CITRATE 50 UG/ML
25 INJECTION, SOLUTION INTRAMUSCULAR; INTRAVENOUS EVERY 5 MIN PRN
Status: DISCONTINUED | OUTPATIENT
Start: 2023-11-30 | End: 2023-11-30 | Stop reason: HOSPADM

## 2023-11-30 RX ORDER — SODIUM CHLORIDE, SODIUM LACTATE, POTASSIUM CHLORIDE, CALCIUM CHLORIDE 600; 310; 30; 20 MG/100ML; MG/100ML; MG/100ML; MG/100ML
100 INJECTION, SOLUTION INTRAVENOUS CONTINUOUS
Status: DISCONTINUED | OUTPATIENT
Start: 2023-11-30 | End: 2023-11-30 | Stop reason: HOSPADM

## 2023-11-30 RX ORDER — ESCITALOPRAM OXALATE 10 MG/1
10 TABLET ORAL DAILY
Status: DISCONTINUED | OUTPATIENT
Start: 2023-11-30 | End: 2023-12-03 | Stop reason: HOSPADM

## 2023-11-30 RX ORDER — PROPOFOL 10 MG/ML
INJECTION, EMULSION INTRAVENOUS AS NEEDED
Status: DISCONTINUED | OUTPATIENT
Start: 2023-11-30 | End: 2023-11-30

## 2023-11-30 RX ORDER — OXYCODONE HYDROCHLORIDE 5 MG/1
5 TABLET ORAL EVERY 4 HOURS PRN
Status: DISCONTINUED | OUTPATIENT
Start: 2023-11-30 | End: 2023-12-03 | Stop reason: HOSPADM

## 2023-11-30 RX ORDER — OXYCODONE AND ACETAMINOPHEN 5; 325 MG/1; MG/1
1 TABLET ORAL EVERY 6 HOURS PRN
Status: DISCONTINUED | OUTPATIENT
Start: 2023-11-30 | End: 2023-12-03 | Stop reason: HOSPADM

## 2023-11-30 RX ORDER — LIDOCAINE HYDROCHLORIDE 20 MG/ML
INJECTION, SOLUTION INFILTRATION; PERINEURAL AS NEEDED
Status: DISCONTINUED | OUTPATIENT
Start: 2023-11-30 | End: 2023-11-30

## 2023-11-30 RX ORDER — DIPHENHYDRAMINE HYDROCHLORIDE 50 MG/ML
12.5 INJECTION INTRAMUSCULAR; INTRAVENOUS ONCE AS NEEDED
Status: DISCONTINUED | OUTPATIENT
Start: 2023-11-30 | End: 2023-11-30 | Stop reason: HOSPADM

## 2023-11-30 RX ORDER — OXYCODONE AND ACETAMINOPHEN 5; 325 MG/1; MG/1
1 TABLET ORAL EVERY 4 HOURS PRN
Status: DISCONTINUED | OUTPATIENT
Start: 2023-11-30 | End: 2023-12-03 | Stop reason: HOSPADM

## 2023-11-30 RX ORDER — SODIUM CHLORIDE, SODIUM LACTATE, POTASSIUM CHLORIDE, CALCIUM CHLORIDE 600; 310; 30; 20 MG/100ML; MG/100ML; MG/100ML; MG/100ML
50 INJECTION, SOLUTION INTRAVENOUS CONTINUOUS
Status: DISCONTINUED | OUTPATIENT
Start: 2023-11-30 | End: 2023-12-03 | Stop reason: HOSPADM

## 2023-11-30 RX ORDER — OXYCODONE HYDROCHLORIDE 5 MG/1
10 TABLET ORAL EVERY 4 HOURS PRN
Status: DISCONTINUED | OUTPATIENT
Start: 2023-11-30 | End: 2023-12-03 | Stop reason: HOSPADM

## 2023-11-30 RX ADMIN — FENTANYL CITRATE 25 MCG: 50 INJECTION, SOLUTION INTRAMUSCULAR; INTRAVENOUS at 12:33

## 2023-11-30 RX ADMIN — LIDOCAINE HYDROCHLORIDE 60 MG: 20 INJECTION, SOLUTION INFILTRATION; PERINEURAL at 12:27

## 2023-11-30 RX ADMIN — HYDROMORPHONE HYDROCHLORIDE 0.5 MG: 1 INJECTION, SOLUTION INTRAMUSCULAR; INTRAVENOUS; SUBCUTANEOUS at 14:09

## 2023-11-30 RX ADMIN — OXYCODONE HYDROCHLORIDE 10 MG: 5 TABLET ORAL at 20:26

## 2023-11-30 RX ADMIN — OXYCODONE HYDROCHLORIDE AND ACETAMINOPHEN 1 TABLET: 5; 325 TABLET ORAL at 02:51

## 2023-11-30 RX ADMIN — ONDANSETRON 4 MG: 2 INJECTION INTRAMUSCULAR; INTRAVENOUS at 12:27

## 2023-11-30 RX ADMIN — ESCITALOPRAM OXALATE 10 MG: 10 TABLET, FILM COATED ORAL at 08:18

## 2023-11-30 RX ADMIN — PROPOFOL 160 MG: 10 INJECTION, EMULSION INTRAVENOUS at 12:27

## 2023-11-30 RX ADMIN — MEROPENEM 1 G: 1 INJECTION, POWDER, FOR SOLUTION INTRAVENOUS at 08:17

## 2023-11-30 RX ADMIN — DEXAMETHASONE SODIUM PHOSPHATE 8 MG: 10 INJECTION INTRAMUSCULAR; INTRAVENOUS at 12:34

## 2023-11-30 RX ADMIN — ALBUTEROL SULFATE 2.5 MG: 2.5 SOLUTION RESPIRATORY (INHALATION) at 13:40

## 2023-11-30 RX ADMIN — HYDROMORPHONE HYDROCHLORIDE 0.5 MG: 1 INJECTION, SOLUTION INTRAMUSCULAR; INTRAVENOUS; SUBCUTANEOUS at 14:03

## 2023-11-30 RX ADMIN — MEROPENEM 1 G: 1 INJECTION, POWDER, FOR SOLUTION INTRAVENOUS at 17:42

## 2023-11-30 RX ADMIN — FENTANYL CITRATE 50 MCG: 50 INJECTION, SOLUTION INTRAMUSCULAR; INTRAVENOUS at 12:37

## 2023-11-30 RX ADMIN — DOCUSATE SODIUM 100 MG: 100 CAPSULE, LIQUID FILLED ORAL at 20:26

## 2023-11-30 RX ADMIN — ACETAMINOPHEN 650 MG: 325 TABLET ORAL at 13:58

## 2023-11-30 RX ADMIN — PROPOFOL 40 MG: 10 INJECTION, EMULSION INTRAVENOUS at 12:38

## 2023-11-30 RX ADMIN — SODIUM CHLORIDE, POTASSIUM CHLORIDE, SODIUM LACTATE AND CALCIUM CHLORIDE 50 ML/HR: 600; 310; 30; 20 INJECTION, SOLUTION INTRAVENOUS at 11:46

## 2023-11-30 RX ADMIN — ABACAVIR SULFATE, DOLUTEGRAVIR SODIUM, LAMIVUDINE 1 TABLET: 600; 50; 300 TABLET, FILM COATED ORAL at 08:18

## 2023-11-30 RX ADMIN — HYDROMORPHONE HYDROCHLORIDE 0.5 MG: 1 INJECTION, SOLUTION INTRAMUSCULAR; INTRAVENOUS; SUBCUTANEOUS at 21:43

## 2023-11-30 RX ADMIN — Medication 3 MG: at 20:26

## 2023-11-30 RX ADMIN — FENTANYL CITRATE 25 MCG: 50 INJECTION, SOLUTION INTRAMUSCULAR; INTRAVENOUS at 12:35

## 2023-11-30 RX ADMIN — HYDROMORPHONE HYDROCHLORIDE 0.5 MG: 1 INJECTION, SOLUTION INTRAMUSCULAR; INTRAVENOUS; SUBCUTANEOUS at 01:11

## 2023-11-30 RX ADMIN — OXYCODONE HYDROCHLORIDE 10 MG: 5 TABLET ORAL at 13:58

## 2023-11-30 RX ADMIN — MIDAZOLAM 2 MG: 1 INJECTION INTRAMUSCULAR; INTRAVENOUS at 12:21

## 2023-11-30 SDOH — SOCIAL STABILITY: SOCIAL INSECURITY: DO YOU FEEL UNSAFE GOING BACK TO THE PLACE WHERE YOU ARE LIVING?: NO

## 2023-11-30 SDOH — SOCIAL STABILITY: SOCIAL INSECURITY: ARE THERE ANY APPARENT SIGNS OF INJURIES/BEHAVIORS THAT COULD BE RELATED TO ABUSE/NEGLECT?: NO

## 2023-11-30 SDOH — SOCIAL STABILITY: SOCIAL INSECURITY: HAS ANYONE EVER THREATENED TO HURT YOUR FAMILY OR YOUR PETS?: NO

## 2023-11-30 SDOH — HEALTH STABILITY: MENTAL HEALTH: CURRENT SMOKER: 1

## 2023-11-30 SDOH — SOCIAL STABILITY: SOCIAL INSECURITY: HAVE YOU HAD THOUGHTS OF HARMING ANYONE ELSE?: NO

## 2023-11-30 SDOH — SOCIAL STABILITY: SOCIAL INSECURITY: ARE YOU OR HAVE YOU BEEN THREATENED OR ABUSED PHYSICALLY, EMOTIONALLY, OR SEXUALLY BY ANYONE?: NO

## 2023-11-30 SDOH — SOCIAL STABILITY: SOCIAL INSECURITY: DOES ANYONE TRY TO KEEP YOU FROM HAVING/CONTACTING OTHER FRIENDS OR DOING THINGS OUTSIDE YOUR HOME?: NO

## 2023-11-30 SDOH — SOCIAL STABILITY: SOCIAL INSECURITY: DO YOU FEEL ANYONE HAS EXPLOITED OR TAKEN ADVANTAGE OF YOU FINANCIALLY OR OF YOUR PERSONAL PROPERTY?: NO

## 2023-11-30 SDOH — SOCIAL STABILITY: SOCIAL INSECURITY: WERE YOU ABLE TO COMPLETE ALL THE BEHAVIORAL HEALTH SCREENINGS?: YES

## 2023-11-30 SDOH — SOCIAL STABILITY: SOCIAL INSECURITY: ABUSE: ADULT

## 2023-11-30 ASSESSMENT — COGNITIVE AND FUNCTIONAL STATUS - GENERAL
MOBILITY SCORE: 23
CLIMB 3 TO 5 STEPS WITH RAILING: A LITTLE
DAILY ACTIVITIY SCORE: 24
DAILY ACTIVITIY SCORE: 24
PATIENT BASELINE BEDBOUND: NO
MOBILITY SCORE: 24
MOBILITY SCORE: 24

## 2023-11-30 ASSESSMENT — PAIN - FUNCTIONAL ASSESSMENT
PAIN_FUNCTIONAL_ASSESSMENT: 0-10

## 2023-11-30 ASSESSMENT — ENCOUNTER SYMPTOMS
ARTHRALGIAS: 0
CONSTITUTIONAL NEGATIVE: 1
ROS SKIN COMMENTS: LEFT HAND
HEMATOLOGIC/LYMPHATIC NEGATIVE: 1
SHORTNESS OF BREATH: 0
NECK PAIN: 0
ABDOMINAL PAIN: 0
JOINT SWELLING: 0
NEUROLOGICAL NEGATIVE: 1
EYES NEGATIVE: 1
PSYCHIATRIC NEGATIVE: 1
APNEA: 0
MYALGIAS: 0
RESPIRATORY NEGATIVE: 1
ENDOCRINE NEGATIVE: 1
BACK PAIN: 0
WOUND: 1
NECK STIFFNESS: 0

## 2023-11-30 ASSESSMENT — LIFESTYLE VARIABLES
HOW OFTEN DO YOU HAVE A DRINK CONTAINING ALCOHOL: NEVER
AUDIT-C TOTAL SCORE: 0
AUDIT-C TOTAL SCORE: 0
HOW MANY STANDARD DRINKS CONTAINING ALCOHOL DO YOU HAVE ON A TYPICAL DAY: PATIENT DOES NOT DRINK
SKIP TO QUESTIONS 9-10: 1
HOW OFTEN DO YOU HAVE 6 OR MORE DRINKS ON ONE OCCASION: NEVER

## 2023-11-30 ASSESSMENT — ACTIVITIES OF DAILY LIVING (ADL)
PATIENT'S MEMORY ADEQUATE TO SAFELY COMPLETE DAILY ACTIVITIES?: YES
GROOMING: NEEDS ASSISTANCE
LACK_OF_TRANSPORTATION: NO
DRESSING YOURSELF: NEEDS ASSISTANCE
HEARING - LEFT EAR: FUNCTIONAL
FEEDING YOURSELF: NEEDS ASSISTANCE
WALKS IN HOME: INDEPENDENT
BATHING: NEEDS ASSISTANCE
TOILETING: INDEPENDENT
JUDGMENT_ADEQUATE_SAFELY_COMPLETE_DAILY_ACTIVITIES: YES
HEARING - RIGHT EAR: FUNCTIONAL
ADEQUATE_TO_COMPLETE_ADL: YES

## 2023-11-30 ASSESSMENT — COLUMBIA-SUICIDE SEVERITY RATING SCALE - C-SSRS
1. IN THE PAST MONTH, HAVE YOU WISHED YOU WERE DEAD OR WISHED YOU COULD GO TO SLEEP AND NOT WAKE UP?: NO
2. HAVE YOU ACTUALLY HAD ANY THOUGHTS OF KILLING YOURSELF?: NO
6. HAVE YOU EVER DONE ANYTHING, STARTED TO DO ANYTHING, OR PREPARED TO DO ANYTHING TO END YOUR LIFE?: NO

## 2023-11-30 ASSESSMENT — PAIN SCALES - GENERAL
PAINLEVEL_OUTOF10: 4
PAINLEVEL_OUTOF10: 0 - NO PAIN
PAINLEVEL_OUTOF10: 8
PAIN_LEVEL: 1
PAINLEVEL_OUTOF10: 3
PAINLEVEL_OUTOF10: 7
PAINLEVEL_OUTOF10: 4
PAINLEVEL_OUTOF10: 5 - MODERATE PAIN
PAINLEVEL_OUTOF10: 3
PAINLEVEL_OUTOF10: 7
PAINLEVEL_OUTOF10: 0 - NO PAIN
PAINLEVEL_OUTOF10: 7

## 2023-11-30 ASSESSMENT — PAIN DESCRIPTION - DESCRIPTORS: DESCRIPTORS: ACHING;BURNING

## 2023-11-30 NOTE — CONSULTS
Infectious Disease    Patient Name: Mathieu Kelly  Date: 11/30/2023  YOB: 1985  Medical Record Number: [id number]        Chief Complaint   Patient presents with    Wound Infection         History of Present Illness:  Patient 39 YO male who presents with left hand pain and swelling. Possible arthropod involvement patient believes but did not see anything, while out of state in [place].He has seen at [facility] who did a wound culture and placed him on oral Bactrim and discharged him. Taken to surgery today , and underwent debridement of abscess      Review of Systems: All other ROS reviewed and are negative other than as stated in HPI            Social History     Tobacco Use    Smoking status: Every Day     Packs/day: .5     Types: Cigarettes    Smokeless tobacco: Never   Vaping Use    Vaping Use: Never used   Substance Use Topics    Alcohol use: Defer    Drug use: Defer         Past Medical History:   Diagnosis Date    ADHD (attention deficit hyperactivity disorder)     Anxiety     Asthma     HIV (human immunodeficiency virus infection) (CMS/[facility])            Past Surgical History:   Procedure Laterality Date    LUNG SURGERY  05/18/2017    Lung Surgery    OTHER SURGICAL HISTORY  05/18/2017    Oral Surgery Tooth Extraction [place] [facility]           Current Facility-Administered Medications:     abacavir-dolutegravir-lamivud (Triumeq) 600- mg per tablet 1 tablet, 1 tablet, oral, Daily, JAM Sanchez, 1 tablet at 11/30/23 0818    acetaminophen (Tylenol) tablet 650 mg, 650 mg, oral, q4h PRN, 650 mg at 11/30/23 1358 **OR** acetaminophen (Tylenol) oral liquid 650 mg, 650 mg, oral, q4h PRN **OR** acetaminophen (Tylenol) suppository 650 mg, 650 mg, rectal, q4h PRN, Antwan Chen APRN-CNP    docusate sodium (Colace) capsule 100 mg, 100 mg, oral, BID, JAM Sanchez    escitalopram (Lexapro) tablet 10 mg, 10 mg, oral, Daily, JAM Sanchez, 10 mg at  "11/30/23 0818    HYDROmorphone (Dilaudid) injection 0.5 mg, 0.5 mg, intravenous, q4h PRN, JAM Sanchez, 0.5 mg at 11/30/23 0111    lactated Ringer's infusion, 50 mL/hr, intravenous, Continuous, JAM Sanchez, Last Rate: 50 mL/hr at 11/30/23 1223, Continued by Anesthesia at 11/30/23 1223    melatonin tablet 3 mg, 3 mg, oral, Daily, JAM Sanchez    meropenem (Merrem) in sodium chloride 0.9 % 100 mL IV 1 g, 1 g, intravenous, q8h, JAM Sanchez, Stopped at 11/30/23 0847    ondansetron (Zofran) tablet 4 mg, 4 mg, oral, q8h PRN **OR** ondansetron (Zofran) injection 4 mg, 4 mg, intravenous, q8h PRN, CAROLA Sanchez-CNP, 4 mg at 11/30/23 1227    oxyCODONE (Roxicodone) immediate release tablet 10 mg, 10 mg, oral, q4h PRN, Bertin Flanagan DO, 10 mg at 11/30/23 1358    oxyCODONE (Roxicodone) immediate release tablet 5 mg, 5 mg, oral, q4h PRN, Bertin Flanagan DO    oxyCODONE (Roxicodone) immediate release tablet 5 mg, 5 mg, oral, q4h PRN, Bertin Flanagan DO    oxyCODONE-acetaminophen (Percocet) 5-325 mg per tablet 1 tablet, 1 tablet, oral, q6h PRN, CAROLA Sanchez-CNP, 1 tablet at 11/30/23 0251    oxyCODONE-acetaminophen (Percocet) 5-325 mg per tablet 1 tablet, 1 tablet, oral, q4h PRN, CAROLA Sanchez-CNP    promethazine (Phenergan) 6.25 mg in sodium chloride 0.9% 50 mL IV, 6.25 mg, intravenous, Once PRN, Bertin Flanagan, DO     Allergies   Allergen Reactions    Tramadol Nausea/vomiting     Does not remember what the reaction was to tramadol    Codeine Unknown and Itching    Ketorolac Unknown, Dizziness, Nausea Only and Rash    Vancomycin Rash     Red man syndrome          Family History   Problem Relation Name Age of Onset    Hypertension Mother      Hypertension Father           Physical Exam:    Blood pressure 118/60, pulse 77, temperature 36.7 °C (98.1 °F), resp. rate 18, height 1.854 m (6' 1\"), weight 82.1 kg (181 lb), SpO2 96 %.  General: " Patient appears ok at the present time. NAD  Skin: no new rashes  HEENT:  Neck is supple, No subconjunctival hemorrhages, no oral exudates  Heart: S1 S2  Lungs: clear bilaterally  Abdomen: soft, ND, NTTP,  Back :no CVA tenderness  Extrem: hand dressed  Neuro exam: CN II-XII intact  Psych: cooperative    Labs:  I have reviewed all lab results by electronic record, including most recent CBC, metabolic panel, and pertinent abnormalities were addressed from an infectious disease perspective.  Trends are being monitored over time.    Lab Results   Component Value Date    WBC 6.2 11/30/2023    HGB 12.5 (L) 11/30/2023    HCT 37.3 (L) 11/30/2023    MCV 92 11/30/2023     11/30/2023     Lab Results   Component Value Date    GLUCOSE 127 (H) 11/30/2023    CALCIUM 8.2 (L) 11/30/2023     (L) 11/30/2023    K 3.5 11/30/2023    CO2 24 11/30/2023     11/30/2023    BUN 13 11/30/2023    CREATININE 1.23 11/30/2023       Radiology:  I have reviewed imaging results per electronic record and most pertinent abnormalities are being addressed from an infectious disease standpoint.            ASSESSMENT:  Problem List Items Addressed This Visit          Advance Directives and General Issues    * (Principal) Failure of outpatient treatment - Primary       Infectious Diseases    Abscess of left hand    Relevant Orders    Case Request Operating Room: Debridement Hand (Completed)    Tissue/Wound Culture/Smear     Other Visit Diagnoses       Cellulitis of left arm        Fever in adult             HIV      PLAN:   Given vancomycin and meropenem, continue for now, await cultures  Continue current  HIV regimen

## 2023-11-30 NOTE — TELEPHONE ENCOUNTER
Pt went to [facility] ER. For worsening condition. Had fever and chills. Increased redness and swelling. Admitted at this time. Per pt IND needed today. States he had a reaction to vacno  ID is consulted. Will follow up with pt as needed. He will also call with updates.

## 2023-11-30 NOTE — PROGRESS NOTES
"Daily Progress Note    Mathieu Kelly is a 38 y.o. male on day 1 of admission presenting with Failure of outpatient treatment.    Subjective   Patient resting comfortably in bed seen after surgery.  Patient denies any complaints at this time.  Left hand fingers warm with good cap refill.         Objective     Physical Exam  Constitutional:       Appearance: Normal appearance.   HENT:      Head: Normocephalic.      Mouth/Throat:      Mouth: Mucous membranes are moist.   Eyes:      Pupils: Pupils are equal, round, and reactive to light.   Cardiovascular:      Rate and Rhythm: Normal rate and regular rhythm.      Heart sounds: Normal heart sounds, S1 normal and S2 normal.   Pulmonary:      Effort: Pulmonary effort is normal.      Breath sounds: Normal breath sounds.   Abdominal:      General: Bowel sounds are normal.      Palpations: Abdomen is soft.   Musculoskeletal:      Right hand: Normal.      Left hand: Swelling and tenderness present. Decreased range of motion.      Cervical back: Neck supple.      Comments: Postop dressing CDI   Skin:     General: Skin is warm.   Neurological:      Mental Status: He is alert and oriented to person, place, and time.   Psychiatric:         Mood and Affect: Mood normal.         Behavior: Behavior normal.         Last Recorded Vitals  Blood pressure 133/73, pulse 86, temperature 36.7 °C (98.1 °F), temperature source Temporal, resp. rate 17, height 1.854 m (6' 1\"), weight 82.1 kg (181 lb), SpO2 96 %.  Intake/Output last 3 Shifts:  No intake/output data recorded.    Medications  Scheduled medications  abacavir-dolutegravir-lamivud, 1 tablet, oral, Daily  docusate sodium, 100 mg, oral, BID  escitalopram, 10 mg, oral, Daily  melatonin, 3 mg, oral, Daily  meropenem, 1 g, intravenous, q8h      Continuous medications  lactated Ringer's, 50 mL/hr, Last Rate: 50 mL/hr (11/30/23 1223)      PRN medications  PRN medications: acetaminophen **OR** acetaminophen **OR** acetaminophen, " HYDROmorphone, ondansetron **OR** ondansetron, oxyCODONE, oxyCODONE, oxyCODONE, oxyCODONE-acetaminophen, oxyCODONE-acetaminophen, promethazine (Phenergan) 6.25 mg in sodium chloride 0.9% 50 mL IV    Labs  CBC:   Results from last 7 days   Lab Units 11/30/23 0549 11/29/23 2020   WBC AUTO x10*3/uL 6.2 9.9   RBC AUTO x10*6/uL 4.04* 4.20*   HEMOGLOBIN g/dL 12.5* 13.0*   HEMATOCRIT % 37.3* 37.7*   MCV fL 92 90   MCH pg 30.9 31.0   MCHC g/dL 33.5 34.5   RDW % 13.3 13.2   PLATELETS AUTO x10*3/uL 163 186     CMP:    Results from last 7 days   Lab Units 11/30/23  0550 11/29/23 2020   SODIUM mmol/L 134* 134*   POTASSIUM mmol/L 3.5 3.6   CHLORIDE mmol/L 105 103   CO2 mmol/L 24 23   BUN mg/dL 13 15   CREATININE mg/dL 1.23 1.27   GLUCOSE mg/dL 127* 112*   PROTEIN TOTAL g/dL  --  6.8   CALCIUM mg/dL 8.2* 8.9   BILIRUBIN TOTAL mg/dL  --  0.9   ALK PHOS U/L  --  46   AST U/L  --  22   ALT U/L  --  18     BMP:    Results from last 7 days   Lab Units 11/30/23 0550 11/29/23 2020   SODIUM mmol/L 134* 134*   POTASSIUM mmol/L 3.5 3.6   CHLORIDE mmol/L 105 103   CO2 mmol/L 24 23   BUN mg/dL 13 15   CREATININE mg/dL 1.23 1.27   CALCIUM mg/dL 8.2* 8.9   GLUCOSE mg/dL 127* 112*     Magnesium:    Troponin:      BNP:     Lipid Panel:  Results from last 7 days   Lab Units 11/30/23  1206   INR  1.3*   PROTIME seconds 14.7*          Assessment/Plan    Abscess of left hand/failure of outpatient treatment  -X-ray negative for gaseous process or osteomyelitis  -Patient taken to the OR today for an I&D by Ortho  -ID consulted  -Wound culture pending  -Continue IV antibiotics  -Daily CBC and BMP    HIV/asthma  -Continue home ARV T medication  -Not in acute exacerbation  -As needed bronchodilators    DVTp: Not indicated    PLAN: Home    Dennise Love APRN-CNP         I spent 30 minutes in the professional and overall care of this patient.      Plan of care was discussed extensively with patient.  Patient verbalized understanding through teach  back method.  All question and concerns addressed upon examination.    Of note, this documentation is completed using the Dragon Dictation system (voice recognition software). There may be spelling and/or grammatical errors that were not corrected prior to final submission.

## 2023-11-30 NOTE — H&P
History Of Present Illness  Mathieu Kelly is a 38 y.o. male with past medical history significant for ADHD, anxiety, asthma and HIV currently on ARVT who presents to [facility] emergency department chief complaint of left hand pain and swelling.  Patient believes he was bit by a spider about a week ago while he was visiting in [place].  The wound has been enlarging with increasing redness and pain.  He had not been seen until this morning at [facility] who did a wound culture and placed him on oral Bactrim and discharged him.  The wound currently draining purulent fluid.  He endorses fevers and chills.  He was taking Tylenol at home for pain.  Patient denies any history of diabetes or IV drug use.  Denies any history of blood clots, shortness of breath, chest pain, palpitations, nausea, vomiting or diarrhea.    Upon presentation emergency department afebrile 37.7,  sinus tach, RR 18, /68, 98% on room air.  Chemistry within normal limits.  LFTs normal.  CRP 7.79.  Lactate 0.8.  WBC 9.9.  Hemoglobin 13.0, hematocrit 37.7, platelet count 186.  Sed rate 16.  Wound cultures pending.  X-ray of the hand Shows moderate diffuse soft tissue swelling of the left hand worst dorsally overlying the metatarsals.  No evidence for soft tissue gas or radiopaque foreign body.  No osseous erosions to suggest osteomyelitis.  Subtle avulsion fracture involving the base of the proximal phalanx middle finger.  She was treated with cefepime, Flagyl and vancomycin in the ED and admitted to internal medicine for further evaluation treatment.    Past Medical History  Past Medical History:   Diagnosis Date    ADHD (attention deficit hyperactivity disorder)     Anxiety     Asthma     HIV (human immunodeficiency virus infection) (CMS/[facility])      Surgical History  He has a past surgical history that includes Lung surgery (05/18/2017) and Other surgical history (05/18/2017).     Social History  He reports that he has been smoking  cigarettes. He has been smoking an average of .5 packs per day. He has never used smokeless tobacco. Alcohol use questions deferred to the physician. Drug use questions deferred to the physician.    Family History  Family History   Problem Relation Name Age of Onset    Hypertension Mother      Hypertension Father          Allergies  Penicillins, Tramadol, Codeine, and Ketorolac    Medications    Current Facility-Administered Medications:     cefepime (Maxipime) 2 g in dextrose 5 % 100 mL IV, 2 g, intravenous, Once, Niranjan Wheeler PA-C    vancomycin (Vancocin) 2 g in dextrose 5 % in water (D5W) 500 mL IV, 2 g, intravenous, Once, Nirnajan Wheeler PA-C    Current Outpatient Medications:     abacavir-dolutegravir-lamivud (Triumeq) 600- mg tablet, Take 1 tablet by mouth once daily., Disp: , Rfl:     albuterol (ProAir HFA) 90 mcg/actuation inhaler, Inhale., Disp: , Rfl:     albuterol 2.5 mg /3 mL (0.083 %) nebulizer solution, Inhale., Disp: , Rfl:     Antiseptic Skin Clnsr,chlorhe, 4 % external liquid, APPLY TO AFFECTED AREA EVERY DAY AS NEEDED, Disp: , Rfl:     cephalexin (Keflex) 500 mg capsule, Take 1 capsule (500 mg) by mouth 3 times a day., Disp: , Rfl:     chlorhexidine (Peridex) 0.12 % solution, TAKE 15 ML BY MOUTH 2 TIMES DAILY FOR 7 DAYS. SWISH AND SPIT DO NOT SWALLOW, Disp: , Rfl:     cholecalciferol (Vitamin D-3) 25 MCG (1000 UT) tablet, Take 1 tablet (1,000 Units) by mouth once daily., Disp: , Rfl:     clindamycin (Cleocin) 150 mg capsule, TAKE 3 CAPSULES BY MOUTH 3 TIMES A DAY FOR 5 DAYS, Disp: , Rfl:     escitalopram (Lexapro) 10 mg tablet, Take 1 tablet (10 mg) by mouth once daily., Disp: , Rfl:     folic acid-vit B6-vit B12 (Folbee) 2.5-25-1 mg tablet tablet, Take 1 tablet by mouth once daily., Disp: , Rfl:     HYDROcodone-acetaminophen (Norco) 5-325 mg tablet, Take 1 tablet by mouth every 6 hours if needed., Disp: , Rfl:     ibuprofen 800 mg tablet, Take by mouth., Disp: , Rfl:     lisdexamfetamine  (Vyvanse) 70 mg capsule, Take 1 capsule (70 mg) by mouth once daily in the morning. Do not start before November 15, 2023., Disp: 30 capsule, Rfl: 0    [START ON 12/15/2023] lisdexamfetamine (Vyvanse) 70 mg capsule, Take 1 capsule (70 mg) by mouth once daily. Do not start before December 15, 2023., Disp: 30 capsule, Rfl: 0    [START ON 1/15/2024] lisdexamfetamine (Vyvanse) 70 mg capsule, Take 1 capsule (70 mg) by mouth once daily in the morning. Do not start before January 15, 2024., Disp: 30 capsule, Rfl: 0    meclizine (Antivert) 25 mg tablet, Take by mouth., Disp: , Rfl:     mupirocin (Bactroban) 2 % ointment, APPLY TO AFFECTED AREA 3 TIMES A DAY, Disp: , Rfl:     sildenafil (Viagra) 50 mg tablet, TAKE 0.5 TABLETS BY MOUTH AS NEEDED FOR ERECTILE DYSFUNCTION., Disp: , Rfl:     SUMAtriptan (Imitrex) 100 mg tablet, Take 1 tablet (100 mg) by mouth 1 time if needed for migraine., Disp: 9 tablet, Rfl: 3    tiotropium-olodateroL (Stiolto Respimat) 2.5-2.5 mcg/actuation mist inhaler, Inhale., Disp: , Rfl:     Review of Systems   Constitutional:  Positive for chills and fever.   Skin:  Positive for wound.        Left hand    All other systems reviewed and are negative.       Physical Exam  Constitutional:       General: He is not in acute distress.     Appearance: Normal appearance. He is not toxic-appearing.   HENT:      Head: Normocephalic and atraumatic.      Nose: Nose normal.      Mouth/Throat:      Mouth: Mucous membranes are dry.      Pharynx: Oropharynx is clear.   Eyes:      Extraocular Movements: Extraocular movements intact.      Pupils: Pupils are equal, round, and reactive to light.   Cardiovascular:      Rate and Rhythm: Normal rate and regular rhythm.      Pulses: Normal pulses.      Heart sounds: Normal heart sounds.   Pulmonary:      Effort: Pulmonary effort is normal.      Breath sounds: Normal breath sounds.   Abdominal:      General: Abdomen is flat. Bowel sounds are normal.      Palpations: Abdomen is  soft.   Musculoskeletal:         General: Normal range of motion.      Cervical back: Normal range of motion and neck supple.   Skin:     General: Skin is warm and dry.      Capillary Refill: Capillary refill takes less than 2 seconds.      Findings: Abscess, erythema and wound present.          Neurological:      General: No focal deficit present.      Mental Status: He is alert and oriented to person, place, and time. Mental status is at baseline.   Psychiatric:         Attention and Perception: Attention and perception normal.         Mood and Affect: Mood normal.         Speech: Speech normal.         Behavior: Behavior normal. Behavior is cooperative.         Thought Content: Thought content normal.         Cognition and Memory: Cognition and memory normal.       Last Recorded Vitals  /68   Pulse (!) 128   Temp 37.7 °C (99.9 °F) (Temporal)   Resp 18   Wt 82.1 kg (181 lb)   SpO2 98%     Relevant Results  Results for orders placed or performed during the hospital encounter of 11/29/23 (from the past 24 hour(s))   CBC and Auto Differential   Result Value Ref Range    WBC 9.9 4.4 - 11.3 x10*3/uL    nRBC 0.0 0.0 - 0.0 /100 WBCs    RBC 4.20 (L) 4.50 - 5.90 x10*6/uL    Hemoglobin 13.0 (L) 13.5 - 17.5 g/dL    Hematocrit 37.7 (L) 41.0 - 52.0 %    MCV 90 80 - 100 fL    MCH 31.0 26.0 - 34.0 pg    MCHC 34.5 32.0 - 36.0 g/dL    RDW 13.2 11.5 - 14.5 %    Platelets 186 150 - 450 x10*3/uL    Neutrophils % 69.9 40.0 - 80.0 %    Immature Granulocytes %, Automated 0.4 0.0 - 0.9 %    Lymphocytes % 21.1 13.0 - 44.0 %    Monocytes % 8.2 2.0 - 10.0 %    Eosinophils % 0.1 0.0 - 6.0 %    Basophils % 0.3 0.0 - 2.0 %    Neutrophils Absolute 6.94 1.20 - 7.70 x10*3/uL    Immature Granulocytes Absolute, Automated 0.04 0.00 - 0.70 x10*3/uL    Lymphocytes Absolute 2.10 1.20 - 4.80 x10*3/uL    Monocytes Absolute 0.81 0.10 - 1.00 x10*3/uL    Eosinophils Absolute 0.01 0.00 - 0.70 x10*3/uL    Basophils Absolute 0.03 0.00 - 0.10  x10*3/uL   Comprehensive metabolic panel   Result Value Ref Range    Glucose 112 (H) 74 - 99 mg/dL    Sodium 134 (L) 136 - 145 mmol/L    Potassium 3.6 3.5 - 5.3 mmol/L    Chloride 103 98 - 107 mmol/L    Bicarbonate 23 21 - 32 mmol/L    Anion Gap 12 10 - 20 mmol/L    Urea Nitrogen 15 6 - 23 mg/dL    Creatinine 1.27 0.50 - 1.30 mg/dL    eGFR 74 >60 mL/min/1.73m*2    Calcium 8.9 8.6 - 10.3 mg/dL    Albumin 4.0 3.4 - 5.0 g/dL    Alkaline Phosphatase 46 33 - 120 U/L    Total Protein 6.8 6.4 - 8.2 g/dL    AST 22 9 - 39 U/L    Bilirubin, Total 0.9 0.0 - 1.2 mg/dL    ALT 18 10 - 52 U/L   C-Reactive Protein   Result Value Ref Range    C-Reactive Protein 9.79 (H) <1.00 mg/dL   Sedimentation Rate   Result Value Ref Range    Sedimentation Rate 16 (H) 0 - 15 mm/h   Lactate   Result Value Ref Range    Lactate 0.8 0.4 - 2.0 mmol/L     XR hand left 3+ views    Result Date: 11/29/2023  Interpreted By:  Yennifer Aguirre, STUDY: XR HAND LEFT 3+ VIEWS; ;  11/29/2023 8:44 pm   INDICATION: Signs/Symptoms:wound.   COMPARISON: None.   ACCESSION NUMBER(S): [id number]   ORDERING CLINICIAN: LING GALEANO   FINDINGS: Soft tissue swelling and probable ulceration overlying the dorsal aspect of the metatarsals. No radiopaque foreign body is seen. No soft tissue gas. No osseous erosions. Ossific density adjacent to the base of the proximal phalanx, middle finger, laterally suggestive of an avulsion fracture of uncertain age.       1. Moderate diffuse soft tissue swelling of the left hand worse dorsally overlying the metatarsals. No evidence for soft tissue gas or radiopaque foreign body is seen radiographically. No osseous erosions to suggest osteomyelitis.   2. Subtle avulsion fracture involving the base of the proximal phalanx, middle finger as above. Correlation with prior history of traumatic injury recommended.     MACRO: None   Signed by: Yennifer Aguirre 11/29/2023 9:15 PM Dictation workstation:   [id number]      Assessment/Plan   Principal  Problem:    Failure of outpatient treatment    #Cellulitis and abscess of the left hand  #Failure of outpatient treatment  Xray neg for gaseous process or osteomyelitis   No leukocytosis, +subjective fevers   Wound culture pending  Consult ID appreciated  Consult Orthopedics per ED  Cover with meropenem for now   Wound care consult   Medicate for pain as needed     #HIV  Continue home ARVT medication     #Asthma  Not in acute exacerbation  PRN bronchodilators     #DVTp  Not indicated     Angeline Baltazar, APRN-CNP

## 2023-11-30 NOTE — ANESTHESIA PREPROCEDURE EVALUATION
Mathieu Kelly is a 38 y.o. male here for:    Debridement Hand  With Shannon Upton MD  Pre-Op Diagnosis Codes:     * Abscess [L02.512]    Relevant Problems   Neuro/Psych   (+) Anxiety      Pulmonary   (+) Asthma   (+) COPD (chronic obstructive pulmonary disease) (CMS/[facility])      Hematology   (+) Anemia      Infectious Disease   (+) HIV-1 (human immunodeficiency virus I) (CMS/[facility])       Lab Results   Component Value Date    HGB 12.5 (L) 11/30/2023    HCT 37.3 (L) 11/30/2023    WBC 6.2 11/30/2023     11/30/2023     (L) 11/30/2023    K 3.5 11/30/2023     11/30/2023    CREATININE 1.23 11/30/2023    BUN 13 11/30/2023       Social History     Tobacco Use   Smoking Status Every Day    Packs/day: .5    Types: Cigarettes   Smokeless Tobacco Never       Allergies   Allergen Reactions    Tramadol Nausea/vomiting     Does not remember what the reaction was to tramadol    Codeine Unknown and Itching    Ketorolac Unknown, Dizziness, Nausea Only and Rash    Vancomycin Rash     Red man syndrome       Current Outpatient Medications   Medication Instructions    abacavir-dolutegravir-lamivud (Triumeq) 600- mg tablet 1 tablet, oral, Daily    Antiseptic Skin Clnsr,chlorhe, 4 % external liquid APPLY TO AFFECTED AREA EVERY DAY AS NEEDED    escitalopram (LEXAPRO) 10 mg, oral, Daily    HYDROcodone-acetaminophen (Norco) 5-325 mg tablet 1 tablet, oral, Every 6 hours PRN    ibuprofen 800 mg tablet oral    lisdexamfetamine (VYVANSE) 70 mg, oral, Every morning    [START ON 12/15/2023] lisdexamfetamine (VYVANSE) 70 mg, oral, Daily RT    [START ON 1/15/2024] lisdexamfetamine (VYVANSE) 70 mg, oral, Every morning    mupirocin (Bactroban) 2 % ointment APPLY TO AFFECTED AREA 3 TIMES A DAY    SUMAtriptan (IMITREX) 100 mg, oral, Once as needed       Past Surgical History:   Procedure Laterality Date    LUNG SURGERY  05/18/2017    Lung Surgery    OTHER SURGICAL HISTORY  05/18/2017    Oral Surgery Tooth Extraction  [place] [facility]       Family History   Problem Relation Name Age of Onset    Hypertension Mother      Hypertension Father         NPO Details:  No data recorded    Physical Exam    Airway  Mallampati: II  TM distance: >3 FB  Neck ROM: full     Cardiovascular   Rhythm: regular  Rate: normal     Dental   (+) upper dentures     Pulmonary   Breath sounds clear to auscultation     Abdominal            Anesthesia Plan    ASA 2 - emergent     general   (LMA 4)  The patient is a current smoker.    intravenous induction   Anesthetic plan and risks discussed with patient.    Plan discussed with CRNA.

## 2023-11-30 NOTE — NURSING NOTE
Patient returned from recovery in stable condition.  Left hand has ABD, kerlex and ace wrap. IS encouraged. Patient getting to 3000

## 2023-11-30 NOTE — ANESTHESIA PROCEDURE NOTES
Airway  Date/Time: 11/30/2023 12:30 PM  Urgency: elective      Staffing  Performed: CRNA   Authorized by: Bertin Flanagan DO    Performed by: CAROLA Deluna-CRNA  Patient location during procedure: OR    Indications and Patient Condition  Indications for airway management: anesthesia  Spontaneous ventilation: present  Sedation level: deep  Preoxygenated: yes  Patient position: sniffing  MILS maintained throughout  Mask difficulty assessment: 0 - not attempted  Planned trial extubation    Final Airway Details  Final airway type: supraglottic airway      Successful airway: classic  Size 4     Number of attempts at approach: 1  Number of other approaches attempted: 0

## 2023-11-30 NOTE — CONSULTS
Wound noted top of left hand with small amount of drainage seen. Wound is fluid filled and is 0.5cm x 0.5cm x 0.1cm. Surrounding tissue is red and sore. Wound culture obtained. Mepilex dressing applied  Wound Care Consult     Visit Date: 11/30/2023      Patient Name: Mathieu Kelly         MRN: [id number]           YOB: 1985     Reason for Consult: Wound Left Hand        Wound History: 1-2 days     Pertinent Labs:   Albumin   Date Value Ref Range Status   11/29/2023 4.0 3.4 - 5.0 g/dL Final       Wound Assessment:       Wound Team Summary Assessment: Wound noted top of hand with small amount of drainage seen.     Wound Team Plan:       Flynn Castillo LPN  11/30/2023  7:59 AM

## 2023-11-30 NOTE — CONSULTS
Inpatient consult to Orthopaedic Surgery  Consult performed by: CAROLA Sanchez-CNP  Consult ordered by: Dash Rob MD  Reason for consult: left hand abscess          Orthopedics Consult Note  Patient: Mathieu Kelly  Unit/Bed: 618/618-A  YOB: 1985  MRN: [id number]  Acct: [id number]   Admitting Diagnosis: Abscess of left hand [L02.512]  Cellulitis of left arm [L03.114]  Fever in adult [R50.9]  Failure of outpatient treatment [Z78.9]  Date:  11/29/2023  Hospital Day: 1  Attending: Tati Mcmullen MD         Complaint:  Chief Complaint   Patient presents with    Wound Infection        History of Present Illness:  38-year-old male came into the emergency department with an abscess to his left hand he states that he is visiting from [place] he thinks that he got bit by a spider on his left part of his hand approximately a week ago.  He does have an abscess on top of the hand that is very red swollen and tender to touch.  He states that a year or 2 ago that he had the same thing on his right leg and they had a going to do an irrigation and debridement.  He denies any IV drug use.  He states that he had a low-grade fever and chills the last couple days.  Denies chest pain, nausea, vomiting, PND, orthopnea, claudication  Allergies:  Allergies   Allergen Reactions    Tramadol Nausea/vomiting     Does not remember what the reaction was to tramadol    Codeine Unknown and Itching    Ketorolac Unknown, Dizziness, Nausea Only and Rash    Vancomycin Rash     Red man syndrome        PMHx:  Past Medical History:   Diagnosis Date    ADHD (attention deficit hyperactivity disorder)     Anxiety     Asthma     HIV (human immunodeficiency virus infection) (CMS/[facility])        PSHx:  Past Surgical History:   Procedure Laterality Date    LUNG SURGERY  05/18/2017    Lung Surgery    OTHER SURGICAL HISTORY  05/18/2017    Oral Surgery Tooth Extraction [place] [facility]       Social Hx:  Social History      Socioeconomic History    Marital status: Single     Spouse name: Not on file    Number of children: Not on file    Years of education: Not on file    Highest education level: Not on file   Occupational History    Not on file   Tobacco Use    Smoking status: Every Day     Packs/day: .5     Types: Cigarettes    Smokeless tobacco: Never   Vaping Use    Vaping Use: Never used   Substance and Sexual Activity    Alcohol use: Defer    Drug use: Defer    Sexual activity: Defer   Other Topics Concern    Not on file   Social History Narrative    Not on file     Social Determinants of Health     Financial Resource Strain: Not on file   Food Insecurity: Not on file   Transportation Needs: Not on file   Physical Activity: Not on file   Stress: Not on file   Social Connections: Not on file   Intimate Partner Violence: Not on file   Housing Stability: Not on file       Family Hx:  Family History   Problem Relation Name Age of Onset    Hypertension Mother      Hypertension Father         Review of Systems:   Review of Systems   Constitutional: Negative.    HENT: Negative.  Negative for congestion.    Eyes: Negative.    Respiratory: Negative.  Negative for apnea and shortness of breath.    Cardiovascular:  Negative for chest pain.   Gastrointestinal:  Negative for abdominal pain.   Endocrine: Negative.    Genitourinary: Negative.    Musculoskeletal:  Negative for arthralgias, back pain, gait problem, joint swelling, myalgias, neck pain and neck stiffness.   Skin:  Positive for wound.        Left hand   Neurological: Negative.    Hematological: Negative.    Psychiatric/Behavioral: Negative.           Physical Examination:    Visit Vitals  /82 (Patient Position: Sitting)   Pulse 93   Temp 37.5 °C (99.5 °F)   Resp 18      Physical Exam  Constitutional:       Appearance: Normal appearance.   Cardiovascular:      Rate and Rhythm: Normal rate.   Pulmonary:      Effort: Pulmonary effort is normal.   Skin:     General: Skin is  warm and dry.      Capillary Refill: Capillary refill takes less than 2 seconds.   Neurological:      Mental Status: He is alert.   Psychiatric:         Attention and Perception: Attention normal.         Behavior: Behavior is cooperative.         LABS:  LABS:  CBC:   Results from last 7 days   Lab Units 11/30/23 0549 11/29/23 2020   WBC AUTO x10*3/uL 6.2 9.9   RBC AUTO x10*6/uL 4.04* 4.20*   HEMOGLOBIN g/dL 12.5* 13.0*   HEMATOCRIT % 37.3* 37.7*   MCV fL 92 90   MCH pg 30.9 31.0   MCHC g/dL 33.5 34.5   RDW % 13.3 13.2   PLATELETS AUTO x10*3/uL 163 186     CMP:    Results from last 7 days   Lab Units 11/30/23 0550 11/29/23 2020   SODIUM mmol/L 134* 134*   POTASSIUM mmol/L 3.5 3.6   CHLORIDE mmol/L 105 103   CO2 mmol/L 24 23   BUN mg/dL 13 15   CREATININE mg/dL 1.23 1.27   GLUCOSE mg/dL 127* 112*   PROTEIN TOTAL g/dL  --  6.8   CALCIUM mg/dL 8.2* 8.9   BILIRUBIN TOTAL mg/dL  --  0.9   ALK PHOS U/L  --  46   AST U/L  --  22   ALT U/L  --  18       BMP:    Results from last 7 days   Lab Units 11/30/23 0550 11/29/23 2020   SODIUM mmol/L 134* 134*   POTASSIUM mmol/L 3.5 3.6   CHLORIDE mmol/L 105 103   CO2 mmol/L 24 23   BUN mg/dL 13 15   CREATININE mg/dL 1.23 1.27   CALCIUM mg/dL 8.2* 8.9   GLUCOSE mg/dL 127* 112*     Magnesium:    Troponin:      BNP:     Lipid Panel:       Current Medications:  abacavir-dolutegravir-lamivud, 1 tablet, oral, Daily  docusate sodium, 100 mg, oral, BID  escitalopram, 10 mg, oral, Daily  melatonin, 3 mg, oral, Daily  meropenem, 1 g, intravenous, q8h           XR hand left 3+ views    Result Date: 11/29/2023  Interpreted By:  Yennifer Aguirre, STUDY: XR HAND LEFT 3+ VIEWS; ;  11/29/2023 8:44 pm   INDICATION: Signs/Symptoms:wound.   COMPARISON: None.   ACCESSION NUMBER(S): [id number]   ORDERING CLINICIAN: LING GALEANO   FINDINGS: Soft tissue swelling and probable ulceration overlying the dorsal aspect of the metatarsals. No radiopaque foreign body is seen. No soft tissue gas. No osseous  erosions. Ossific density adjacent to the base of the proximal phalanx, middle finger, laterally suggestive of an avulsion fracture of uncertain age.       1. Moderate diffuse soft tissue swelling of the left hand worse dorsally overlying the metatarsals. No evidence for soft tissue gas or radiopaque foreign body is seen radiographically. No osseous erosions to suggest osteomyelitis.   2. Subtle avulsion fracture involving the base of the proximal phalanx, middle finger as above. Correlation with prior history of traumatic injury recommended.     MACRO: None   Signed by: Yennifer Aguirre 11/29/2023 9:15 PM Dictation workstation:   [id number]       No results found for this or any previous visit from the past 1095 days.                       Assessment:    Patient Active Problem List   Diagnosis    ADHD (attention deficit hyperactivity disorder), combined type    Anemia    Anxiety    Asthma    COPD (chronic obstructive pulmonary disease) (CMS/[facility])    Dysphagia    Encephalopathy    HIV-1 (human immunodeficiency virus I) (CMS/[facility])    Ischemic heart disease    Laceration of finger    Lung nodule    Vertigo    Vitamin D deficiency    Failure of outpatient treatment    Abscess of left hand      Left hand abscess    Plan  -Plan for irrigation and debridement left hand today well informed of risk versus benefits verbalized understanding would like to proceed with procedure                Electronically signed by CAROLA Sanchez-CNP on 11/30/2023 at 7:38 AM     Seen and examined. Agree with above. Left hand with dorsal abscess. OR today for I&D,    Shannon Upton MD

## 2023-11-30 NOTE — ED PROVIDER NOTES
HPI   Chief Complaint   Patient presents with    Wound Infection       History provided by: Patient    Limitations to history: None    CC: Left arm wound check    HPI: 38-year-old male presents the emergency department to be evaluated for left arm wound check.  He believes that he was bit by a spider on the back of his left hand about a week ago.  He says that he has been experiencing a larger wound that is been having purulent and foul-smelling discharge.  He says that over the last few days his hand has become more red, swollen, and painful.  States that it is now spreading up his arm.  He was originally putting topical antibiotics on it with little to no relief so he went to [facility] late last night and was discharged on p.o. Keflex and Bactrim.  States it is not getting any better and now he is experiencing fever and chills that have been controlled with p.o. Tylenol.  He denies take anything for his pain.  He has had a history of staph infections before but denies history of diabetes or IV drug use.  Denies weakness and fatigue.  Denies history of blood clots and denies recent plane flights and surgeries.  Denies chest pain, difficulty breathing, cough, pleuritic discomfort, hemoptysis.  Denies all other systemic symptoms.    ROS: Negative unless mentioned in HPI    Social Hx: Denies tobacco, alcohol, drug use    Medical Hx: Denies history of chronic medical conditions medication use. Allergy to penicillin. Immunizations up-to-date.    Surgical HX: Denies    Physical exam:    Constitutional: Patient is well-nourished and well-developed.  Appears to not be feeling well but nontoxic in appearance.  Oriented to person, place, time, and situation.    HEENT: Head is normocephalic, atraumatic. Patient's airway is patent.  Tympanic membranes are clear bilaterally.  Nasal mucosa clear.  Mouth with normal mucosa.  Throat is not erythematous and there are no oropharyngeal exudates, uvula is midline.  No obvious facial  deformities.    Eyes: Clear bilaterally.  Pupils are equal round and reactive to light and accommodation.  Extraocular movements intact.      Cardiac: Tachycardic, regular rhythm.  Heart sounds S1, S2.  No murmurs, rubs, or gallops.  PMI nondisplaced.  No JVD.    Respiratory: Regular respiratory rate and effort.  Breath sounds are clear and equal bilaterally, no adventitious lung sounds.  Patient is speaking in full sentences and is in no apparent respiratory distress. No use of accessory muscles.      Gastrointestinal: Abdomen is soft, nondistended, and nontender.  There are no obvious deformities.  No rebound tenderness or guarding.  Bowel sounds are normal active.    Genitourinary: No CVA or flank tenderness.    Musculoskeletal: Patient has a large fluctuant and purulent wound over the dorsal aspect of the left hand.  Patient has substantial nonpitting edema and skin redness and warmth of the entire hand that is spreading up his arm almost to the elbow.  No other open wounds.  Patient is reluctant to move the extremity due to his pain.  Patient has equal range of motion in all extremities and no strength deficiencies.  No back or neck tenderness.  Capillary refill less than 3 seconds.  Strong peripheral pulses.  No sensory deficits.    Neurological: Patient is alert and oriented.  No focal deficits.  5/5 strength in all extremities.  Cranial nerves II through XII intact. GCS15.     Skin: Skin is normal color for race and is warm, dry, and intact.  No evidence of trauma.  No lesions, rashes, bruising, jaundice, or masses.    Psych: Appropriate mood and affect.  No apparent risk to self or others.    Heme/lymph: No adenopathy, lymphadenopathy, or splenomegaly    Physical exam is otherwise negative unless stated above or in history of present illness.                          No data recorded                Patient History   Past Medical History:   Diagnosis Date    Personal history of other diseases of the nervous  system and sense organs     History of migraine    Personal history of other mental and behavioral disorders     History of attention deficit hyperactivity disorder (ADHD)     Past Surgical History:   Procedure Laterality Date    LUNG SURGERY  05/18/2017    Lung Surgery    OTHER SURGICAL HISTORY  05/18/2017    Oral Surgery Tooth Extraction [facility]     Family History   Problem Relation Name Age of Onset    Hypertension Mother      Hypertension Father       Social History     Tobacco Use    Smoking status: Every Day     Packs/day: .5     Types: Cigarettes    Smokeless tobacco: Never   Vaping Use    Vaping Use: Never used   Substance Use Topics    Alcohol use: Defer    Drug use: Defer       Physical Exam   ED Triage Vitals [11/29/23 1952]   Temp Heart Rate Resp BP   37.7 °C (99.9 °F) (!) 128 18 162/68      SpO2 Temp Source Heart Rate Source Patient Position   98 % Temporal Monitor --      BP Location FiO2 (%)     -- --       Physical Exam    ED Course & MDM   Diagnoses as of 11/29/23 2131   Failure of outpatient treatment   Abscess of left hand   Cellulitis of left arm   Fever in adult   Patient updated on plan for lab testing, IV insertion, radiology imaging, and medications to be administered while in the ER (if indicated). Patient updated on expected wait times for testing and results. Patient provided my name and told to ask any staff member for questions or concerns if they should arise. Electronic medical record reviewed.     MDM    Upon initial assessment, patient appears to not be feeling well but nontoxic in appearance.    Patient presented to the emergency department with the chief complaint left arm wound check. Patient has a large fluctuant and purulent wound over the dorsal aspect of the left hand.  Patient has substantial nonpitting edema and skin redness and warmth of the entire hand that is spreading up his arm almost to the elbow.  No other open wounds.  Patient is reluctant to move the extremity  due to his pain.  Patient has equal range of motion in all extremities and no strength deficiencies.  No back or neck tenderness.  Capillary refill less than 3 seconds.  Strong peripheral pulses.  No sensory deficits. On arrival to the emergency department, vital signs were significant for tachycardia.  Patient was started on a liter of lactated Ringer's.  Wound culture and blood cultures obtained and the patient was started on IV antibiotics based on his allergies.  Workup initial including basic blood work, inflammatory markers, and x-ray to rule out obvious signs of osteomyelitis.  Patient given IV lactated Ringer's and given IV morphine for his discomfort, ensure that he can tolerate this given his allergies.  Patient's lactate is within normal limits.  CRP is 9.79 and ESR 16.  CBC shows a normocytic anemia but no leukocytosis.  CMP shows hyponatremia 134.  X-ray shows soft tissue swelling but no obvious signs of osteomyelitis.  X-ray also shown subtle avulsion fracture involving the base of the proximal phalanx, middle finger.  My suspicion that this is an acute fracture and is low given that he has no traumatic injury.  I spoke to Dr. Houston with orthopedics who is agreeable to have Ortho consult in the morning in case the patient requires a washout or further evaluation for an infected/septic joint.  At this time, I do believe the patient benefit from hospital admission for IV antibiotics and further care given the extent of his infection and its rapid progression.  Spoke to the hospitalist nurse practitioner who is agreeable this plan.  Patient is agreeable with being admitted.  All questions and concerns addressed.  Reasons to return to ER discussed.  Patient verbalized understanding and agreement with the treatment plan and they remained hemodynamically stable in the ER.      This note was dictated using a speech recognition program.  While an attempt was made at proof-reading to minimize errors, minor  errors in transcription may be present      Medical Decision Making      Procedure  Procedures     Niranjan Wheeler PA-C  11/29/23 2133       Niranjan Wheeler PA-C  11/29/23 2133

## 2023-11-30 NOTE — ANESTHESIA POSTPROCEDURE EVALUATION
Patient: Mathieu Kelly    Procedure Summary       Date: 11/30/23 Room / Location: [facility] OR 11 / Virtual [facility] OR    Anesthesia Start: 1223 Anesthesia Stop: 1311    Procedure: IRRIGATION & DEBRIDEMENT LEFT DORSAL HAND (Left: Hand) Diagnosis:       Abscess of left hand      (Abscess of left hand [L02.512])    Surgeons: Shannon Upton MD Responsible Provider: Bertin Flanagan DO    Anesthesia Type: general ASA Status: 2 - Emergent            Anesthesia Type: general    Vitals Value Taken Time   /75 11/30/23 1306   Temp 37 11/30/23 1311   Pulse 94 11/30/23 1310   Resp 18 11/30/23 1310   SpO2 88 % 11/30/23 1310   Vitals shown include unvalidated device data.    Anesthesia Post Evaluation    Patient location during evaluation: bedside  Patient participation: complete - patient participated  Level of consciousness: awake  Pain score: 1  Pain management: adequate  Airway patency: patent  Cardiovascular status: acceptable  Respiratory status: acceptable  Hydration status: acceptable  Postoperative Nausea and Vomiting: none        There were no known notable events for this encounter.

## 2023-11-30 NOTE — OP NOTE
IRRIGATION & DEBRIDEMENT LEFT DORSAL HAND (L) Operative Note     Date: 2023  OR Location: [facility]    Name: Mathieu Kelly, : 1985, Age: 38 y.o., MRN: [id number], Sex: male    Diagnosis  Pre-op Diagnosis     * Abscess of left hand [L02.512] Post-op Diagnosis     * Abscess of left hand [L02.512]     Procedures  IRRIGATION & DEBRIDEMENT LEFT DORSAL HAND  89748 - [place] DEBRIDEMENT SUBCUTANEOUS TISSUE 20 SQ CM/<      Surgeons      * Shannon Romo [facility] - Primary    Resident/Fellow/Other Assistant:  Surgeon(s) and Role:    Procedure Summary  Anesthesia: General  ASA: II  Anesthesia Staff: Anesthesiologist: Bertin Flanagan DO  CRNA: CAROLA Deluna-CRNA  Estimated Blood Loss: 15mL  Intra-op Medications:   Medication Name Total Dose   sodium chloride 0.9 % irrigation solution 1,000 mL   BUPivacaine HCl (Marcaine) 0.25 % (2.5 mg/mL) injection 5 mL              Anesthesia Record               Intraprocedure I/O Totals          Intake    Propofol Drip 0.00 mL    The total shown is the total volume documented since Anesthesia Start was filed.    Total Intake 0 mL          Specimen: No specimens collected     Staff:   Circulator: Roula Avery RN  Scrub Person: Tariq Colbert         Drains and/or Catheters: * None in log *      Findings: Abscess dorsal left hand    Indications: Mathieu Kelly is an 38 y.o. male who is having surgery for Abscess of left hand [L02.512].  He developed abscess to the dorsal aspect of his left hand that has progressively worsened over the past few days.  Has been on antibiotics and has had increasing pain.  Fluctuance on exam.    The patient was seen in the preoperative area. The risks, benefits, complications, treatment options, non-operative alternatives, expected recovery and outcomes were discussed with the patient. The possibilities of reaction to medication, pulmonary aspiration, injury to surrounding structures, bleeding, recurrent infection, the need  for additional procedures, failure to diagnose a condition, and creating a complication requiring transfusion or operation were discussed with the patient. The patient concurred with the proposed plan, giving informed consent.  The site of surgery was properly noted/marked if necessary per policy. The patient has been actively warmed in preoperative area. Preoperative antibiotics have been ordered and given within 1 hours of incision. Venous thrombosis prophylaxis are not indicated.    Procedure Details: Patient brought to the operating room and transferred the OR table.  Hand prepped and draped in standard sterile fashion.  5 cm incision made over the dorsal aspect of the left hand in line with the fourth finger.  Pus was immediately encountered.  This was sent for culture.  Care was taken to expose and protect the extensor tendons.  Abscess was thoroughly debrided.  3 L of normal saline was utilized for irrigation.  After this there was no further evidence of pus.  There was a sizable defect following this.  Wick was placed into this defect and the wound was closed with chromic.    Plan for wick to be removed tomorrow and to initiate daily dry dressing changes.    Continue IV antibiotics.  Follow-up cultures.  Appreciate ID input.    Complications:  None; patient tolerated the procedure well.    Disposition: PACU - hemodynamically stable.  Condition: stable     Attending Attestation: I was present and scrubbed for the entire procedure.    Shannon Romo [facility]  Phone Number: [phone]

## 2023-12-01 PROCEDURE — 1100000001 HC PRIVATE ROOM DAILY

## 2023-12-01 PROCEDURE — 2500000004 HC RX 250 GENERAL PHARMACY W/ HCPCS (ALT 636 FOR OP/ED): Performed by: NURSE PRACTITIONER

## 2023-12-01 PROCEDURE — 99231 SBSQ HOSP IP/OBS SF/LOW 25: CPT | Performed by: NURSE PRACTITIONER

## 2023-12-01 PROCEDURE — 2500000001 HC RX 250 WO HCPCS SELF ADMINISTERED DRUGS (ALT 637 FOR MEDICARE OP): Performed by: STUDENT IN AN ORGANIZED HEALTH CARE EDUCATION/TRAINING PROGRAM

## 2023-12-01 PROCEDURE — 2500000001 HC RX 250 WO HCPCS SELF ADMINISTERED DRUGS (ALT 637 FOR MEDICARE OP): Performed by: NURSE PRACTITIONER

## 2023-12-01 PROCEDURE — 99231 SBSQ HOSP IP/OBS SF/LOW 25: CPT | Performed by: STUDENT IN AN ORGANIZED HEALTH CARE EDUCATION/TRAINING PROGRAM

## 2023-12-01 PROCEDURE — 99221 1ST HOSP IP/OBS SF/LOW 40: CPT | Performed by: PLASTIC SURGERY

## 2023-12-01 RX ORDER — LISDEXAMFETAMINE DIMESYLATE CAPSULES 10 MG/1
70 CAPSULE ORAL DAILY
Status: DISCONTINUED | OUTPATIENT
Start: 2023-12-01 | End: 2023-12-03 | Stop reason: HOSPADM

## 2023-12-01 RX ADMIN — HYDROMORPHONE HYDROCHLORIDE 0.5 MG: 1 INJECTION, SOLUTION INTRAMUSCULAR; INTRAVENOUS; SUBCUTANEOUS at 18:22

## 2023-12-01 RX ADMIN — OXYCODONE HYDROCHLORIDE 10 MG: 5 TABLET ORAL at 15:40

## 2023-12-01 RX ADMIN — Medication 3 MG: at 18:22

## 2023-12-01 RX ADMIN — HYDROMORPHONE HYDROCHLORIDE 0.5 MG: 1 INJECTION, SOLUTION INTRAMUSCULAR; INTRAVENOUS; SUBCUTANEOUS at 23:10

## 2023-12-01 RX ADMIN — ESCITALOPRAM OXALATE 10 MG: 10 TABLET, FILM COATED ORAL at 08:12

## 2023-12-01 RX ADMIN — DOCUSATE SODIUM 100 MG: 100 CAPSULE, LIQUID FILLED ORAL at 08:13

## 2023-12-01 RX ADMIN — MEROPENEM 1 G: 1 INJECTION, POWDER, FOR SOLUTION INTRAVENOUS at 00:57

## 2023-12-01 RX ADMIN — MEROPENEM 1 G: 1 INJECTION, POWDER, FOR SOLUTION INTRAVENOUS at 23:09

## 2023-12-01 RX ADMIN — MEROPENEM 1 G: 1 INJECTION, POWDER, FOR SOLUTION INTRAVENOUS at 08:12

## 2023-12-01 RX ADMIN — OXYCODONE HYDROCHLORIDE 10 MG: 5 TABLET ORAL at 00:58

## 2023-12-01 RX ADMIN — OXYCODONE HYDROCHLORIDE 10 MG: 5 TABLET ORAL at 08:12

## 2023-12-01 RX ADMIN — HYDROMORPHONE HYDROCHLORIDE 0.5 MG: 1 INJECTION, SOLUTION INTRAMUSCULAR; INTRAVENOUS; SUBCUTANEOUS at 10:25

## 2023-12-01 RX ADMIN — MEROPENEM 1 G: 1 INJECTION, POWDER, FOR SOLUTION INTRAVENOUS at 15:41

## 2023-12-01 RX ADMIN — ABACAVIR SULFATE, DOLUTEGRAVIR SODIUM, LAMIVUDINE 1 TABLET: 600; 50; 300 TABLET, FILM COATED ORAL at 08:13

## 2023-12-01 ASSESSMENT — COGNITIVE AND FUNCTIONAL STATUS - GENERAL
DAILY ACTIVITIY SCORE: 24
DAILY ACTIVITIY SCORE: 24
MOBILITY SCORE: 24
MOBILITY SCORE: 24

## 2023-12-01 ASSESSMENT — PAIN SCALES - GENERAL
PAINLEVEL_OUTOF10: 8
PAINLEVEL_OUTOF10: 9
PAINLEVEL_OUTOF10: 7
PAINLEVEL_OUTOF10: 4
PAINLEVEL_OUTOF10: 9
PAINLEVEL_OUTOF10: 6
PAINLEVEL_OUTOF10: 6
PAINLEVEL_OUTOF10: 9
PAINLEVEL_OUTOF10: 4

## 2023-12-01 ASSESSMENT — PAIN DESCRIPTION - ORIENTATION
ORIENTATION: LEFT

## 2023-12-01 ASSESSMENT — PAIN DESCRIPTION - LOCATION
LOCATION: HAND

## 2023-12-01 ASSESSMENT — PAIN - FUNCTIONAL ASSESSMENT
PAIN_FUNCTIONAL_ASSESSMENT: 0-10

## 2023-12-01 NOTE — CONSULTS
PLASTIC  SURGERY  CONSULTATION        Reason for consultation:             History of present illness:      38-year-old male history of HIV developed an abscess on the left dorsal hand and is status post incision and drainage by Dr. Upton        Past medical history:      ADHD  Anxiety  Asthma  HIV        Past surgical history:      Lung surgery      Medications:       Lexapro  Melatonin  Meropenem  Metronidazole  Morphine  Triumeq    ALLERGIES:      Tramadol  Codeine  Toradol  Vancomycin        Social history:      Smokes half pack per day      Family history:      Hypertension            Review of systems:  At least 10 systems reviewed and are otherwise negative except for as noted in the history of present illness                PHYSICAL  EXAMINATION       Height:     6 foot 1 inch               weight:      181 pounds                    Vital signs:   Temperature 36.2 pulse 67 blood pressure 126/59    General: Well-developed,     male        in no acute distress, alert and oriented ×3    HEENT:   Within normal limits    Neck:   Supple, no observable masses    Lungs: Clear to auscultation    Heart: Regular rate and rhythm    Abdomen: Soft, nontender    Extremities: Full range of motion; dressing in place left dorsal hand    Neurological: Grossly within normal limits                Impression:      Right-hand-dominant male status post incision and drainage, abscess left dorsal hand      Recommendation:      Remove domenico today and apply dry dressing on a daily basis        Thank you for the referral.    Roland Reyes, MD        *These notes are being done using Dragon voice recognition technology and may include unintended errors with respect to translation of words, typographical errors or grammar errors which may not have been identified prior to finalization of the chart note.

## 2023-12-01 NOTE — PROGRESS NOTES
"Mathieu Kelly is a 38 y.o. male on day 2 of admission presenting with Failure of outpatient treatment.      Subjective   Denies any pain to left hand or numbnes       Objective   Left hand NVI brisk cap refill.   Last Recorded Vitals  Blood pressure 126/59, pulse 67, temperature 36.2 °C (97.2 °F), resp. rate 18, height 1.854 m (6' 1\"), weight 82.1 kg (181 lb), SpO2 94 %.    Physical Exam    Current Medications:  abacavir-dolutegravir-lamivud, 1 tablet, oral, Daily  docusate sodium, 100 mg, oral, BID  escitalopram, 10 mg, oral, Daily  melatonin, 3 mg, oral, Daily  meropenem, 1 g, intravenous, q8h           CBC:   Results from last 7 days   Lab Units 11/30/23 0549 11/29/23 2020   WBC AUTO x10*3/uL 6.2 9.9   RBC AUTO x10*6/uL 4.04* 4.20*   HEMOGLOBIN g/dL 12.5* 13.0*   HEMATOCRIT % 37.3* 37.7*   MCV fL 92 90   MCH pg 30.9 31.0   MCHC g/dL 33.5 34.5   RDW % 13.3 13.2   PLATELETS AUTO x10*3/uL 163 186     CMP:    Results from last 7 days   Lab Units 11/30/23 0550 11/29/23 2020   SODIUM mmol/L 134* 134*   POTASSIUM mmol/L 3.5 3.6   CHLORIDE mmol/L 105 103   CO2 mmol/L 24 23   BUN mg/dL 13 15   CREATININE mg/dL 1.23 1.27   GLUCOSE mg/dL 127* 112*   PROTEIN TOTAL g/dL  --  6.8   CALCIUM mg/dL 8.2* 8.9   BILIRUBIN TOTAL mg/dL  --  0.9   ALK PHOS U/L  --  46   AST U/L  --  22   ALT U/L  --  18     BMP:    Results from last 7 days   Lab Units 11/30/23 0550 11/29/23 2020   SODIUM mmol/L 134* 134*   POTASSIUM mmol/L 3.5 3.6   CHLORIDE mmol/L 105 103   CO2 mmol/L 24 23   BUN mg/dL 13 15   CREATININE mg/dL 1.23 1.27   CALCIUM mg/dL 8.2* 8.9   GLUCOSE mg/dL 127* 112*                     Assessment/Plan      Principal Problem:    Failure of outpatient treatment  Active Problems:    Abscess of left hand             Antwan Chen, APRN-CNP      "

## 2023-12-01 NOTE — PROGRESS NOTES
Aline from left hand wound were removed. Wound has improved since past 24 hours. Wound is smaller, there is no drainage and redness has improved. Applied DSD. Patient declined to have hand in ace wrap.  Wound Care Progress Note     Visit Date: 12/1/2023      Patient Name: Mathieu Kelly         MRN: [id number]                Reason for Visit: Wound Care Left hand        Wound History: 1 week     Pertinent Labs:   Albumin   Date Value Ref Range Status   11/29/2023 4.0 3.4 - 5.0 g/dL Final           Wound Assessment:           NEW                        Wound Incision Hand Dorsal;Left (Active)   No Date First Assessed or Time First Assessed found.   Primary Wound Type: (c) Incision  Location: Hand  Wound Location Orientation: Dorsal;Left   Number of days:      Wound Incision Hand Dorsal;Left (Active)   Site Assessment Clean;Dry;Intact 12/01/23 0812   Elisabeth-Wound Assessment Intact 11/30/23 2047   Sutures/Staple Line Approximated 11/30/23 1411   Drainage Description None 12/01/23 0812   Drainage Amount None 11/30/23 2047   Dressing ABD;Kerlix/rolled gauze;Other (Comment) 11/30/23 2047   Dressing Changed New 12/01/23 1052   Dressing Status Clean;Dry 12/01/23 1052                   Wound Team Plan: Continue with current wound care dressing changes     Flynn Castillo LPN  12/1/2023  1:44 PM

## 2023-12-01 NOTE — NURSING NOTE
"Patient called this RN into room and stated that one of his bottom teeth had fallen out. Patient states tooth was previously loose before surgery today, \"but not THAT loose\".   "

## 2023-12-01 NOTE — PROGRESS NOTES
12/01/23 1121   Discharge Planning   Living Arrangements Alone   Support Systems Family members;Friends/neighbors   Assistance Needed none PTA   Type of Residence Private residence   Home or Post Acute Services In home services   Type of Home Care Services Home nursing visits   Patient expects to be discharged to: HOME [facility]   Does the patient need discharge transport arranged? No   Patient Choice   Provider Choice list and CMS website (https://medicare.gov/care-compare#search) for post-acute Quality and Resource Measure Data were provided and reviewed with: Patient   Patient / Family choosing to utilize agency / facility established prior to hospitalization No     Pt admitted for failing outpt PO treatment for infected spider bite. I&D done yesterday. DSD to be applied. Pending wound cultures for final abx needs. Possible IV abx needed at dc. Currently on IV Merrem. Latosha calixto d/t reaction.

## 2023-12-01 NOTE — PROGRESS NOTES
"    Daily progress note    Mathieu Kelly is 38 y.o. male with past history of anxiety, asthma, ADHD, HIV on antiretroviral presented to the ED with a complaint of left-sided pain and swelling, patient is on Bactrim after wound culture was taken at [facility].  X-ray of the hand shows diffuse soft tissue swelling on the left was started on cefepime Flagyl Vanco and admitted.  ID was consulted patient was taken to surgery and had debridement for an abscess.  Admitted for left hand abscess cellulitis fever      Subjective  Left dorsal hand wound  Some pain at the area  No fever or chills  No nausea vomiting          Vital signs in last 24 hours:  Temp:  [35.7 °C (96.3 °F)-36.3 °C (97.3 °F)] 36.2 °C (97.2 °F)  Heart Rate:  [67-89] 67  Resp:  [18] 18  BP: (119-134)/(59-74) 126/59  /59   Pulse 67   Temp 36.2 °C (97.2 °F)   Resp 18   Ht 1.854 m (6' 1\")   Wt 82.1 kg (181 lb)   SpO2 94%   BMI 23.88 kg/m²    Intake/Output last 3 shifts:  I/O last 3 completed shifts:  In: 700 (8.5 mL/kg) [I.V.:600 (7.3 mL/kg); IV Piggyback:100]  Out: - (0 mL/kg)   Weight: 82.1 kg   Intake/Output this shift:  No intake/output data recorded.    Physical Exam  Constitutional:       General: He is not in acute distress.     Appearance: Normal appearance. He is not ill-appearing, toxic-appearing or diaphoretic.   HENT:      Head: Normocephalic and atraumatic.      Mouth/Throat:      Mouth: Mucous membranes are moist.      Pharynx: No oropharyngeal exudate.   Eyes:      Extraocular Movements: Extraocular movements intact.      Pupils: Pupils are equal, round, and reactive to light.   Cardiovascular:      Rate and Rhythm: Normal rate and regular rhythm.      Heart sounds: No murmur heard.  Pulmonary:      Effort: Pulmonary effort is normal. No respiratory distress.      Breath sounds: Normal breath sounds. No wheezing.   Abdominal:      General: Abdomen is flat. Bowel sounds are normal. There is no distension.      Tenderness: " There is no abdominal tenderness. There is no guarding or rebound.   Musculoskeletal:      Comments: Swelling and erythema over the left hand, dressing over the dorsal surface of the left hand   Skin:     Findings: No lesion or rash.   Neurological:      General: No focal deficit present.      Mental Status: He is alert and oriented to person, place, and time.      Cranial Nerves: No cranial nerve deficit.      Motor: No weakness.   Psychiatric:         Mood and Affect: Mood normal.         Behavior: Behavior normal.         Current medication   Current Facility-Administered Medications   Medication Dose Route Frequency Provider Last Rate Last Admin    abacavir-dolutegravir-lamivud (Triumeq) 600- mg per tablet 1 tablet  1 tablet oral Daily Antwan L April, APRN-CNP   1 tablet at 12/01/23 0813    acetaminophen (Tylenol) tablet 650 mg  650 mg oral q4h PRN Antwan L April, APRN-CNP   650 mg at 11/30/23 1358    Or    acetaminophen (Tylenol) oral liquid 650 mg  650 mg oral q4h PRN Antwan L April, APRN-CNP        Or    acetaminophen (Tylenol) suppository 650 mg  650 mg rectal q4h PRN Antwan L April, APRN-CNP        docusate sodium (Colace) capsule 100 mg  100 mg oral BID Antwan L April, APRN-CNP   100 mg at 12/01/23 0813    escitalopram (Lexapro) tablet 10 mg  10 mg oral Daily Antwan L April, APRN-CNP   10 mg at 12/01/23 0812    HYDROmorphone (Dilaudid) injection 0.5 mg  0.5 mg intravenous q4h PRN Antwan L April, APRN-CNP   0.5 mg at 12/01/23 1025    lactated Ringer's infusion  50 mL/hr intravenous Continuous Antwan L April, APRN-CNP   Stopped at 11/30/23 2340    melatonin tablet 3 mg  3 mg oral Daily Atnwan L April, APRN-CNP   3 mg at 11/30/23 2026    meropenem (Merrem) in sodium chloride 0.9 % 100 mL IV 1 g  1 g intravenous q8h Antwan L April, APRN-CNP   Stopped at 12/01/23 1611    ondansetron (Zofran) tablet 4 mg  4 mg oral q8h PRN Antwan L April, APRN-ADRIAN        Or    ondansetron  (Zofran) injection 4 mg  4 mg intravenous q8h PRN Antwan Chen APRN-CNP   4 mg at 11/30/23 1227    oxyCODONE (Roxicodone) immediate release tablet 10 mg  10 mg oral q4h PRN Bertin Flanagan, DO   10 mg at 12/01/23 1540    oxyCODONE (Roxicodone) immediate release tablet 5 mg  5 mg oral q4h PRN Bertin Sarahger, DO        oxyCODONE (Roxicodone) immediate release tablet 5 mg  5 mg oral q4h PRN Bertin Flanagan, DO        oxyCODONE-acetaminophen (Percocet) 5-325 mg per tablet 1 tablet  1 tablet oral q6h PRN Antwan Chen APRN-CNP   1 tablet at 11/30/23 0251    oxyCODONE-acetaminophen (Percocet) 5-325 mg per tablet 1 tablet  1 tablet oral q4h PRN Antwan Chen, APRN-CNP        promethazine (Phenergan) 6.25 mg in sodium chloride 0.9% 50 mL IV  6.25 mg intravenous Once PRN Bertin Flanagan, DO            Labs  Lab Results   Component Value Date    WBC 6.2 11/30/2023    HGB 12.5 (L) 11/30/2023    HCT 37.3 (L) 11/30/2023    MCV 92 11/30/2023     11/30/2023     Lab Results   Component Value Date    HGBA1C 5.4 08/27/2018     Lab Results   Component Value Date    GLUCOSE 127 (H) 11/30/2023    CALCIUM 8.2 (L) 11/30/2023     (L) 11/30/2023    K 3.5 11/30/2023    CO2 24 11/30/2023     11/30/2023    BUN 13 11/30/2023    CREATININE 1.23 11/30/2023           Principal Problem:    Failure of outpatient treatment  Active Problems:    Abscess of left hand      Assessment and Plan   #Left hand abscess  #Cellulitis of the left hand  #Fever  #History of MRSA infection    Continue Merrem  Patient had incision and drainage of an abscess  Wound culture growing Staph aureus  Final wound culture pending  Patient developed a reaction after vancomycin in the ED and was discontinued  Continue local wound care  ID on board, will follow-up with ID regarding alternative antibiotics, patient has history of MRSA infection in the past  Plastic surgery and orthopedics on board  Pain management  Bowel regimen    #HIV on ART  retroviral  Continue current antiretroviral regimen      #History of anxiety  Continue home meds    DVT prophylaxis ambulate  Low risk     LOS: 2 days

## 2023-12-02 LAB
ANION GAP SERPL CALC-SCNC: 7 MMOL/L (ref 10–20)
BACTERIA SPEC CULT: ABNORMAL
BUN SERPL-MCNC: 21 MG/DL (ref 6–23)
CALCIUM SERPL-MCNC: 8.3 MG/DL (ref 8.6–10.3)
CHLORIDE SERPL-SCNC: 108 MMOL/L (ref 98–107)
CK SERPL-CCNC: 78 U/L (ref 0–325)
CO2 SERPL-SCNC: 29 MMOL/L (ref 21–32)
CREAT SERPL-MCNC: 1.12 MG/DL (ref 0.5–1.3)
ERYTHROCYTE [DISTWIDTH] IN BLOOD BY AUTOMATED COUNT: 13.3 % (ref 11.5–14.5)
GFR SERPL CREATININE-BSD FRML MDRD: 86 ML/MIN/1.73M*2
GLUCOSE SERPL-MCNC: 96 MG/DL (ref 74–99)
GRAM STN SPEC: ABNORMAL
GRAM STN SPEC: ABNORMAL
HCT VFR BLD AUTO: 33.9 % (ref 41–52)
HGB BLD-MCNC: 11.3 G/DL (ref 13.5–17.5)
MCH RBC QN AUTO: 31 PG (ref 26–34)
MCHC RBC AUTO-ENTMCNC: 33.3 G/DL (ref 32–36)
MCV RBC AUTO: 93 FL (ref 80–100)
NRBC BLD-RTO: 0 /100 WBCS (ref 0–0)
PLATELET # BLD AUTO: 192 X10*3/UL (ref 150–450)
POTASSIUM SERPL-SCNC: 4.1 MMOL/L (ref 3.5–5.3)
RBC # BLD AUTO: 3.65 X10*6/UL (ref 4.5–5.9)
SODIUM SERPL-SCNC: 140 MMOL/L (ref 136–145)
WBC # BLD AUTO: 6.6 X10*3/UL (ref 4.4–11.3)

## 2023-12-02 PROCEDURE — 82374 ASSAY BLOOD CARBON DIOXIDE: CPT | Performed by: STUDENT IN AN ORGANIZED HEALTH CARE EDUCATION/TRAINING PROGRAM

## 2023-12-02 PROCEDURE — 2500000004 HC RX 250 GENERAL PHARMACY W/ HCPCS (ALT 636 FOR OP/ED): Performed by: INTERNAL MEDICINE

## 2023-12-02 PROCEDURE — 82550 ASSAY OF CK (CPK): CPT | Performed by: INTERNAL MEDICINE

## 2023-12-02 PROCEDURE — 1100000001 HC PRIVATE ROOM DAILY

## 2023-12-02 PROCEDURE — 2500000001 HC RX 250 WO HCPCS SELF ADMINISTERED DRUGS (ALT 637 FOR MEDICARE OP): Performed by: NURSE PRACTITIONER

## 2023-12-02 PROCEDURE — 2500000001 HC RX 250 WO HCPCS SELF ADMINISTERED DRUGS (ALT 637 FOR MEDICARE OP): Performed by: STUDENT IN AN ORGANIZED HEALTH CARE EDUCATION/TRAINING PROGRAM

## 2023-12-02 PROCEDURE — 36415 COLL VENOUS BLD VENIPUNCTURE: CPT | Performed by: STUDENT IN AN ORGANIZED HEALTH CARE EDUCATION/TRAINING PROGRAM

## 2023-12-02 PROCEDURE — 85027 COMPLETE CBC AUTOMATED: CPT | Performed by: STUDENT IN AN ORGANIZED HEALTH CARE EDUCATION/TRAINING PROGRAM

## 2023-12-02 PROCEDURE — 99232 SBSQ HOSP IP/OBS MODERATE 35: CPT | Performed by: STUDENT IN AN ORGANIZED HEALTH CARE EDUCATION/TRAINING PROGRAM

## 2023-12-02 PROCEDURE — 2500000004 HC RX 250 GENERAL PHARMACY W/ HCPCS (ALT 636 FOR OP/ED): Performed by: NURSE PRACTITIONER

## 2023-12-02 RX ADMIN — DAPTOMYCIN 500 MG: 500 INJECTION, POWDER, LYOPHILIZED, FOR SOLUTION INTRAVENOUS at 20:16

## 2023-12-02 RX ADMIN — MEROPENEM 1 G: 1 INJECTION, POWDER, FOR SOLUTION INTRAVENOUS at 08:55

## 2023-12-02 RX ADMIN — Medication 3 MG: at 20:19

## 2023-12-02 RX ADMIN — HYDROMORPHONE HYDROCHLORIDE 0.5 MG: 1 INJECTION, SOLUTION INTRAMUSCULAR; INTRAVENOUS; SUBCUTANEOUS at 14:00

## 2023-12-02 RX ADMIN — ESCITALOPRAM OXALATE 10 MG: 10 TABLET, FILM COATED ORAL at 08:55

## 2023-12-02 RX ADMIN — DOCUSATE SODIUM 100 MG: 100 CAPSULE, LIQUID FILLED ORAL at 08:55

## 2023-12-02 RX ADMIN — HYDROMORPHONE HYDROCHLORIDE 0.5 MG: 1 INJECTION, SOLUTION INTRAMUSCULAR; INTRAVENOUS; SUBCUTANEOUS at 09:01

## 2023-12-02 RX ADMIN — DOCUSATE SODIUM 100 MG: 100 CAPSULE, LIQUID FILLED ORAL at 20:19

## 2023-12-02 RX ADMIN — HYDROMORPHONE HYDROCHLORIDE 0.5 MG: 1 INJECTION, SOLUTION INTRAMUSCULAR; INTRAVENOUS; SUBCUTANEOUS at 23:09

## 2023-12-02 RX ADMIN — OXYCODONE HYDROCHLORIDE 10 MG: 5 TABLET ORAL at 21:08

## 2023-12-02 RX ADMIN — OXYCODONE HYDROCHLORIDE AND ACETAMINOPHEN 1 TABLET: 5; 325 TABLET ORAL at 14:55

## 2023-12-02 RX ADMIN — HYDROMORPHONE HYDROCHLORIDE 0.5 MG: 1 INJECTION, SOLUTION INTRAMUSCULAR; INTRAVENOUS; SUBCUTANEOUS at 18:21

## 2023-12-02 RX ADMIN — MEROPENEM 1 G: 1 INJECTION, POWDER, FOR SOLUTION INTRAVENOUS at 16:25

## 2023-12-02 RX ADMIN — HYDROMORPHONE HYDROCHLORIDE 0.5 MG: 1 INJECTION, SOLUTION INTRAMUSCULAR; INTRAVENOUS; SUBCUTANEOUS at 05:04

## 2023-12-02 RX ADMIN — ABACAVIR SULFATE, DOLUTEGRAVIR SODIUM, LAMIVUDINE 1 TABLET: 600; 50; 300 TABLET, FILM COATED ORAL at 08:55

## 2023-12-02 ASSESSMENT — PAIN SCALES - GENERAL
PAINLEVEL_OUTOF10: 4
PAINLEVEL_OUTOF10: 8
PAINLEVEL_OUTOF10: 9
PAINLEVEL_OUTOF10: 4
PAINLEVEL_OUTOF10: 8
PAINLEVEL_OUTOF10: 3
PAINLEVEL_OUTOF10: 8

## 2023-12-02 ASSESSMENT — COGNITIVE AND FUNCTIONAL STATUS - GENERAL
DAILY ACTIVITIY SCORE: 24
MOBILITY SCORE: 24
MOBILITY SCORE: 24
DAILY ACTIVITIY SCORE: 24

## 2023-12-02 ASSESSMENT — PAIN DESCRIPTION - LOCATION
LOCATION: HAND
LOCATION: HAND

## 2023-12-02 ASSESSMENT — PAIN DESCRIPTION - ORIENTATION
ORIENTATION: LEFT
ORIENTATION: LEFT

## 2023-12-02 ASSESSMENT — PAIN - FUNCTIONAL ASSESSMENT
PAIN_FUNCTIONAL_ASSESSMENT: 0-10

## 2023-12-02 NOTE — PROGRESS NOTES
?  HISTORY OF PRESENT ILLNESS:    Hand pain. No fevers    Current Medications:    Current Facility-Administered Medications   Medication Dose Route Frequency Provider Last Rate Last Admin    abacavir-dolutegravir-lamivud (Triumeq) 600- mg per tablet 1 tablet  1 tablet oral Daily Antwan Chen, APRN-CNP   1 tablet at 12/01/23 0813    acetaminophen (Tylenol) tablet 650 mg  650 mg oral q4h PRN Antwan L April, APRN-CNP   650 mg at 11/30/23 1358    Or    acetaminophen (Tylenol) oral liquid 650 mg  650 mg oral q4h PRN Antwan L April, APRN-CNP        Or    acetaminophen (Tylenol) suppository 650 mg  650 mg rectal q4h PRN Antwan Chen, APRN-CNP        docusate sodium (Colace) capsule 100 mg  100 mg oral BID Antwan Chen, APRN-CNP   100 mg at 12/01/23 0813    escitalopram (Lexapro) tablet 10 mg  10 mg oral Daily Antwan Chen, APRN-CNP   10 mg at 12/01/23 0812    HYDROmorphone (Dilaudid) injection 0.5 mg  0.5 mg intravenous q4h PRN Antwan Chen, APRN-CNP   0.5 mg at 12/01/23 2310    lactated Ringer's infusion  50 mL/hr intravenous Continuous Antwan Chen, APRN-CNP   Stopped at 11/30/23 2340    lisdexamfetamine (Vyvanse) capsule 70 mg  70 mg oral Daily Angeline Baltazar APRN-CNP        melatonin tablet 3 mg  3 mg oral Daily Antwan Chen, APRN-CNP   3 mg at 12/01/23 1822    meropenem (Merrem) in sodium chloride 0.9 % 100 mL IV 1 g  1 g intravenous q8h Antwan Chen, APRN-CNP   Stopped at 12/01/23 2339    ondansetron (Zofran) tablet 4 mg  4 mg oral q8h PRN Antwan Chen, APRN-CNP        Or    ondansetron (Zofran) injection 4 mg  4 mg intravenous q8h PRN Antwan Chen, APRN-CNP   4 mg at 11/30/23 1227    oxyCODONE (Roxicodone) immediate release tablet 10 mg  10 mg oral q4h PRN Bertin Flanagan DO   10 mg at 12/01/23 1540    oxyCODONE (Roxicodone) immediate release tablet 5 mg  5 mg oral q4h PRN Bertin Flanagan DO        oxyCODONE (Roxicodone) immediate release tablet 5 mg  5  "mg oral q4h PRN Bertin Flanagan DO        oxyCODONE-acetaminophen (Percocet) 5-325 mg per tablet 1 tablet  1 tablet oral q6h PRN JAM Sanchez   1 tablet at 11/30/23 0251    oxyCODONE-acetaminophen (Percocet) 5-325 mg per tablet 1 tablet  1 tablet oral q4h PRN CAROLA Sanchez-ADRIAN        promethazine (Phenergan) 6.25 mg in sodium chloride 0.9% 50 mL IV  6.25 mg intravenous Once PRN Bertin Flanagan DO            Allergies:    Allergies   Allergen Reactions    Tramadol Nausea/vomiting     Does not remember what the reaction was to tramadol    Codeine Unknown and Itching    Ketorolac Unknown, Dizziness, Nausea Only and Rash    Vancomycin Rash     Red man syndrome          Review of Systems  14 system review is negative other than HPI     /76 (BP Location: Right arm, Patient Position: Sitting)   Pulse 67   Temp 36.6 °C (97.9 °F)   Resp 16   Ht 1.854 m (6' 1\")   Wt 82.1 kg (181 lb)   SpO2 94%   BMI 23.88 kg/m²    Physical Exam:  General: Patient appears ok at the present time. NAD  Skin: no new rashes  HEENT:  Neck is supple, No subconjunctival   Abdomen: soft, ND, NTTP,  Extrem: hand dressed           DATA:    .  Lab Results   Component Value Date    WBC 6.2 11/30/2023    HGB 12.5 (L) 11/30/2023    HCT 37.3 (L) 11/30/2023    MCV 92 11/30/2023     11/30/2023     Results from last 7 days   Lab Units 11/30/23  0550 11/29/23 2020   SODIUM mmol/L 134* 134*   POTASSIUM mmol/L 3.5 3.6   CHLORIDE mmol/L 105 103   CO2 mmol/L 24 23   BUN mg/dL 13 15   CREATININE mg/dL 1.23 1.27   CALCIUM mg/dL 8.2* 8.9   PROTEIN TOTAL g/dL  --  6.8   BILIRUBIN TOTAL mg/dL  --  0.9   ALK PHOS U/L  --  46   ALT U/L  --  18   AST U/L  --  22   GLUCOSE mg/dL 127* 112*   Susceptibility data from last 90 days.  Collected Specimen Info Organism   11/29/23 Tissue/Biopsy from Wound/Tissue Staphylococcus aureus   Susceptibility data from last 90 days.  Collected Specimen Info Organism   11/29/23 Tissue/Biopsy from " Wound/Tissue Staphylococcus aureus           IMPRESSION:        Problem List Items Addressed This Visit          Advance Directives and General Issues    * (Principal) Failure of outpatient treatment - Primary       Infectious Diseases    Abscess of left hand    Relevant Orders    Case Request Operating Room: Debridement Hand (Completed)    Tissue/Wound Culture/Smear (Completed)     Other Visit Diagnoses       Cellulitis of left arm        Fever in adult             HIV  PLAN:   Given vancomycin and meropenem, continue for now, await cultures  Continue current  HIV regimen      Gasper Long MD

## 2023-12-02 NOTE — PROGRESS NOTES
Orthopedics Progress Note  Patient: Mathieu Kelly  Unit/Bed: 618/618-A  YOB: 1985  MRN: [id number]  Acct: [id number]   Admitting Diagnosis: Abscess of left hand [L02.512]  Cellulitis of left arm [L03.114]  Fever in adult [R50.9]  Failure of outpatient treatment [Z78.9]  Date:  11/29/2023  Hospital Day: 3  Attending: Tati Mcmullen MD     Chief Complaint   Patient presents with    Wound Infection        SUBJECTIVE    Patient seen and examined this morning. No acute events overnight.      VITALS      Vitals:    12/01/23 0740 12/01/23 1954 12/02/23 0501 12/02/23 0746   BP: 126/59 142/76 120/70 117/67   BP Location:  Right arm     Patient Position:  Sitting     Pulse:   55 56   Resp: 18 16  16   Temp: 36.2 °C (97.2 °F) 36.6 °C (97.9 °F) 36.2 °C (97.2 °F) 36.8 °C (98.2 °F)   TempSrc:       SpO2: 94%  96% 99%   Weight:       Height:           Intake/Output Summary (Last 24 hours) at 12/2/2023 0757  Last data filed at 12/1/2023 1954  Gross per 24 hour   Intake 800 ml   Output --   Net 800 ml      Wt Readings from Last 4 Encounters:   11/29/23 82.1 kg (181 lb)   10/31/23 83 kg (183 lb)   05/15/23 82.1 kg (181 lb)   02/14/23 80.9 kg (178 lb 6 oz)        Allergies:  Allergies   Allergen Reactions    Tramadol Nausea/vomiting     Does not remember what the reaction was to tramadol    Codeine Unknown and Itching    Ketorolac Unknown, Dizziness, Nausea Only and Rash    Vancomycin Rash     Red man syndrome        PHYSICAL EXAM   Physical Exam  HENT:      Mouth/Throat:      Mouth: Mucous membranes are dry.      Pharynx: Oropharynx is clear.   Cardiovascular:      Rate and Rhythm: Normal rate.      Pulses: Normal pulses.      Heart sounds: S1 normal and S2 normal.   Pulmonary:      Effort: Pulmonary effort is normal.   Musculoskeletal:         General: No swelling. Normal range of motion.      Left hand: Swelling and tenderness present.      Cervical back: Normal range of motion.      Comments: Capillary  refill <2, radial pulse palpable, sensations are intact.    Skin:     General: Skin is warm and dry.      Capillary Refill: Capillary refill takes less than 2 seconds.   Neurological:      General: No focal deficit present.      Mental Status: He is alert and oriented to person, place, and time.   Psychiatric:         Attention and Perception: Attention normal.         Mood and Affect: Mood normal.         Speech: Speech normal.         Behavior: Behavior normal. Behavior is cooperative.         LABS   CBC:   Results from last 7 days   Lab Units 12/02/23 0549 11/30/23 0549 11/29/23 2020   WBC AUTO x10*3/uL 6.6 6.2 9.9   RBC AUTO x10*6/uL 3.65* 4.04* 4.20*   HEMOGLOBIN g/dL 11.3* 12.5* 13.0*   HEMATOCRIT % 33.9* 37.3* 37.7*   MCV fL 93 92 90   MCH pg 31.0 30.9 31.0   MCHC g/dL 33.3 33.5 34.5   RDW % 13.3 13.3 13.2   PLATELETS AUTO x10*3/uL 192 163 186     CMP:    Results from last 7 days   Lab Units 12/02/23 0549 11/30/23 0550 11/29/23 2020   SODIUM mmol/L 140 134* 134*   POTASSIUM mmol/L 4.1 3.5 3.6   CHLORIDE mmol/L 108* 105 103   CO2 mmol/L 29 24 23   BUN mg/dL 21 13 15   CREATININE mg/dL 1.12 1.23 1.27   GLUCOSE mg/dL 96 127* 112*   PROTEIN TOTAL g/dL  --   --  6.8   CALCIUM mg/dL 8.3* 8.2* 8.9   BILIRUBIN TOTAL mg/dL  --   --  0.9   ALK PHOS U/L  --   --  46   AST U/L  --   --  22   ALT U/L  --   --  18     BMP:    Results from last 7 days   Lab Units 12/02/23 0549 11/30/23 0550 11/29/23 2020   SODIUM mmol/L 140 134* 134*   POTASSIUM mmol/L 4.1 3.5 3.6   CHLORIDE mmol/L 108* 105 103   CO2 mmol/L 29 24 23   BUN mg/dL 21 13 15   CREATININE mg/dL 1.12 1.23 1.27   CALCIUM mg/dL 8.3* 8.2* 8.9   GLUCOSE mg/dL 96 127* 112*       abacavir-dolutegravir-lamivud, 1 tablet, oral, Daily  docusate sodium, 100 mg, oral, BID  escitalopram, 10 mg, oral, Daily  lisdexamfetamine, 70 mg, oral, Daily  melatonin, 3 mg, oral, Daily  meropenem, 1 g, intravenous, q8h       lactated Ringer's, 50 mL/hr, Last Rate: Stopped  (11/30/23 8310)       Current Outpatient Medications   Medication Instructions    abacavir-dolutegravir-lamivud (Triumeq) 600- mg tablet 1 tablet, oral, Daily    Antiseptic Skin Clnsr,chlorhe, 4 % external liquid APPLY TO AFFECTED AREA EVERY DAY AS NEEDED    escitalopram (LEXAPRO) 10 mg, oral, Daily    HYDROcodone-acetaminophen (Norco) 5-325 mg tablet 1 tablet, oral, Every 6 hours PRN    ibuprofen 800 mg tablet oral    lisdexamfetamine (VYVANSE) 70 mg, oral, Every morning    [START ON 12/15/2023] lisdexamfetamine (VYVANSE) 70 mg, oral, Daily RT    [START ON 1/15/2024] lisdexamfetamine (VYVANSE) 70 mg, oral, Every morning    mupirocin (Bactroban) 2 % ointment APPLY TO AFFECTED AREA 3 TIMES A DAY    SUMAtriptan (IMITREX) 100 mg, oral, Once as needed        XR hand left 3+ views    Result Date: 11/29/2023  Interpreted By:  Yennifer Aguirre, STUDY: XR HAND LEFT 3+ VIEWS; ;  11/29/2023 8:44 pm   INDICATION: Signs/Symptoms:wound.   COMPARISON: None.   ACCESSION NUMBER(S): [id number]   ORDERING CLINICIAN: LING GALEANO   FINDINGS: Soft tissue swelling and probable ulceration overlying the dorsal aspect of the metatarsals. No radiopaque foreign body is seen. No soft tissue gas. No osseous erosions. Ossific density adjacent to the base of the proximal phalanx, middle finger, laterally suggestive of an avulsion fracture of uncertain age.       1. Moderate diffuse soft tissue swelling of the left hand worse dorsally overlying the metatarsals. No evidence for soft tissue gas or radiopaque foreign body is seen radiographically. No osseous erosions to suggest osteomyelitis.   2. Subtle avulsion fracture involving the base of the proximal phalanx, middle finger as above. Correlation with prior history of traumatic injury recommended.     MACRO: None   Signed by: Yennifer Aguirre 11/29/2023 9:15 PM Dictation workstation:   [id number]      Assessment     Patient Active Problem List   Diagnosis    ADHD (attention deficit  hyperactivity disorder), combined type    Anemia    Anxiety    Asthma    COPD (chronic obstructive pulmonary disease) (CMS/HCC)    Dysphagia    Encephalopathy    HIV-1 (human immunodeficiency virus I) (CMS/[facility])    Ischemic heart disease    Laceration of finger    Lung nodule    Vertigo    Vitamin D deficiency    Failure of outpatient treatment    Abscess of left hand      Subjective  Patient resting comfortably in bed. He reports expected post operative pain. On examination, the dressing to his left hand is clean, dry, and intact. His sensations to his right hand are intact and his radial pulse is palpable. Awaiting final blood cultures to determine antibiotics.     Impression  POD 2 left hand I&D    Plan:  Pain control  DVT prophylaxis with SCDs and ambulation  Non weight bearing left hand  Appreciate recommendations from infectious disease  Discharge planning pending final cultures  Continue care per primary team       Electronically signed by JAM Daniels on 12/2/2023 at 7:57 AM

## 2023-12-02 NOTE — CARE PLAN
Problem: Skin  Goal: Decreased wound size/increased tissue granulation at next dressing change  Outcome: Progressing  Goal: Participates in plan/prevention/treatment measures  Outcome: Progressing  Goal: Prevent/manage excess moisture  Outcome: Progressing  Goal: Prevent/minimize sheer/friction injuries  Outcome: Progressing  Goal: Promote/optimize nutrition  Outcome: Progressing  Goal: Promote skin healing  Outcome: Progressing     Problem: Pain  Goal: Takes deep breaths with improved pain control throughout the shift  Outcome: Progressing  Goal: Walks with improved pain control throughout the shift  Outcome: Progressing  Goal: Performs ADL's with improved pain control throughout shift  Outcome: Progressing  Goal: Free from opioid side effects throughout the shift  Outcome: Progressing  Goal: Free from acute confusion related to pain meds throughout the shift  Outcome: Progressing

## 2023-12-02 NOTE — PROGRESS NOTES
?  HISTORY OF PRESENT ILLNESS:    Hand pain. No fevers.     Current Medications:    Current Facility-Administered Medications   Medication Dose Route Frequency Provider Last Rate Last Admin    abacavir-dolutegravir-lamivud (Triumeq) 600- mg per tablet 1 tablet  1 tablet oral Daily Antwan CESAR MaddenApril, APRN-CNP   1 tablet at 12/02/23 0855    acetaminophen (Tylenol) tablet 650 mg  650 mg oral q4h PRN Antwan L April, APRN-CNP   650 mg at 11/30/23 1358    Or    acetaminophen (Tylenol) oral liquid 650 mg  650 mg oral q4h PRN Antwan L April, APRN-CNP        Or    acetaminophen (Tylenol) suppository 650 mg  650 mg rectal q4h PRN Antwan Maddenidy, APRN-CNP        DAPTOmycin (Cubicin) 500 mg in sodium chloride 0.9% 50 mL IV  6 mg/kg intravenous q24h SANDRO Long MD        docusate sodium (Colace) capsule 100 mg  100 mg oral BID Antwan L April, APRN-CNP   100 mg at 12/02/23 0855    escitalopram (Lexapro) tablet 10 mg  10 mg oral Daily Antwan L April, APRN-CNP   10 mg at 12/02/23 0855    HYDROmorphone (Dilaudid) injection 0.5 mg  0.5 mg intravenous q4h PRN Antwan L April, APRN-CNP   0.5 mg at 12/02/23 1400    lactated Ringer's infusion  50 mL/hr intravenous Continuous Antwan Chen, APRN-CNP   Stopped at 11/30/23 2340    lisdexamfetamine (Vyvanse) capsule 70 mg  70 mg oral Daily Angeline Baltazar APRN-CNP        melatonin tablet 3 mg  3 mg oral Daily Antwan L April, APRN-CNP   3 mg at 12/01/23 1822    ondansetron (Zofran) tablet 4 mg  4 mg oral q8h PRN Antwan L April, APRN-CNP        Or    ondansetron (Zofran) injection 4 mg  4 mg intravenous q8h PRN Antwan Chen, APRN-CNP   4 mg at 11/30/23 1227    oxyCODONE (Roxicodone) immediate release tablet 10 mg  10 mg oral q4h PRN Bertin Flanagan DO   10 mg at 12/01/23 1540    oxyCODONE (Roxicodone) immediate release tablet 5 mg  5 mg oral q4h PRN Bertin Flanagan DO        oxyCODONE (Roxicodone) immediate release tablet 5 mg  5 mg oral q4h  "PRN Bertin Flanagan DO        oxyCODONE-acetaminophen (Percocet) 5-325 mg per tablet 1 tablet  1 tablet oral q6h PRN JAM Sanchez   1 tablet at 12/02/23 1455    oxyCODONE-acetaminophen (Percocet) 5-325 mg per tablet 1 tablet  1 tablet oral q4h PRN JAM Sanchez        promethazine (Phenergan) 6.25 mg in sodium chloride 0.9% 50 mL IV  6.25 mg intravenous Once PRN Bertin Flanagan DO            Allergies:    Allergies   Allergen Reactions    Tramadol Nausea/vomiting     Does not remember what the reaction was to tramadol    Codeine Unknown and Itching    Ketorolac Unknown, Dizziness, Nausea Only and Rash    Vancomycin Rash     Red man syndrome          Review of Systems  14 system review is negative other than HPI     /77   Pulse 61   Temp 36.8 °C (98.2 °F)   Resp 16   Ht 1.854 m (6' 1\")   Wt 82.1 kg (181 lb)   SpO2 96%   BMI 23.88 kg/m²    Physical Exam:  General: Patient appears ok at the present time. NAD  Skin: no new rashes  HEENT:  Neck is supple, No subconjunctival   Abdomen: soft, ND, NTTP,  Extrem: hand dressed, removed, closed incision yellowish drainage small , mildly tender, less swollen           DATA:    .  Lab Results   Component Value Date    WBC 6.6 12/02/2023    HGB 11.3 (L) 12/02/2023    HCT 33.9 (L) 12/02/2023    MCV 93 12/02/2023     12/02/2023     Results from last 7 days   Lab Units 12/02/23  0549 11/30/23  0550 11/29/23 2020   SODIUM mmol/L 140   < > 134*   POTASSIUM mmol/L 4.1   < > 3.6   CHLORIDE mmol/L 108*   < > 103   CO2 mmol/L 29   < > 23   BUN mg/dL 21   < > 15   CREATININE mg/dL 1.12   < > 1.27   CALCIUM mg/dL 8.3*   < > 8.9   PROTEIN TOTAL g/dL  --   --  6.8   BILIRUBIN TOTAL mg/dL  --   --  0.9   ALK PHOS U/L  --   --  46   ALT U/L  --   --  18   AST U/L  --   --  22   GLUCOSE mg/dL 96   < > 112*    < > = values in this interval not displayed.     Susceptibility data from last 90 days.  Collected Specimen Info Organism Clindamycin " Erythromycin Oxacillin Tetracycline Trimethoprim/Sulfamethoxazole Vancomycin   11/30/23 Swab from ABSCESS Staphylococcus aureus           Mixed Skin Microorganisms          11/29/23 Tissue/Biopsy from Wound/Tissue Methicillin Resistant Staphylococcus aureus (MRSA) S S R S S S     Susceptibility data from last 90 days.  Collected Specimen Info Organism Clindamycin Erythromycin Oxacillin Tetracycline Trimethoprim/Sulfamethoxazole Vancomycin   11/30/23 Swab from ABSCESS Staphylococcus aureus           Mixed Skin Microorganisms          11/29/23 Tissue/Biopsy from Wound/Tissue Methicillin Resistant Staphylococcus aureus (MRSA) S S R S S S             IMPRESSION:        Problem List Items Addressed This Visit          Advance Directives and General Issues    * (Principal) Failure of outpatient treatment - Primary       Infectious Diseases    Abscess of left hand    Relevant Orders    Case Request Operating Room: Debridement Hand (Completed)    Tissue/Wound Culture/Smear (Completed)     Other Visit Diagnoses       Cellulitis of left arm        Fever in adult             HIV  PLAN:   Given vancomycin and meropenem,initially . Pt had reaction apparently to vanco with rash and was held. Change to daptomycin , plan on oral antibiotics if cultures allow  Continue current  HIV regimen      Gasper Lnog MD

## 2023-12-02 NOTE — PROGRESS NOTES
"Daily progress note      Mathieu Kelly is 38 y.o. male admitted for abscess, however the left hand status post I&D    Subjective  Patient was seen and examined at bedside  The redness over the left hand and left arm is decreased  The wound over the dorsal aspect of the left arm is still draining some yellowish discharge  No fever or chills  Blood reaction to vancomycin was discontinued          Vital signs in last 24 hours:  Temp:  [36.2 °C (97.2 °F)-36.8 °C (98.2 °F)] 36.8 °C (98.2 °F)  Heart Rate:  [55-61] 61  Resp:  [16] 16  BP: (117-142)/(67-77) 125/77  /77   Pulse 61   Temp 36.8 °C (98.2 °F)   Resp 16   Ht 1.854 m (6' 1\")   Wt 82.1 kg (181 lb)   SpO2 96%   BMI 23.88 kg/m²    Intake/Output last 3 shifts:  I/O last 3 completed shifts:  In: 800 (9.7 mL/kg) [P.O.:800]  Out: - (0 mL/kg)   Weight: 82.1 kg   Intake/Output this shift:  I/O this shift:  In: 100 [IV Piggyback:100]  Out: -     Physical Exam  Constitutional:       General: He is not in acute distress.     Appearance: Normal appearance. He is not ill-appearing, toxic-appearing or diaphoretic.   HENT:      Head: Normocephalic and atraumatic.      Mouth/Throat:      Mouth: Mucous membranes are moist.      Pharynx: No oropharyngeal exudate.   Eyes:      Extraocular Movements: Extraocular movements intact.      Pupils: Pupils are equal, round, and reactive to light.   Cardiovascular:      Rate and Rhythm: Normal rate and regular rhythm.      Heart sounds: No murmur heard.  Pulmonary:      Effort: Pulmonary effort is normal. No respiratory distress.      Breath sounds: Normal breath sounds. No wheezing.   Abdominal:      General: Abdomen is flat. Bowel sounds are normal. There is no distension.      Tenderness: There is no abdominal tenderness. There is no guarding or rebound.   Musculoskeletal:      Comments: Redness and erythema over the left hand dorsal aspect, wound over the dorsal aspect, draining minimal yellowish discharge  Swelling " over the left hand decreased   Skin:     Findings: No lesion or rash.   Neurological:      General: No focal deficit present.      Mental Status: He is alert and oriented to person, place, and time.      Cranial Nerves: No cranial nerve deficit.      Motor: No weakness.   Psychiatric:         Mood and Affect: Mood normal.         Behavior: Behavior normal.         Current medication   Current Facility-Administered Medications   Medication Dose Route Frequency Provider Last Rate Last Admin    abacavir-dolutegravir-lamivud (Triumeq) 600- mg per tablet 1 tablet  1 tablet oral Daily Antwan CESAR Chen, APRN-CNP   1 tablet at 12/02/23 0855    acetaminophen (Tylenol) tablet 650 mg  650 mg oral q4h PRN Antwan L April, APRN-CNP   650 mg at 11/30/23 1358    Or    acetaminophen (Tylenol) oral liquid 650 mg  650 mg oral q4h PRN Antwan L April, APRN-CNP        Or    acetaminophen (Tylenol) suppository 650 mg  650 mg rectal q4h PRN Antwan Chen, APRN-CNP        docusate sodium (Colace) capsule 100 mg  100 mg oral BID Antwan Chen, APRN-CNP   100 mg at 12/02/23 0855    escitalopram (Lexapro) tablet 10 mg  10 mg oral Daily Antwan L April, APRN-CNP   10 mg at 12/02/23 0855    HYDROmorphone (Dilaudid) injection 0.5 mg  0.5 mg intravenous q4h PRN Antwan L April, APRN-CNP   0.5 mg at 12/02/23 1400    lactated Ringer's infusion  50 mL/hr intravenous Continuous Antwan Chen, APRN-CNP   Stopped at 11/30/23 2340    lisdexamfetamine (Vyvanse) capsule 70 mg  70 mg oral Daily Angeline Baltazar APRN-CNP        melatonin tablet 3 mg  3 mg oral Daily Antwan Chen, APRN-CNP   3 mg at 12/01/23 1822    meropenem (Merrem) in sodium chloride 0.9 % 100 mL IV 1 g  1 g intravenous q8h Antwan hCen, APRN- mL/hr at 12/02/23 1625 1 g at 12/02/23 1625    ondansetron (Zofran) tablet 4 mg  4 mg oral q8h PRN JAM Sanchez        Or    ondansetron (Zofran) injection 4 mg  4 mg intravenous q8h PRN  Antwan Chen APRN-CNP   4 mg at 11/30/23 1227    oxyCODONE (Roxicodone) immediate release tablet 10 mg  10 mg oral q4h PRN Bertin Flanagan, DO   10 mg at 12/01/23 1540    oxyCODONE (Roxicodone) immediate release tablet 5 mg  5 mg oral q4h PRN Bertin Flanagan, DO        oxyCODONE (Roxicodone) immediate release tablet 5 mg  5 mg oral q4h PRN Bertin Flanagan,         oxyCODONE-acetaminophen (Percocet) 5-325 mg per tablet 1 tablet  1 tablet oral q6h PRN Antwan Chen APRN-CNP   1 tablet at 12/02/23 1455    oxyCODONE-acetaminophen (Percocet) 5-325 mg per tablet 1 tablet  1 tablet oral q4h PRN Antwan Chen, APRN-CNP        promethazine (Phenergan) 6.25 mg in sodium chloride 0.9% 50 mL IV  6.25 mg intravenous Once PRN Bertin Flanagan,             Labs  Lab Results   Component Value Date    WBC 6.6 12/02/2023    HGB 11.3 (L) 12/02/2023    HCT 33.9 (L) 12/02/2023    MCV 93 12/02/2023     12/02/2023     Lab Results   Component Value Date    HGBA1C 5.4 08/27/2018     Lab Results   Component Value Date    GLUCOSE 96 12/02/2023    CALCIUM 8.3 (L) 12/02/2023     12/02/2023    K 4.1 12/02/2023    CO2 29 12/02/2023     (H) 12/02/2023    BUN 21 12/02/2023    CREATININE 1.12 12/02/2023           Principal Problem:    Failure of outpatient treatment  Active Problems:    Abscess of left hand      Assessment and Plan   #Left hand abscess  #Cellulitis of the left hand  #Fever  #History of MRSA infection     Continue Merrem  Patient had incision and drainage of an abscess  Wound culture growing Staph aureus  Final wound culture pending  Patient developed a reaction after vancomycin in the ED and was discontinued  Continue local wound care  ID on board, will follow-up with ID regarding alternative antibiotics, patient has history of MRSA infection in the past  Plastic surgery and orthopedics on board  Pain management  Bowel regimen     #HIV on ART retroviral  Continue current antiretroviral regimen         #History of anxiety  Continue home meds     DVT prophylaxis ambulate  Low risk    12/2/2023  Patient was seen and examined at bedside  Continue local wound care for the left hand wound  ID is on board  Culture growing Staph aureus, pending final culture and sensitivity  On meropenem  Patient developed reaction to vancomycin was discontinued  Disposition possibly home with home care and midline when cultures are finalized and final antibiotic as per ID     LOS: 3 days

## 2023-12-03 VITALS
WEIGHT: 181 LBS | RESPIRATION RATE: 16 BRPM | SYSTOLIC BLOOD PRESSURE: 122 MMHG | HEART RATE: 55 BPM | OXYGEN SATURATION: 96 % | DIASTOLIC BLOOD PRESSURE: 77 MMHG | BODY MASS INDEX: 23.99 KG/M2 | TEMPERATURE: 99 F | HEIGHT: 73 IN

## 2023-12-03 LAB
BACTERIA SPEC CULT: ABNORMAL
BACTERIA SPEC CULT: ABNORMAL
CULTURE WOUND: ABNORMAL
CULTURE WOUND: ABNORMAL
ERYTHROCYTE [DISTWIDTH] IN BLOOD BY AUTOMATED COUNT: 12.9 % (ref 11.5–14.5)
GRAM STN SPEC: ABNORMAL
GRAM STN SPEC: ABNORMAL
HCT VFR BLD AUTO: 38.4 % (ref 41–52)
HGB BLD-MCNC: 12.7 G/DL (ref 13.5–17.5)
HOLD SPECIMEN: NORMAL
HOLD SPECIMEN: NORMAL
MCH RBC QN AUTO: 31.1 PG (ref 26–34)
MCHC RBC AUTO-ENTMCNC: 33.1 G/DL (ref 32–36)
MCV RBC AUTO: 94 FL (ref 80–100)
NRBC BLD-RTO: 0 /100 WBCS (ref 0–0)
ORGANISM: ABNORMAL
PLATELET # BLD AUTO: 226 X10*3/UL (ref 150–450)
RBC # BLD AUTO: 4.09 X10*6/UL (ref 4.5–5.9)
WBC # BLD AUTO: 5.5 X10*3/UL (ref 4.4–11.3)

## 2023-12-03 PROCEDURE — 2500000001 HC RX 250 WO HCPCS SELF ADMINISTERED DRUGS (ALT 637 FOR MEDICARE OP): Performed by: NURSE PRACTITIONER

## 2023-12-03 PROCEDURE — 85027 COMPLETE CBC AUTOMATED: CPT | Performed by: STUDENT IN AN ORGANIZED HEALTH CARE EDUCATION/TRAINING PROGRAM

## 2023-12-03 PROCEDURE — 99239 HOSP IP/OBS DSCHRG MGMT >30: CPT | Performed by: STUDENT IN AN ORGANIZED HEALTH CARE EDUCATION/TRAINING PROGRAM

## 2023-12-03 PROCEDURE — 36415 COLL VENOUS BLD VENIPUNCTURE: CPT | Performed by: STUDENT IN AN ORGANIZED HEALTH CARE EDUCATION/TRAINING PROGRAM

## 2023-12-03 PROCEDURE — 2500000004 HC RX 250 GENERAL PHARMACY W/ HCPCS (ALT 636 FOR OP/ED): Performed by: NURSE PRACTITIONER

## 2023-12-03 PROCEDURE — 2500000004 HC RX 250 GENERAL PHARMACY W/ HCPCS (ALT 636 FOR OP/ED): Performed by: INTERNAL MEDICINE

## 2023-12-03 PROCEDURE — 2500000001 HC RX 250 WO HCPCS SELF ADMINISTERED DRUGS (ALT 637 FOR MEDICARE OP): Performed by: STUDENT IN AN ORGANIZED HEALTH CARE EDUCATION/TRAINING PROGRAM

## 2023-12-03 RX ORDER — SULFAMETHOXAZOLE AND TRIMETHOPRIM 800; 160 MG/1; MG/1
1 TABLET ORAL 2 TIMES DAILY
Qty: 28 TABLET | Refills: 0 | Status: SHIPPED | OUTPATIENT
Start: 2023-12-03 | End: 2023-12-17

## 2023-12-03 RX ADMIN — DAPTOMYCIN 500 MG: 500 INJECTION, POWDER, LYOPHILIZED, FOR SOLUTION INTRAVENOUS at 18:02

## 2023-12-03 RX ADMIN — OXYCODONE HYDROCHLORIDE 10 MG: 5 TABLET ORAL at 02:04

## 2023-12-03 RX ADMIN — ESCITALOPRAM OXALATE 10 MG: 10 TABLET, FILM COATED ORAL at 08:51

## 2023-12-03 RX ADMIN — ABACAVIR SULFATE, DOLUTEGRAVIR SODIUM, LAMIVUDINE 1 TABLET: 600; 50; 300 TABLET, FILM COATED ORAL at 08:50

## 2023-12-03 RX ADMIN — DOCUSATE SODIUM 100 MG: 100 CAPSULE, LIQUID FILLED ORAL at 08:50

## 2023-12-03 RX ADMIN — OXYCODONE HYDROCHLORIDE 10 MG: 5 TABLET ORAL at 08:51

## 2023-12-03 RX ADMIN — OXYCODONE HYDROCHLORIDE 10 MG: 5 TABLET ORAL at 18:07

## 2023-12-03 RX ADMIN — HYDROMORPHONE HYDROCHLORIDE 0.5 MG: 1 INJECTION, SOLUTION INTRAMUSCULAR; INTRAVENOUS; SUBCUTANEOUS at 10:48

## 2023-12-03 RX ADMIN — HYDROMORPHONE HYDROCHLORIDE 0.5 MG: 1 INJECTION, SOLUTION INTRAMUSCULAR; INTRAVENOUS; SUBCUTANEOUS at 04:47

## 2023-12-03 ASSESSMENT — COGNITIVE AND FUNCTIONAL STATUS - GENERAL
DAILY ACTIVITIY SCORE: 24
MOBILITY SCORE: 24

## 2023-12-03 ASSESSMENT — PAIN SCALES - GENERAL
PAINLEVEL_OUTOF10: 8
PAINLEVEL_OUTOF10: 8
PAINLEVEL_OUTOF10: 7
PAINLEVEL_OUTOF10: 4
PAINLEVEL_OUTOF10: 7
PAINLEVEL_OUTOF10: 8
PAINLEVEL_OUTOF10: 7

## 2023-12-03 NOTE — DISCHARGE SUMMARY
Discharge Diagnosis  1. Failure of outpatient treatment        2. Abscess of left hand  Case Request Operating Room: Debridement Hand    Case Request Operating Room: Debridement Hand    Tissue/Wound Culture/Smear    Tissue/Wound Culture/Smear      3. Cellulitis of left arm  sulfamethoxazole-trimethoprim (Bactrim DS) 800-160 mg tablet      4. Fever in adult              Issues Requiring Follow-Up  CBC, BMP, CRP weekly     Discharge Meds     Your medication list        START taking these medications        Instructions Last Dose Given Next Dose Due   sulfamethoxazole-trimethoprim 800-160 mg tablet  Commonly known as: Bactrim DS      Take 1 tablet by mouth 2 times a day for 14 days.              CONTINUE taking these medications        Instructions Last Dose Given Next Dose Due   Antiseptic Skin Clnsr(chlorhe) 4 % external liquid  Generic drug: chlorhexidine           escitalopram 10 mg tablet  Commonly known as: Lexapro           HYDROcodone-acetaminophen 5-325 mg tablet  Commonly known as: Norco           lisdexamfetamine 70 mg capsule  Commonly known as: Vyvanse      Take 1 capsule (70 mg) by mouth once daily in the morning. Do not start before November 15, 2023.       lisdexamfetamine 70 mg capsule  Commonly known as: Vyvanse  Start taking on: December 15, 2023      Take 1 capsule (70 mg) by mouth once daily. Do not start before December 15, 2023.       lisdexamfetamine 70 mg capsule  Commonly known as: Vyvanse  Start taking on: January 15, 2024      Take 1 capsule (70 mg) by mouth once daily in the morning. Do not start before January 15, 2024.       mupirocin 2 % ointment  Commonly known as: Bactroban           SUMAtriptan 100 mg tablet  Commonly known as: Imitrex      Take 1 tablet (100 mg) by mouth 1 time if needed for migraine.       Triumeq 600- mg tablet  Generic drug: abacavir-dolutegravir-lamivud                  STOP taking these medications      ibuprofen 800 mg tablet                  Where to Get  Your Medications        These medications were sent to [id number] - [place], [place] - [address]  [address] [place] [address]      Phone: [phone]   sulfamethoxazole-trimethoprim 800-160 mg tablet         Test Results Pending At Discharge  Pending Labs       No current pending labs.            Hospital Course     Mathieu Kelly is a 38 y.o. male with past medical history significant for ADHD, anxiety, asthma and HIV currently on ARVT, history of MRSA infection who presents to [facility] emergency department chief complaint of left hand pain and swelling.  Patient was placed on Bactrim, but started to have purulent drainage, also had fever and chills.  Labs showed CRP of 7.79, WBC 9.9, x-ray of the hand showed moderate diffuse soft tissue swelling of the left hand worse dorsally overlying metatarsals no evidence of soft tissue gas or osseous involvement.  Was treated with Merrem and Vanco, patient developed diffuse rash to vancomycin and was switched to daptomycin.  Patient had incision and drainage of left hand abscess.  Was on local wound care.  Wound culture grew MRSA.  Discussed with ID, ID recommended to switch to p.o. Bactrim for 14 days.  Patient to follow-up with ID upon discharge.  Also to follow-up with wound care center.  Patient is stable for discharge today after receiving a dose of daptomycin here.  Patient has no fever or chills.  Discharge plan was discussed with the patient and he is in agreement.  Total discharge time spent 35 minutes.      Pertinent Physical Exam At Time of Discharge  Physical Exam  Constitutional:       General: He is not in acute distress.     Appearance: Normal appearance. He is not ill-appearing, toxic-appearing or diaphoretic.   HENT:      Head: Normocephalic and atraumatic.      Mouth/Throat:      Mouth: Mucous membranes are moist.      Pharynx: No oropharyngeal exudate.   Eyes:      Extraocular Movements: Extraocular movements intact.       Pupils: Pupils are equal, round, and reactive to light.   Cardiovascular:      Rate and Rhythm: Normal rate and regular rhythm.      Heart sounds: No murmur heard.  Pulmonary:      Effort: Pulmonary effort is normal. No respiratory distress.      Breath sounds: Normal breath sounds. No wheezing.   Abdominal:      General: Abdomen is flat. Bowel sounds are normal. There is no distension.      Tenderness: There is no abdominal tenderness. There is no guarding or rebound.   Musculoskeletal:         General: Swelling present.      Comments: Seen swelling, wound over the dorsal aspect of the left hand, minimal purulent drainage, redness around the wound area,   Skin:     Findings: No lesion or rash.   Neurological:      General: No focal deficit present.      Mental Status: He is alert and oriented to person, place, and time.      Cranial Nerves: No cranial nerve deficit.      Motor: No weakness.   Psychiatric:         Mood and Affect: Mood normal.         Behavior: Behavior normal.         Outpatient Follow-Up  Future Appointments   Date Time Provider Department Center   2/13/2024 10:30 AM Ron Ferraro MD [id number] [place]   [place] [facility]  [address] [facility] [facility]  [facility] [id number] [phone]    Follow up in 1 week(s)      Gasper Long MD  [address] [facility]  [address]  [facility] [id number] [phone]    Follow up in 1 week(s)      [facility] [place] [address] [facility]  Virtual Department  [facility] [address]  Follow up in 2 day(s)            Tati Mcmullen MD

## 2023-12-03 NOTE — PROGRESS NOTES
Orthopedics Progress Note  Patient: Mathieu Kelly  Unit/Bed: 618/618-A  YOB: 1985  MRN: [id number]  Acct: [id number]   Admitting Diagnosis: Abscess of left hand [L02.512]  Cellulitis of left arm [L03.114]  Fever in adult [R50.9]  Failure of outpatient treatment [Z78.9]  Date:  11/29/2023  Hospital Day: 4  Attending: Tati Mcmullen MD     Chief Complaint   Patient presents with    Wound Infection        SUBJECTIVE    Patient seen and examined this morning. No acute events overnight.      VITALS      Vitals:    12/02/23 1408 12/02/23 2023 12/03/23 0449 12/03/23 0739   BP: 125/77 152/85 130/75 122/77   BP Location:  Right arm     Patient Position:  Sitting     Pulse: 61 59 57 55   Resp: 16 16 18 16   Temp: 36.8 °C (98.2 °F) 36.5 °C (97.7 °F) 36.8 °C (98.2 °F) 37.2 °C (99 °F)   TempSrc:  Temporal     SpO2: 96% 96% 95% 96%   Weight:       Height:           Intake/Output Summary (Last 24 hours) at 12/3/2023 0757  Last data filed at 12/2/2023 2046  Gross per 24 hour   Intake 160 ml   Output --   Net 160 ml        Wt Readings from Last 4 Encounters:   11/29/23 82.1 kg (181 lb)   10/31/23 83 kg (183 lb)   05/15/23 82.1 kg (181 lb)   02/14/23 80.9 kg (178 lb 6 oz)        Allergies:  Allergies   Allergen Reactions    Tramadol Nausea/vomiting     Does not remember what the reaction was to tramadol    Codeine Unknown and Itching    Ketorolac Unknown, Dizziness, Nausea Only and Rash    Vancomycin Rash     Red man syndrome        PHYSICAL EXAM   Physical Exam  HENT:      Mouth/Throat:      Mouth: Mucous membranes are dry.      Pharynx: Oropharynx is clear.   Cardiovascular:      Rate and Rhythm: Normal rate.      Pulses: Normal pulses.      Heart sounds: S1 normal and S2 normal.   Pulmonary:      Effort: Pulmonary effort is normal.   Musculoskeletal:         General: No swelling. Normal range of motion.      Left hand: Swelling and tenderness present.      Cervical back: Normal range of motion.       Comments: Capillary refill <2, radial pulse palpable, sensations are intact.    Skin:     General: Skin is warm and dry.      Capillary Refill: Capillary refill takes less than 2 seconds.   Neurological:      General: No focal deficit present.      Mental Status: He is alert and oriented to person, place, and time.   Psychiatric:         Attention and Perception: Attention normal.         Mood and Affect: Mood normal.         Speech: Speech normal.         Behavior: Behavior normal. Behavior is cooperative.         LABS   CBC:   Results from last 7 days   Lab Units 12/03/23  0706 12/02/23 0549 11/30/23 0549   WBC AUTO x10*3/uL 5.5 6.6 6.2   RBC AUTO x10*6/uL 4.09* 3.65* 4.04*   HEMOGLOBIN g/dL 12.7* 11.3* 12.5*   HEMATOCRIT % 38.4* 33.9* 37.3*   MCV fL 94 93 92   MCH pg 31.1 31.0 30.9   MCHC g/dL 33.1 33.3 33.5   RDW % 12.9 13.3 13.3   PLATELETS AUTO x10*3/uL 226 192 163       CMP:    Results from last 7 days   Lab Units 12/02/23  0549 11/30/23 0550 11/29/23 2020   SODIUM mmol/L 140 134* 134*   POTASSIUM mmol/L 4.1 3.5 3.6   CHLORIDE mmol/L 108* 105 103   CO2 mmol/L 29 24 23   BUN mg/dL 21 13 15   CREATININE mg/dL 1.12 1.23 1.27   GLUCOSE mg/dL 96 127* 112*   PROTEIN TOTAL g/dL  --   --  6.8   CALCIUM mg/dL 8.3* 8.2* 8.9   BILIRUBIN TOTAL mg/dL  --   --  0.9   ALK PHOS U/L  --   --  46   AST U/L  --   --  22   ALT U/L  --   --  18       BMP:    Results from last 7 days   Lab Units 12/02/23 0549 11/30/23 0550 11/29/23 2020   SODIUM mmol/L 140 134* 134*   POTASSIUM mmol/L 4.1 3.5 3.6   CHLORIDE mmol/L 108* 105 103   CO2 mmol/L 29 24 23   BUN mg/dL 21 13 15   CREATININE mg/dL 1.12 1.23 1.27   CALCIUM mg/dL 8.3* 8.2* 8.9   GLUCOSE mg/dL 96 127* 112*         abacavir-dolutegravir-lamivud, 1 tablet, oral, Daily  daptomycin, 6 mg/kg, intravenous, q24h SANDRO  docusate sodium, 100 mg, oral, BID  escitalopram, 10 mg, oral, Daily  lisdexamfetamine, 70 mg, oral, Daily  melatonin, 3 mg, oral, Daily       lactated Ringer's,  50 mL/hr, Last Rate: Stopped (11/30/23 2340)       Current Outpatient Medications   Medication Instructions    abacavir-dolutegravir-lamivud (Triumeq) 600- mg tablet 1 tablet, oral, Daily    Antiseptic Skin Clnsr,chlorhe, 4 % external liquid APPLY TO AFFECTED AREA EVERY DAY AS NEEDED    escitalopram (LEXAPRO) 10 mg, oral, Daily    HYDROcodone-acetaminophen (Norco) 5-325 mg tablet 1 tablet, oral, Every 6 hours PRN    ibuprofen 800 mg tablet oral    lisdexamfetamine (VYVANSE) 70 mg, oral, Every morning    [START ON 12/15/2023] lisdexamfetamine (VYVANSE) 70 mg, oral, Daily RT    [START ON 1/15/2024] lisdexamfetamine (VYVANSE) 70 mg, oral, Every morning    mupirocin (Bactroban) 2 % ointment APPLY TO AFFECTED AREA 3 TIMES A DAY    SUMAtriptan (IMITREX) 100 mg, oral, Once as needed        XR hand left 3+ views    Result Date: 11/29/2023  Interpreted By:  Yennifer Aguirre, STUDY: XR HAND LEFT 3+ VIEWS; ;  11/29/2023 8:44 pm   INDICATION: Signs/Symptoms:wound.   COMPARISON: None.   ACCESSION NUMBER(S): [id number]   ORDERING CLINICIAN: LING GALEANO   FINDINGS: Soft tissue swelling and probable ulceration overlying the dorsal aspect of the metatarsals. No radiopaque foreign body is seen. No soft tissue gas. No osseous erosions. Ossific density adjacent to the base of the proximal phalanx, middle finger, laterally suggestive of an avulsion fracture of uncertain age.       1. Moderate diffuse soft tissue swelling of the left hand worse dorsally overlying the metatarsals. No evidence for soft tissue gas or radiopaque foreign body is seen radiographically. No osseous erosions to suggest osteomyelitis.   2. Subtle avulsion fracture involving the base of the proximal phalanx, middle finger as above. Correlation with prior history of traumatic injury recommended.     MACRO: None   Signed by: Yennifer Aguirre 11/29/2023 9:15 PM Dictation workstation:   [id number]      Assessment     Patient Active Problem List   Diagnosis    ADHD  (attention deficit hyperactivity disorder), combined type    Anemia    Anxiety    Asthma    COPD (chronic obstructive pulmonary disease) (CMS/[facility])    Dysphagia    Encephalopathy    HIV-1 (human immunodeficiency virus I) (CMS/[facility])    Ischemic heart disease    Laceration of finger    Lung nodule    Vertigo    Vitamin D deficiency    Failure of outpatient treatment    Abscess of left hand      Subjective  Patient resting comfortably in bed. He reports expected post operative pain. On examination, the dressing to his left hand is clean, dry, and intact. His sensations to his right hand are intact and his radial pulse is palpable. Awaiting final tissue cultures to determine antibiotics.     Impression  POD 3 left hand I&D    Plan:  Pain control  DVT prophylaxis with SCDs and ambulation  Non weight bearing left hand  Appreciate recommendations from infectious disease  Discharge planning pending final cultures  Continue care per primary team       Electronically signed by JAM Daniels on 12/3/2023 at 7:57 AM

## 2023-12-03 NOTE — NURSING NOTE
Follow up appointments noted.  Discussed medication administration including oral antibiotic.  Reviewed incision care and dressing supplies given.

## 2023-12-03 NOTE — PROGRESS NOTES
Pt may need iv atb post discharge. Pending cultures of hand abscess.  Sent to  infusion to run benefits on IV infusion Daptomycin 500mg q 24hrs.  If iv atb indicated per culture pt will get line Monday peer dr camacho.  Elizabeth hinton will follow up Monday with [facility].  [facility] informed to notify figueroa Hinton.

## 2023-12-04 LAB
BACTERIA BLD CULT ORG #2: NORMAL
BACTERIA BLD CULT: NORMAL
CULTURE WOUND: ABNORMAL
CULTURE WOUND: ABNORMAL
ORGANISM: ABNORMAL

## 2023-12-05 PROBLEM — R13.10 DYSPHAGIA: Status: ACTIVE | Noted: 2023-10-30

## 2023-12-05 PROBLEM — L02.512 ABSCESS OF LEFT HAND: Status: ACTIVE | Noted: 2023-11-29

## 2023-12-05 PROBLEM — L02.416 ABSCESS OF LEFT LOWER EXTREMITY: Status: ACTIVE | Noted: 2022-12-21

## 2023-12-05 PROBLEM — R42 VERTIGO: Status: ACTIVE | Noted: 2023-10-30

## 2023-12-05 PROBLEM — J44.9 CHRONIC OBSTRUCTIVE PULMONARY DISEASE (HCC): Status: ACTIVE | Noted: 2018-10-26

## 2023-12-05 PROBLEM — S61.219A LACERATION OF FINGER: Status: ACTIVE | Noted: 2023-10-30

## 2023-12-05 RX ORDER — HYDROCODONE BITARTRATE AND ACETAMINOPHEN 5; 325 MG/1; MG/1
1 TABLET ORAL EVERY 6 HOURS PRN
COMMUNITY
Start: 2022-12-22

## 2023-12-05 RX ORDER — LANOLIN ALCOHOL/MO/W.PET/CERES
3 CREAM (GRAM) TOPICAL NIGHTLY PRN
COMMUNITY
Start: 2023-11-29

## 2023-12-05 RX ORDER — ESCITALOPRAM OXALATE 10 MG/1
10 TABLET ORAL DAILY
COMMUNITY
Start: 2023-05-15

## 2023-12-05 NOTE — TELEPHONE ENCOUNTER
Spoke with Tasha from [address] [facility] wound center. Reviewed case. Apt scheduled 12.7.23 at 130 at   [address] [facility]. Call placed to pt. Reviewed apt date time and location. Pt denies issue with this apt. He was able to reach back location and denied concern. Requested he call office if needed. Pt is also scheduled a 1 week follow up with Dr Rupert Juarez next week. Pt verbalized apt date and time.

## 2023-12-05 NOTE — TELEPHONE ENCOUNTER
Called in with update  Dc from [facility] Sunday  Oral bactrim  States feels better but there is bleeding, redness and swelling but overall better. Pt contiunes to worry about worsening infection. Has wound center follow up today [facility]  Dressing changes daily  New allergy- vanco- rash, itchy and swelling. Red man syndrome. Will update office tomorrow as well.

## 2023-12-05 NOTE — TELEPHONE ENCOUNTER
Pt having difficulties with scheduling wound follow up  Per dc pt was to follow up in 2 days. This RN assisted pt. Call placed to [facility] wound center [phone]  No available apt until Friday. Stated there is also an office in [address]. This is closer to pt. MAGALI left at [facility] wound center for call back to help schedule pt. Pt updated. He will also contiune looking into this and scheduling.

## 2023-12-07 ENCOUNTER — OFFICE VISIT (OUTPATIENT)
Dept: WOUND CARE | Facility: CLINIC | Age: 38
End: 2023-12-07
Payer: COMMERCIAL

## 2023-12-07 PROCEDURE — 99214 OFFICE O/P EST MOD 30 MIN: CPT | Mod: 25

## 2023-12-07 PROCEDURE — 99213 OFFICE O/P EST LOW 20 MIN: CPT | Performed by: PLASTIC SURGERY

## 2023-12-12 ENCOUNTER — OFFICE VISIT (OUTPATIENT)
Dept: INFECTIOUS DISEASES | Age: 38
End: 2023-12-12
Payer: COMMERCIAL

## 2023-12-12 VITALS
BODY MASS INDEX: 22.37 KG/M2 | DIASTOLIC BLOOD PRESSURE: 89 MMHG | TEMPERATURE: 97.6 F | SYSTOLIC BLOOD PRESSURE: 125 MMHG | HEART RATE: 110 BPM | WEIGHT: 174.2 LBS

## 2023-12-12 DIAGNOSIS — B20 HUMAN IMMUNODEFICIENCY VIRUS (HCC): Primary | ICD-10-CM

## 2023-12-12 DIAGNOSIS — L02.519 HAND ABSCESS: ICD-10-CM

## 2023-12-12 DIAGNOSIS — Z22.322 MRSA (METHICILLIN RESISTANT STAPH AUREUS) CULTURE POSITIVE: ICD-10-CM

## 2023-12-12 PROCEDURE — 99214 OFFICE O/P EST MOD 30 MIN: CPT | Performed by: INTERNAL MEDICINE

## 2023-12-12 PROCEDURE — 90674 CCIIV4 VAC NO PRSV 0.5 ML IM: CPT | Performed by: INTERNAL MEDICINE

## 2023-12-12 PROCEDURE — 90471 IMMUNIZATION ADMIN: CPT | Performed by: INTERNAL MEDICINE

## 2023-12-12 RX ORDER — DOXYCYCLINE HYCLATE 100 MG
100 TABLET ORAL 2 TIMES DAILY
Qty: 60 TABLET | Refills: 0 | Status: SHIPPED | OUTPATIENT
Start: 2023-12-12 | End: 2024-01-11

## 2023-12-12 ASSESSMENT — PATIENT HEALTH QUESTIONNAIRE - PHQ9
1. LITTLE INTEREST OR PLEASURE IN DOING THINGS: 0
SUM OF ALL RESPONSES TO PHQ QUESTIONS 1-9: 0
SUM OF ALL RESPONSES TO PHQ9 QUESTIONS 1 & 2: 0
SUM OF ALL RESPONSES TO PHQ QUESTIONS 1-9: 0
2. FEELING DOWN, DEPRESSED OR HOPELESS: 0

## 2023-12-12 NOTE — PROGRESS NOTES
Hand wound follow up      Compliant with medications --- has been being better with compliance. All supplentments dicussed and educatioon providied pt keeps all sepatre from ART     Current partner -- long term no issues       Flu vaccine discussed. today     Pneumonia vacc? -- completed- yes   Needs updated- yes  encouraged vaccine and education provided. Hep B vaccine/screening -- vac and screened       Smoking?  < 0.5 PPD  trying to quit  Discussed importance of quiting     Etoh? no    Drugs? no      Transportation: no issues     PCP- yes     Working- full time      Housing-- no issues      Dental-- Encouraged -- needs follow up. Seen by [facility] [address] -- denies needs or concerns    Support group? ?-- not interested    HIV consoling as needed: completed  Risk  [facility]  Drugs/Etoh  Hospital or out pt center for care. U=U was discussed at today's appointment. Pt  verbalizes understanding of U=U. Reviewed Vaccine Information Sheet, \"Influenza - Inactivated\"  with Pricila Rakesh,  and verbalized understanding. Patient responses:    Have you ever had a reaction to a flu vaccine? No  Are you able to eat eggs without adverse effects? Yes  Do you have any current illness? No  Have you ever had Guillian [place] Syndrome? No    Flu vaccine given per order. Please see immunization tab. After obtaining consent, and per orders of Dr. Jersey Young, injection of  0.5  m.l   FLU VACCINE  Was administered to right deltoid. Manf:  Rolly Hermosillo #: [id number] EXP: June 30, 2024  [address]: [id number]    Patient tolerated well. Denies pain at site. Patient instructed to remain in clinic for 5-10 minutes afterwards, and to report any adverse reaction to me immediately. Will call office if needed.      PPE  vouchers

## 2023-12-14 ENCOUNTER — OFFICE VISIT (OUTPATIENT)
Dept: WOUND CARE | Facility: CLINIC | Age: 38
End: 2023-12-14
Payer: COMMERCIAL

## 2023-12-14 PROCEDURE — 11042 DBRDMT SUBQ TIS 1ST 20SQCM/<: CPT

## 2023-12-14 PROCEDURE — 11042 DBRDMT SUBQ TIS 1ST 20SQCM/<: CPT | Performed by: PLASTIC SURGERY

## 2023-12-21 ENCOUNTER — OFFICE VISIT (OUTPATIENT)
Dept: WOUND CARE | Facility: CLINIC | Age: 38
End: 2023-12-21
Payer: COMMERCIAL

## 2023-12-21 PROCEDURE — 11042 DBRDMT SUBQ TIS 1ST 20SQCM/<: CPT

## 2023-12-21 PROCEDURE — 11042 DBRDMT SUBQ TIS 1ST 20SQCM/<: CPT | Performed by: PLASTIC SURGERY

## 2023-12-23 DIAGNOSIS — B20 HUMAN IMMUNODEFICIENCY VIRUS (HCC): ICD-10-CM

## 2023-12-23 LAB
ALBUMIN SERPL-MCNC: 4.2 G/DL (ref 3.5–4.6)
ALP SERPL-CCNC: 56 U/L (ref 35–104)
ALT SERPL-CCNC: 13 U/L (ref 0–41)
ANION GAP SERPL CALCULATED.3IONS-SCNC: 10 MEQ/L (ref 9–15)
AST SERPL-CCNC: 14 U/L (ref 0–40)
BILIRUB SERPL-MCNC: <0.2 MG/DL (ref 0.2–0.7)
BUN SERPL-MCNC: 18 MG/DL (ref 6–20)
CALCIUM SERPL-MCNC: 9.1 MG/DL (ref 8.5–9.9)
CHLORIDE SERPL-SCNC: 103 MEQ/L (ref 95–107)
CO2 SERPL-SCNC: 25 MEQ/L (ref 20–31)
CREAT SERPL-MCNC: 1.24 MG/DL (ref 0.7–1.2)
ERYTHROCYTE [DISTWIDTH] IN BLOOD BY AUTOMATED COUNT: 13.8 % (ref 11.5–14.5)
GLOBULIN SER CALC-MCNC: 2.5 G/DL (ref 2.3–3.5)
GLUCOSE SERPL-MCNC: 106 MG/DL (ref 70–99)
HCT VFR BLD AUTO: 42 % (ref 42–52)
HGB BLD-MCNC: 14 G/DL (ref 14–18)
MCH RBC QN AUTO: 30.2 PG (ref 27–31.3)
MCHC RBC AUTO-ENTMCNC: 33.3 % (ref 33–37)
MCV RBC AUTO: 90.7 FL (ref 79–92.2)
PLATELET # BLD AUTO: 265 K/UL (ref 130–400)
POTASSIUM SERPL-SCNC: 4.2 MEQ/L (ref 3.4–4.9)
PROT SERPL-MCNC: 6.7 G/DL (ref 6.3–8)
RBC # BLD AUTO: 4.63 M/UL (ref 4.7–6.1)
SODIUM SERPL-SCNC: 138 MEQ/L (ref 135–144)
WBC # BLD AUTO: 6.2 K/UL (ref 4.8–10.8)

## 2023-12-25 LAB
CD3+CD4+ CELLS # BLD: 629 CELLS/UL (ref 430–1800)
CD4 % HELPER T CELL: 23 % (ref 32–64)
IMMUNODEFICIENCY MARKERS SPEC-IMP: ABNORMAL

## 2023-12-27 LAB
HIV-1 RNA BY PCR, QN: NOT DETECTED
SOURCE: NORMAL

## 2023-12-28 ENCOUNTER — OFFICE VISIT (OUTPATIENT)
Dept: WOUND CARE | Facility: CLINIC | Age: 38
End: 2023-12-28
Payer: COMMERCIAL

## 2023-12-28 PROCEDURE — 11042 DBRDMT SUBQ TIS 1ST 20SQCM/<: CPT

## 2024-01-04 ENCOUNTER — OFFICE VISIT (OUTPATIENT)
Dept: WOUND CARE | Facility: CLINIC | Age: 39
End: 2024-01-04
Payer: COMMERCIAL

## 2024-01-04 PROCEDURE — 99212 OFFICE O/P EST SF 10 MIN: CPT | Mod: 25

## 2024-01-04 PROCEDURE — 99212 OFFICE O/P EST SF 10 MIN: CPT | Performed by: PLASTIC SURGERY

## 2024-01-05 ENCOUNTER — TELEMEDICINE (OUTPATIENT)
Dept: INFECTIOUS DISEASES | Age: 39
End: 2024-01-05
Payer: COMMERCIAL

## 2024-01-05 DIAGNOSIS — Z22.322 MRSA (METHICILLIN RESISTANT STAPH AUREUS) CULTURE POSITIVE: Primary | ICD-10-CM

## 2024-01-05 DIAGNOSIS — L02.519 HAND ABSCESS: ICD-10-CM

## 2024-01-05 DIAGNOSIS — B20 HUMAN IMMUNODEFICIENCY VIRUS (HCC): ICD-10-CM

## 2024-01-05 DIAGNOSIS — K02.9 DENTAL CARIES: ICD-10-CM

## 2024-01-05 PROCEDURE — 99214 OFFICE O/P EST MOD 30 MIN: CPT | Performed by: INTERNAL MEDICINE

## 2024-01-05 NOTE — PROGRESS NOTES
Adolfo Fulton, was evaluated through a synchronous (real-time) audio-video encounter. The patient (or guardian if applicable) is aware that this is a billable service, which includes applicable co-pays. This Virtual Visit was conducted with patient's (and/or legal guardian's) consent. Patient identification was verified, and a caregiver was present when appropriate.   The patient was located at home  Provider was located at the office    --Stacey Castro DO    An electronic signature was used to authenticate this note.      1/5/2024    Patient Name: Adolfo Fulton  YOB: 1985  Patient Sex: male  Medical Record Number: [id number]    Background:  Patient with HIV + MSM with a longterm HIV+ partner since 2006.    Presented to my office with mario CD4 <50, 8%, viral load is 160,000. G6PD negative, RPR negative, HLA negative, serum quantiferon negative, chlam/gonorrhea negative, Hepatitis negative.   Genotype with resistance to stavudine and zidovudine, otherwise negative   Presented with + thrush, including possible esophageal thrush, overall malaise, URI with SOB. + hx of recurrent pneumothorax and blebs with a RML lung nodule <1cm. Last CT was in 2018. He did not have the one ordered in 2021 yet.   He has a hx of VATS as well. There is also a positive rheumatoid factor from 2014 - barely positive and subsequent tests negative since then  + hx of reynauds    + cigarette use, no illicit drugs  No alcohol.   Persistent back pain from neck to lumbar region, has seen pain management.   Likes to travel - has been to [facility] this past February and [place] in the past, [place] as a teenager. No TB exposure that is known  + dogs at home.   Has been in custodial in the past     Feels ok.  Following up for MRSA infection of the left hand.  Was on Bactrim then doxycycline x 1 month.  Had seen a surgeon during this time, Dr. Reyes.  Healing well    Also following up for HIV.  Continues on Triumeq.  Still

## 2024-01-17 ENCOUNTER — TELEPHONE (OUTPATIENT)
Dept: INFECTIOUS DISEASES | Age: 39
End: 2024-01-17

## 2024-01-17 NOTE — TELEPHONE ENCOUNTER
Called in with wound worries.   Per pt might have hit it on table but the redness had increased as of yesterday. Today slightly better..     Reviewed with Dr Castro.   Continue to monitor. Call if worsens.     Pt updated. Will call with concerns

## 2024-01-23 ENCOUNTER — TELEPHONE (OUTPATIENT)
Dept: INFECTIOUS DISEASES | Age: 39
End: 2024-01-23

## 2024-01-23 NOTE — TELEPHONE ENCOUNTER
Pt informed CM he received updated information from [facility].  Pt will provide information via email. CM will submit to [facility] for update.  CM will follow up with pt as needed.

## 2024-02-09 DIAGNOSIS — B20 AIDS (ACQUIRED IMMUNODEFICIENCY SYNDROME), CD4 <=200 (HCC): Primary | Chronic | ICD-10-CM

## 2024-02-09 DIAGNOSIS — E78.00 HYPERCHOLESTEREMIA: ICD-10-CM

## 2024-02-09 DIAGNOSIS — E55.9 VITAMIN D DEFICIENCY: ICD-10-CM

## 2024-02-09 RX ORDER — ABACAVIR SULFATE, DOLUTEGRAVIR SODIUM, LAMIVUDINE 600; 50; 300 MG/1; MG/1; MG/1
1 TABLET, FILM COATED ORAL DAILY
Qty: 30 TABLET | Refills: 3 | Status: SHIPPED | OUTPATIENT
Start: 2024-02-09

## 2024-02-12 ENCOUNTER — TELEPHONE (OUTPATIENT)
Dept: INFECTIOUS DISEASES | Age: 39
End: 2024-02-12

## 2024-02-12 NOTE — TELEPHONE ENCOUNTER
Pt reached out stating he can not get his med filled/shipped. Unknown reason    Spoke with Bri with [facility]  Insurance confusion. Numbers where updated. He was able to process claim and ODAP covered co py.     Pt will call later today to verify and set up shipping. Denies issues and will call if needed

## 2024-02-13 ENCOUNTER — APPOINTMENT (OUTPATIENT)
Dept: NEUROLOGY | Facility: CLINIC | Age: 39
End: 2024-02-13
Payer: COMMERCIAL

## 2024-02-15 ENCOUNTER — TELEPHONE (OUTPATIENT)
Dept: INFECTIOUS DISEASES | Age: 39
End: 2024-02-15

## 2024-02-15 NOTE — TELEPHONE ENCOUNTER
Medication follow up.  Message left for pt to call office with update regarding obtaining medication

## 2024-02-16 NOTE — TELEPHONE ENCOUNTER
Pt noted he was still having issue obtaning medication. States per nancy. This RN requested he call [facility] and update profile. This RN updated it already. CM verified ODAP   Pt will call to review and set up shipment. He is to call right away should further issues arise.

## 2024-02-21 ENCOUNTER — TELEPHONE (OUTPATIENT)
Dept: NEUROLOGY | Facility: CLINIC | Age: 39
End: 2024-02-21
Payer: COMMERCIAL

## 2024-02-21 DIAGNOSIS — F98.8 ADD (ATTENTION DEFICIT DISORDER) WITHOUT HYPERACTIVITY: ICD-10-CM

## 2024-02-21 RX ORDER — LISDEXAMFETAMINE DIMESYLATE 70 MG/1
70 CAPSULE ORAL
Qty: 30 CAPSULE | Refills: 0 | Status: SHIPPED | OUTPATIENT
Start: 2024-02-21 | End: 2024-03-22

## 2024-02-21 NOTE — TELEPHONE ENCOUNTER
Pt stated he is still having issues with [facility] and obtaining ART        [facility]   Spoke with Jamecia- order clarified. States pt has not filled there for a while.   We were able to set up delivery for tomorrow apt is out of medications.     Pt updated and denied questions

## 2024-02-22 ENCOUNTER — APPOINTMENT (OUTPATIENT)
Dept: NEUROLOGY | Facility: CLINIC | Age: 39
End: 2024-02-22
Payer: COMMERCIAL

## 2024-02-26 ENCOUNTER — TELEPHONE (OUTPATIENT)
Dept: NEUROLOGY | Facility: CLINIC | Age: 39
End: 2024-02-26
Payer: COMMERCIAL

## 2024-02-26 ENCOUNTER — TELEPHONE (OUTPATIENT)
Dept: INFECTIOUS DISEASES | Age: 39
End: 2024-02-26

## 2024-02-26 NOTE — TELEPHONE ENCOUNTER
Call placed to pt as appt reminder. He  also needs lab work completed.  Message left as reminder regading apt/labs and to call office if apt does not work for his schedule.

## 2024-02-26 NOTE — TELEPHONE ENCOUNTER
Olya from [facility] left a vm stating they filled a partial script for the lisdexamfetamine 70mg.  They were able to fill 10 caps and the pharmacy advised the pt that he would be forfeiting the remainder of the script.

## 2024-02-27 ENCOUNTER — OFFICE VISIT (OUTPATIENT)
Dept: NEUROLOGY | Facility: CLINIC | Age: 39
End: 2024-02-27
Payer: COMMERCIAL

## 2024-02-27 VITALS
SYSTOLIC BLOOD PRESSURE: 128 MMHG | WEIGHT: 176.4 LBS | DIASTOLIC BLOOD PRESSURE: 90 MMHG | HEIGHT: 73 IN | BODY MASS INDEX: 23.38 KG/M2 | HEART RATE: 94 BPM

## 2024-02-27 DIAGNOSIS — F90.2 ADHD (ATTENTION DEFICIT HYPERACTIVITY DISORDER), COMBINED TYPE: ICD-10-CM

## 2024-02-27 DIAGNOSIS — F41.9 ANXIETY: ICD-10-CM

## 2024-02-27 DIAGNOSIS — F19.10 SUBSTANCE ABUSE (MULTI): ICD-10-CM

## 2024-02-27 DIAGNOSIS — G93.40 ENCEPHALOPATHY: Primary | ICD-10-CM

## 2024-02-27 DIAGNOSIS — G43.009 MIGRAINE WITHOUT AURA AND WITHOUT STATUS MIGRAINOSUS, NOT INTRACTABLE: ICD-10-CM

## 2024-02-27 PROCEDURE — 99214 OFFICE O/P EST MOD 30 MIN: CPT | Performed by: PSYCHIATRY & NEUROLOGY

## 2024-02-27 RX ORDER — SERDEXMETHYLPHENIDATE AND DEXMETHYLPHENIDATE 7.8; 39.2 MG/1; MG/1
1 CAPSULE ORAL DAILY
Qty: 30 CAPSULE | Refills: 0 | Status: SHIPPED | OUTPATIENT
Start: 2024-02-27 | End: 2024-03-28

## 2024-02-27 RX ORDER — SERDEXMETHYLPHENIDATE AND DEXMETHYLPHENIDATE 7.8; 39.2 MG/1; MG/1
1 CAPSULE ORAL DAILY
Qty: 30 CAPSULE | Refills: 0 | Status: SHIPPED | OUTPATIENT
Start: 2024-03-28 | End: 2024-04-27

## 2024-02-27 RX ORDER — SERDEXMETHYLPHENIDATE AND DEXMETHYLPHENIDATE 7.8; 39.2 MG/1; MG/1
1 CAPSULE ORAL DAILY
Qty: 30 CAPSULE | Refills: 0 | Status: SHIPPED | OUTPATIENT
Start: 2024-04-27 | End: 2024-05-27

## 2024-02-27 RX ORDER — SUMATRIPTAN SUCCINATE 100 MG/1
100 TABLET ORAL ONCE AS NEEDED
Qty: 9 TABLET | Refills: 3 | Status: SHIPPED | OUTPATIENT
Start: 2024-02-27

## 2024-02-27 ASSESSMENT — ENCOUNTER SYMPTOMS
SPEECH DIFFICULTY: 0
LIGHT-HEADEDNESS: 0
BRUISES/BLEEDS EASILY: 0
PHOTOPHOBIA: 0
ABDOMINAL PAIN: 0
SHORTNESS OF BREATH: 0
SEIZURES: 0
FATIGUE: 0
NUMBNESS: 0
NECK PAIN: 0
HYPERACTIVE: 0
TROUBLE SWALLOWING: 0
DIZZINESS: 0
FACIAL ASYMMETRY: 0
FREQUENCY: 0
ADENOPATHY: 0
SINUS PRESSURE: 0
WEAKNESS: 0
BACK PAIN: 0
SLEEP DISTURBANCE: 0
HALLUCINATIONS: 0
FEVER: 0
VOMITING: 0
NAUSEA: 0
JOINT SWELLING: 0
HEADACHES: 0
DIFFICULTY URINATING: 0
WHEEZING: 0
ARTHRALGIAS: 0
PALPITATIONS: 0
TREMORS: 0
COUGH: 0
AGITATION: 0
UNEXPECTED WEIGHT CHANGE: 0
NECK STIFFNESS: 0
EYE PAIN: 0
CONFUSION: 0

## 2024-02-27 ASSESSMENT — PATIENT HEALTH QUESTIONNAIRE - PHQ9
2. FEELING DOWN, DEPRESSED OR HOPELESS: NOT AT ALL
1. LITTLE INTEREST OR PLEASURE IN DOING THINGS: NOT AT ALL
SUM OF ALL RESPONSES TO PHQ9 QUESTIONS 1 & 2: 0

## 2024-02-27 NOTE — PROGRESS NOTES
Mathieu Kelly  38 y.o.       SUBJECTIVE    ADHD  Pertinent negatives include no abdominal pain, arthralgias, chest pain, coughing, fatigue, fever, headaches, joint swelling, nausea, neck pain, numbness, rash, vomiting or weakness.      Mathieu Kelly is a 38-year-old young man who was seen today for follow-up of his encephalopathy due to attention deficit disorder with difficulty at work and home.  Since last seen he is having problem getting his Vyvanse.  He was started on methylphenidate Adderall and Focalin in the past without much success and the Focalin would not last long enough.  Today his physical and neurological examination was normal.  I would like to change his Vyvanse to Azstarys 39.2 mg and if he still having any issue may increase the dose to 52.3 mg at a later date I have discussed the controlled substance policy, abusive potential, risk benefit and the precautions to be taken and depending on how he does I might make future recommendation when he comes back to see me in 3 months.    I did review the medication list.  Due to technical limitations of voice recognition and human error, this note may not accurately reflect the care of the patient.    Review of Systems   Constitutional:  Negative for fatigue, fever and unexpected weight change.   HENT:  Negative for dental problem, ear pain, hearing loss, sinus pressure, tinnitus and trouble swallowing.    Eyes:  Negative for photophobia, pain and visual disturbance.   Respiratory:  Negative for cough, shortness of breath and wheezing.    Cardiovascular:  Negative for chest pain, palpitations and leg swelling.   Gastrointestinal:  Negative for abdominal pain, nausea and vomiting.   Genitourinary:  Negative for difficulty urinating, enuresis and frequency.   Musculoskeletal:  Negative for arthralgias, back pain, joint swelling, neck pain and neck stiffness.   Skin:  Negative for pallor and rash.   Allergic/Immunologic: Negative for food  "allergies.   Neurological:  Negative for dizziness, tremors, seizures, syncope, facial asymmetry, speech difficulty, weakness, light-headedness, numbness and headaches.   Hematological:  Negative for adenopathy. Does not bruise/bleed easily.   Psychiatric/Behavioral:  Negative for agitation, behavioral problems, confusion, hallucinations and sleep disturbance. The patient is not hyperactive.         Patient Active Problem List   Diagnosis    ADHD (attention deficit hyperactivity disorder), combined type    Anemia    Anxiety    Asthma    COPD (chronic obstructive pulmonary disease) (CMS/[facility])    Dysphagia    Encephalopathy    HIV-1 (human immunodeficiency virus I) (CMS/[facility])    Ischemic heart disease    Laceration of finger    Lung nodule    Vertigo    Vitamin D deficiency    Failure of outpatient treatment    Abscess of left hand     Past Medical History:   Diagnosis Date    ADHD (attention deficit hyperactivity disorder)     Anxiety     Asthma     HIV (human immunodeficiency virus infection) (CMS/[facility])      Past Surgical History:   Procedure Laterality Date    LUNG SURGERY  05/18/2017    Lung Surgery    OTHER SURGICAL HISTORY  05/18/2017    Oral Surgery Tooth Extraction [facility]       reports that he has been smoking cigarettes. He has been smoking an average of .25 packs per day. He has never used smokeless tobacco. Alcohol use questions deferred to the physician. Drug use questions deferred to the physician.    /90 (BP Location: Left arm, Patient Position: Sitting)   Pulse 94   Ht 1.854 m (6' 1\")   Wt 80 kg (176 lb 6.4 oz)   BMI 23.27 kg/m²     OBJECTIVE  Physical Exam/Neurological Exam   Constitutional: General appearance: no acute distress   Auscultation of Heart: Regular rate and rhythm, no murmurs, normal S1 and S2.   Carotid Arteries: Intact without any bruits.   Neck is supple.   No lymph adenopathy.   Peripheral Vascular Exam: Pulses +2 and equal in all extremities. No swelling, varicosities, " edema or tenderness to palpations.    Abdomen is soft, nondistended. No organomegaly.  Mental status: The patient was in no distress, alert, interactive and cooperative. Affect is appropriate.   Orientation: oriented to person, oriented to place and oriented to time.   Memory: recent memory intact and remote memory intact.   Attention: normal attention span and normal concentrating ability.   Language: normal comprehension and no difficulty naming common objects.   Fund of knowledge: Patient displays adequate knowledge of current events, adequate fund of knowledge regarding past history and adequate fund of knowledge regarding vocabulary.   Eyes: The ophthalmoscopic examination was normal. The fundi are visualized with normal disc margins and without.  Cranial nerve II: Visual fields full to confrontation.   Cranial nerves III, IV, and VI: Pupils round, equally reactive to light; no ptosis. EOMs intact. No nystagmus.   Cranial Nerve V: Facial sensation intact bilaterally.   Cranial nerve VII: Normal and symmetric facial strength.   Cranial nerve VIII: Hearing is intact bilaterally to finger rub / whisper.   Cranial nerves IX and X: Palate elevates symmetrically.   Cranial nerve XI: Shoulder shrug and neck rotation strength are intact.   Cranial nerve XII: Tongue midline with normal strength.   Motor: Motor exam was normal. Muscle bulk was normal in both upper and lower extremities. Muscle tone was normal in both upper and lower extremities. Muscle strength was 5/5 throughout. no abnormal or adventitious movements were present.   Deep Tendon Reflexes: left biceps 2+ , right biceps 2+, left triceps 2+, right triceps 2+, left brachioradialis 2+, right brachioradialis 2+, left patella 2+, right patella 2+, left ankle jerk 2+, right ankle jerk 2+   Plantar Reflex: Toes downgoing to plantar stimulation on the left. Toes downgoing to plantar stimulation on the right.   Sensory Exam: Normal to light touch.   Coordination:  There is no limb dystaxia and rapid alternating movements are intact.  Gait: Gait is normal without spasticity, ataxia or bradykinesia. Stance is stable with a negative Romberg.      ASSESSMENT/PLAN  Diagnoses and all orders for this visit:  Encephalopathy  ADHD (attention deficit hyperactivity disorder), combined type  -     Drug Screen, Urine With Reflex to Confirmation; Future  -     Azstarys 39.2 mg- 7.8 mg capsule; Take 1 capsule by mouth once daily.  -     Azstarys 39.2 mg- 7.8 mg capsule; Take 1 capsule by mouth once daily. Do not start before March 28, 2024.  -     Azstarys 39.2 mg- 7.8 mg capsule; Take 1 capsule by mouth once daily. Do not start before April 27, 2024.  Migraine without aura and without status migrainosus, not intractable  -     SUMAtriptan (Imitrex) 100 mg tablet; Take 1 tablet (100 mg) by mouth 1 time if needed for migraine.  Anxiety        Ron Ferraro MD  2/27/2024  10:12 AM

## 2024-02-29 LAB
AMPHETAMINES UR QL SCN: ABNORMAL
BARBITURATES UR QL SCN: ABNORMAL
BENZODIAZ UR QL SCN: ABNORMAL
BZE UR QL SCN: ABNORMAL
CANNABINOIDS UR QL SCN: ABNORMAL
FENTANYL+NORFENTANYL UR QL SCN: ABNORMAL
OPIATES UR QL SCN: ABNORMAL
OXYCODONE+OXYMORPHONE UR QL SCN: ABNORMAL
PCP UR QL SCN: ABNORMAL

## 2024-02-29 PROCEDURE — 80349 CANNABINOIDS NATURAL: CPT

## 2024-02-29 PROCEDURE — 80354 DRUG SCREENING FENTANYL: CPT

## 2024-02-29 PROCEDURE — 80307 DRUG TEST PRSMV CHEM ANLYZR: CPT

## 2024-02-29 PROCEDURE — 80324 DRUG SCREEN AMPHETAMINES 1/2: CPT

## 2024-03-03 LAB
AMPHET UR-MCNC: 4663 NG/ML
MDA UR-MCNC: <200 NG/ML
MDEA UR-MCNC: <200 NG/ML
MDMA UR-MCNC: <200 NG/ML
METHAMPHET UR-MCNC: <200 NG/ML
PHENTERMINE UR CFM-MCNC: <200 NG/ML

## 2024-03-04 LAB — CARBOXYTHC UR-MCNC: 315 NG/ML

## 2024-03-07 LAB
FENTANYL UR CFM-MCNC: 56.2 NG/ML
NORFENTANYL UR CFM-MCNC: <2.5 NG/ML

## 2024-04-09 VITALS
BODY MASS INDEX: 23.1 KG/M2 | DIASTOLIC BLOOD PRESSURE: 100 MMHG | WEIGHT: 180 LBS | HEART RATE: 98 BPM | HEIGHT: 74 IN | RESPIRATION RATE: 17 BRPM | OXYGEN SATURATION: 97 % | TEMPERATURE: 98.8 F | SYSTOLIC BLOOD PRESSURE: 144 MMHG

## 2024-04-09 PROCEDURE — 99283 EMERGENCY DEPT VISIT LOW MDM: CPT

## 2024-04-09 ASSESSMENT — COLUMBIA-SUICIDE SEVERITY RATING SCALE - C-SSRS
6. HAVE YOU EVER DONE ANYTHING, STARTED TO DO ANYTHING, OR PREPARED TO DO ANYTHING TO END YOUR LIFE?: NO
1. IN THE PAST MONTH, HAVE YOU WISHED YOU WERE DEAD OR WISHED YOU COULD GO TO SLEEP AND NOT WAKE UP?: NO
2. HAVE YOU ACTUALLY HAD ANY THOUGHTS OF KILLING YOURSELF?: NO

## 2024-04-09 ASSESSMENT — PAIN - FUNCTIONAL ASSESSMENT: PAIN_FUNCTIONAL_ASSESSMENT: 0-10

## 2024-04-09 ASSESSMENT — PAIN DESCRIPTION - ORIENTATION: ORIENTATION: LOWER

## 2024-04-09 ASSESSMENT — PAIN DESCRIPTION - LOCATION: LOCATION: TEETH

## 2024-04-09 ASSESSMENT — PAIN SCALES - GENERAL: PAINLEVEL_OUTOF10: 5 - MODERATE PAIN

## 2024-04-09 ASSESSMENT — PAIN DESCRIPTION - PAIN TYPE: TYPE: ACUTE PAIN

## 2024-04-10 ENCOUNTER — HOSPITAL ENCOUNTER (EMERGENCY)
Facility: HOSPITAL | Age: 39
Discharge: HOME | End: 2024-04-10
Attending: EMERGENCY MEDICINE
Payer: COMMERCIAL

## 2024-04-10 ENCOUNTER — TELEPHONE (OUTPATIENT)
Dept: INFECTIOUS DISEASES | Age: 39
End: 2024-04-10

## 2024-04-10 DIAGNOSIS — K08.89 PAIN, DENTAL: Primary | ICD-10-CM

## 2024-04-10 RX ORDER — OXYCODONE AND ACETAMINOPHEN 5; 325 MG/1; MG/1
1 TABLET ORAL EVERY 6 HOURS PRN
Qty: 5 TABLET | Refills: 0 | Status: SHIPPED | OUTPATIENT
Start: 2024-04-10 | End: 2024-04-13

## 2024-04-10 RX ORDER — CLINDAMYCIN HYDROCHLORIDE 150 MG/1
150 CAPSULE ORAL EVERY 6 HOURS
Qty: 28 CAPSULE | Refills: 0 | Status: SHIPPED | OUTPATIENT
Start: 2024-04-10 | End: 2024-04-17

## 2024-04-10 NOTE — ED PROVIDER NOTES
HPI   Chief Complaint   Patient presents with    Dental Pain     I had MRSA in December, and I saw [facility] in [place] for maybe a dental abscess and they told me to go to the ER       Chief complaint tooth pain  History of present illness this is a 38-year-old here with left lower molar tooth pain.  No nausea or vomiting.  No fever or chills.  And was seen in urgent care [place] and told to come here because he has had MRSA of the skin.  He arrives here there is no systemic symptoms is nontoxic-appearing here with a blood pressure 1 4400 temp 37 pulse 90 respirate 17 pulse ox 97% has a history of HIV is followed closely by infectious disease       ok                    Rivka Coma Scale Score: 15                     Patient History   Past Medical History:   Diagnosis Date    ADHD (attention deficit hyperactivity disorder)     Anxiety     Asthma     HIV (human immunodeficiency virus infection) (CMS/[facility])      Past Surgical History:   Procedure Laterality Date    LUNG SURGERY  05/18/2017    Lung Surgery    OTHER SURGICAL HISTORY  05/18/2017    Oral Surgery Tooth Extraction [facility]     Family History   Problem Relation Name Age of Onset    Hypertension Mother      Hypertension Father       Social History     Tobacco Use    Smoking status: Every Day     Current packs/day: 0.25     Types: Cigarettes    Smokeless tobacco: Never   Vaping Use    Vaping status: Some Days   Substance Use Topics    Alcohol use: Defer    Drug use: Never       Physical Exam   ED Triage Vitals [04/09/24 2342]   Temperature Heart Rate Respirations BP   37.1 °C (98.8 °F) 98 17 (!) 144/100      Pulse Ox Temp Source Heart Rate Source Patient Position   97 % Temporal Monitor Sitting      BP Location FiO2 (%)     Right arm --       Physical Exam  Vitals and nursing note reviewed. Exam conducted with a chaperone present.   Constitutional:       General: He is not in acute distress.     Appearance: Normal appearance. He is  not ill-appearing, toxic-appearing or diaphoretic.   HENT:      Head: Normocephalic and atraumatic.      Mouth/Throat:      Comments: Examination of the mouth reveals periodontal disease no abscess formation.  The left lateral incisor lower  Cardiovascular:      Rate and Rhythm: Normal rate and regular rhythm.   Pulmonary:      Effort: Pulmonary effort is normal.      Breath sounds: Normal breath sounds.   Abdominal:      General: Abdomen is flat.   Musculoskeletal:         General: Normal range of motion.      Cervical back: Normal range of motion and neck supple.   Skin:     Capillary Refill: Capillary refill takes less than 2 seconds.   Neurological:      Mental Status: He is alert.         ED Course & MDM   Diagnoses as of 04/10/24 0244   Pain, dental       Medical Decision Making  Differential diagnosis for this patient   #1 dental fracture  #2 periodontal disease  #3 dental carry  #4 dental abscess  Considering the above differential diagnosis following test and treatments were considered in order was given a Percocet prescription and clindamycin        Procedure  Procedures     Tommy Chaparro MD  04/10/24 0748

## 2024-04-10 NOTE — TELEPHONE ENCOUNTER
Pt called in with health concerns.    [facility] issues. Pain left lower.   Told to go to  ER  States he went and was given antibx, clinda.     Pt has concerns for worsening infection. No dental coverage. [facility]. Last seen 1/2023    [facility] with [facility] Dayna   Pt currently infected. Needs to complete antibx and be seen with in 1-2 months per Linden Soriano DDS  We were able to scheduled 5.22.24 at 11;20     [facility]   5.22.24 at 1140  [address] [facility] [address] with Linden Soriano DDS  Phone [phone] if r/s is needed        Call placed to pt.   Discussed need to complete antibx.   Follow up scheduled.   Pt is to call office in the next 24-48 hours with update regarding dental concern. If worsens pt to go to ER.

## 2024-04-11 ENCOUNTER — TELEPHONE (OUTPATIENT)
Dept: INFECTIOUS DISEASES | Age: 39
End: 2024-04-11

## 2024-04-11 NOTE — TELEPHONE ENCOUNTER
Pt called in with update  Has been taking clinda   No further pain  Will call with other issues or worsening pain.

## 2024-04-19 ENCOUNTER — TELEPHONE (OUTPATIENT)
Dept: INFECTIOUS DISEASES | Age: 39
End: 2024-04-19

## 2024-04-19 NOTE — TELEPHONE ENCOUNTER
Call placed to pt as appt reminder. He/  also needs lab work completed.     Recent dental issue follow up as well.   V/V are available.     VM left as reminder regading apt/labs and to call office if apt does not work for his schedule.

## 2024-04-22 ENCOUNTER — TELEPHONE (OUTPATIENT)
Dept: INFECTIOUS DISEASES | Age: 39
End: 2024-04-22

## 2024-05-06 ENCOUNTER — TELEPHONE (OUTPATIENT)
Dept: INFECTIOUS DISEASES | Age: 39
End: 2024-05-06

## 2024-05-06 NOTE — TELEPHONE ENCOUNTER
Call placed to pt regarding need to update RW eligibility and paperwork.     Semi annual/ISP and ADAP      Message left to schedule needed apt.

## 2024-05-10 NOTE — TELEPHONE ENCOUNTER
Call placed to pt as appt reminder. He  also needs lab work completed.     Noted he is also due to [facility] updates/ODAP    VM left as reminder regading apt/labs and to call office if apt does not work for his schedule.

## 2024-05-21 ENCOUNTER — APPOINTMENT (OUTPATIENT)
Dept: NEUROLOGY | Facility: CLINIC | Age: 39
End: 2024-05-21
Payer: COMMERCIAL

## 2024-06-05 NOTE — TELEPHONE ENCOUNTER
Pt did apply to medicaid last week. Discussed that copy of application is needed for ODAP.     Needs ODAP formulary  Working? -- still not working/ working on un employment.     CM apt scheduled 6.13.24 at 11  States he has enough meds right now.

## 2024-06-07 ENCOUNTER — TELEPHONE (OUTPATIENT)
Dept: INFECTIOUS DISEASES | Age: 39
End: 2024-06-07

## 2024-06-07 NOTE — TELEPHONE ENCOUNTER
Spoke with pt.   Update:  Pt has apt with JAMAR 6.10.24 via phone.   Apply/follow up  Discussed importance of providing proof of application with name and medical on it for [facility] assistance. He verbalized understanding.

## 2024-06-17 ENCOUNTER — TELEPHONE (OUTPATIENT)
Dept: INFECTIOUS DISEASES | Age: 39
End: 2024-06-17

## 2024-07-24 ENCOUNTER — TELEPHONE (OUTPATIENT)
Dept: INFECTIOUS DISEASES | Age: 39
End: 2024-07-24

## 2024-07-26 NOTE — TELEPHONE ENCOUNTER
Pt returned call.   Out of town. Helping with grandmother. Starting [employer] training n2xt week. Then will get a job.   No insurance. Did not follow up with Medicaid. States once hired he will get benefits.   Discussed medication coverage.   Reeducated pt on RW program. Some confusion regarding insurance and meds/ODAP  CM apt scheduled to help at least with medications.   Pt does need labs and follow up apt here. Pt aware of importance.   Has 2 bottles of Triumeq still. States this is from when/is non compliant with ART.   Providied active listening as pt discussed health concerns of his mother.     Will call back if issues arise. Pt is aware of ODAP and we reviewed all needed documentation needed for apt. He verbalized understanding.

## 2024-08-06 ENCOUNTER — TELEPHONE (OUTPATIENT)
Dept: INFECTIOUS DISEASES | Age: 39
End: 2024-08-06

## 2024-08-07 ENCOUNTER — NURSE ONLY (OUTPATIENT)
Dept: INFECTIOUS DISEASES | Age: 39
End: 2024-08-07

## 2024-08-07 NOTE — PROGRESS NOTES
Pt presented for scheduled appointment with CM.  It has been determined that pt is now out of TGA and not eligible for services under  Part A.  Pt is [facility].  Although he is out of TGA, CM will be able to assist with [facility] Application for meds, as pt currently has no insurance.  Pt will be eligible for insurance under Employment in November 2024.  Pt will need to provide proof of Medicaid Application [facility].  Once documentation is provided, CM will be able to complete OHDAP Application.  Per pt, he has sufficient meds for up to 2 months.  CM will follow up with pt as needed.

## 2024-11-05 ENCOUNTER — TELEPHONE (OUTPATIENT)
Dept: INFECTIOUS DISEASES | Age: 39
End: 2024-11-05

## 2024-11-05 NOTE — TELEPHONE ENCOUNTER
Pt had moved. Call placed as follow up.   Unsure if apt is needed or if pt established anywhere else.   Message left.

## 2024-12-17 NOTE — TELEPHONE ENCOUNTER
Received message from pt noting he has moved back to this area.   Attempted to reach pt for scheduling. VM left asking for pt to call office to discuss.

## 2025-03-03 ENCOUNTER — TELEPHONE (OUTPATIENT)
Dept: INFECTIOUS DISEASES | Age: 40
End: 2025-03-03

## 2025-03-03 DIAGNOSIS — B20 HUMAN IMMUNODEFICIENCY VIRUS (HCC): Primary | ICD-10-CM

## 2025-03-03 NOTE — TELEPHONE ENCOUNTER
Pt called in wanting to reengage in care here. Now living back in [place] with mother.  No longer with partner  Got into trouble. Drug use.   Has not been taking Triumeq  Wants to get back on track.  Apt scheduled and he will go for lab work tomorrow.

## 2025-05-06 ENCOUNTER — TELEPHONE (OUTPATIENT)
Age: 40
End: 2025-05-06

## 2025-05-06 NOTE — TELEPHONE ENCOUNTER
Message from pt received stating he went to ER Friday and has broken foot.       Attempted to call to discuss further. VM left for call back.   Pt also wanted to return to care but has not established yet.

## 2025-07-18 ENCOUNTER — TELEPHONE (OUTPATIENT)
Age: 40
End: 2025-07-18

## 2025-07-18 NOTE — TELEPHONE ENCOUNTER
Out of care.   Call placed to [facility].   Was out of state - in [place] now?  VM left for call back for update and discuss follow up

## (undated) DEVICE — SUTURE, ETHILON, 4-0, BLK, MONO, PS-2 18

## (undated) DEVICE — STRAP, VELCRO, BODY, 4 X 60IN, NS

## (undated) DEVICE — TOWEL PACK 10-PK

## (undated) DEVICE — TUBE SET 96 MM 64 MM H2O PERISTALTIC STD AUX CHANNEL

## (undated) DEVICE — ENDO CARRY-ON PROCEDURE KIT: Brand: ENDO CARRY-ON PROCEDURE KIT

## (undated) DEVICE — IRRIGATION SET, CYSTOSCOPY, REGULATING CLAMP, STRAIGHT, 81 IN

## (undated) DEVICE — DEFENDO AIR WATER SUCTION AND BIOPSY VALVE KIT FOR  OLYMPUS: Brand: DEFENDO AIR/WATER/SUCTION AND BIOPSY VALVE

## (undated) DEVICE — Device

## (undated) DEVICE — GOWN, SURGICAL, ROYAL SILK, XL, STERILE

## (undated) DEVICE — GOWN, SURGICAL, ROYAL SILK, LG, STERILE

## (undated) DEVICE — PADDING, CAST, SPECIALIST, 4 IN X 4 YD, STERILE

## (undated) DEVICE — DRESSING, GAUZE, PETROLATUM, STRIP, XEROFORM, 1 X 8 IN, STERILE

## (undated) DEVICE — Device: Brand: ENDO SMARTCAP

## (undated) DEVICE — MANIFOLD, 4 PORT NEPTUNE STANDARD

## (undated) DEVICE — BOWL, BASIN, 32 OZ, STERILE

## (undated) DEVICE — SYRINGE, 60 CC, IRRIGATION, BULB, CONTRO-BULB, PAPER POUCH

## (undated) DEVICE — SONY PRINTER PAPER

## (undated) DEVICE — SUTURE, VICRYL, 4-0, 18 IN, UNDYED BR PS-2

## (undated) DEVICE — DRESSING, GAUZE, SPONGE, 12 PLY, 4 X 4 IN, PLASTIC POUCH, STRL 10PK

## (undated) DEVICE — PACKING, IODOFORM, CURITY, 0.25 IN X 5 YD, STERILE

## (undated) DEVICE — STRAP, ARM BOARD, 32 X 1.5

## (undated) DEVICE — GLOVE SURG SZ 85 STD WHT LTX SYN POLYMER BEAD REINF ANTI RL

## (undated) DEVICE — SUTURE, ETHILON, 3-0, 18 IN, PS1, BLACK

## (undated) DEVICE — CONMED SCOPE SAVER BITE BLOCK, 20X27 MM: Brand: SCOPE SAVER

## (undated) DEVICE — GLOVE, SURGICAL, PROTEXIS PI BLUE W/NEUTHERA, 6.5, PF, LF

## (undated) DEVICE — ADAPTER FLSH PMP FLD MGMT GI IRRIG OFP 2 DISPOSABLE

## (undated) DEVICE — COLLECTION/DELIVERY SYSTEM, COPAN ESWAB, REG SIZE SWAB

## (undated) DEVICE — BANDAGE, REB, 4 X 5YDS

## (undated) DEVICE — FORCEPS BX L240CM JAW DIA2.8MM L CAP W/ NDL MIC MESH TOOTH

## (undated) DEVICE — GLOVE, SURGICAL, PROTEXIS PI , 6.5, PF, LF